# Patient Record
Sex: MALE | Race: WHITE | NOT HISPANIC OR LATINO | Employment: OTHER | URBAN - METROPOLITAN AREA
[De-identification: names, ages, dates, MRNs, and addresses within clinical notes are randomized per-mention and may not be internally consistent; named-entity substitution may affect disease eponyms.]

---

## 2019-03-18 ENCOUNTER — APPOINTMENT (OUTPATIENT)
Dept: LAB | Age: 84
End: 2019-03-18
Payer: MEDICARE

## 2019-03-18 ENCOUNTER — TRANSCRIBE ORDERS (OUTPATIENT)
Dept: ADMINISTRATIVE | Age: 84
End: 2019-03-18

## 2019-03-18 DIAGNOSIS — K92.1 BLOOD IN STOOL: ICD-10-CM

## 2019-03-18 DIAGNOSIS — K92.1 BLOOD IN STOOL: Primary | ICD-10-CM

## 2019-03-18 LAB
ERYTHROCYTE [DISTWIDTH] IN BLOOD BY AUTOMATED COUNT: 13.2 % (ref 11.6–15.1)
HCT VFR BLD AUTO: 41.6 % (ref 36.5–49.3)
HGB BLD-MCNC: 13.9 G/DL (ref 12–17)
MCH RBC QN AUTO: 30 PG (ref 26.8–34.3)
MCHC RBC AUTO-ENTMCNC: 33.4 G/DL (ref 31.4–37.4)
MCV RBC AUTO: 90 FL (ref 82–98)
PLATELET # BLD AUTO: 215 THOUSANDS/UL (ref 149–390)
PMV BLD AUTO: 12 FL (ref 8.9–12.7)
RBC # BLD AUTO: 4.64 MILLION/UL (ref 3.88–5.62)
WBC # BLD AUTO: 9.1 THOUSAND/UL (ref 4.31–10.16)

## 2019-03-18 PROCEDURE — 85027 COMPLETE CBC AUTOMATED: CPT

## 2019-03-18 PROCEDURE — 36415 COLL VENOUS BLD VENIPUNCTURE: CPT

## 2019-03-20 PROBLEM — K62.5 RECTAL BLEEDING: Status: ACTIVE | Noted: 2019-03-20

## 2019-03-20 PROBLEM — Z12.11 ENCOUNTER FOR SCREENING FECAL OCCULT BLOOD TESTING: Status: ACTIVE | Noted: 2019-03-20

## 2019-03-28 PROCEDURE — 88305 TISSUE EXAM BY PATHOLOGIST: CPT | Performed by: PATHOLOGY

## 2019-03-29 ENCOUNTER — LAB REQUISITION (OUTPATIENT)
Dept: LAB | Facility: HOSPITAL | Age: 84
End: 2019-03-29
Payer: MEDICARE

## 2019-03-29 DIAGNOSIS — D12.3 BENIGN NEOPLASM OF TRANSVERSE COLON: ICD-10-CM

## 2019-03-29 DIAGNOSIS — K57.30 DIVERTICULOSIS OF LARGE INTESTINE WITHOUT PERFORATION OR ABSCESS WITHOUT BLEEDING: ICD-10-CM

## 2020-09-16 ENCOUNTER — CLINICAL SUPPORT (OUTPATIENT)
Dept: INTERNAL MEDICINE CLINIC | Facility: CLINIC | Age: 85
End: 2020-09-16
Payer: MEDICARE

## 2020-09-16 DIAGNOSIS — Z23 NEED FOR INFLUENZA VACCINATION: Primary | ICD-10-CM

## 2020-09-16 PROCEDURE — 90653 IIV ADJUVANT VACCINE IM: CPT

## 2020-09-16 PROCEDURE — G0008 ADMIN INFLUENZA VIRUS VAC: HCPCS

## 2020-10-05 DIAGNOSIS — I10 ESSENTIAL HYPERTENSION: Primary | ICD-10-CM

## 2020-10-05 RX ORDER — AMLODIPINE BESYLATE 10 MG/1
10 TABLET ORAL DAILY
Qty: 90 TABLET | Refills: 0 | Status: SHIPPED | OUTPATIENT
Start: 2020-10-05 | End: 2020-12-21

## 2020-10-08 ENCOUNTER — TELEPHONE (OUTPATIENT)
Dept: INTERNAL MEDICINE CLINIC | Facility: CLINIC | Age: 85
End: 2020-10-08

## 2020-10-08 DIAGNOSIS — I10 ESSENTIAL HYPERTENSION: ICD-10-CM

## 2020-10-08 DIAGNOSIS — E78.5 HYPERLIPIDEMIA, UNSPECIFIED HYPERLIPIDEMIA TYPE: Primary | ICD-10-CM

## 2020-10-14 ENCOUNTER — OFFICE VISIT (OUTPATIENT)
Dept: INTERNAL MEDICINE CLINIC | Facility: CLINIC | Age: 85
End: 2020-10-14
Payer: MEDICARE

## 2020-10-14 VITALS
DIASTOLIC BLOOD PRESSURE: 84 MMHG | BODY MASS INDEX: 27.14 KG/M2 | SYSTOLIC BLOOD PRESSURE: 157 MMHG | HEART RATE: 89 BPM | TEMPERATURE: 98 F | WEIGHT: 159 LBS | HEIGHT: 64 IN

## 2020-10-14 DIAGNOSIS — I10 ESSENTIAL HYPERTENSION: Primary | ICD-10-CM

## 2020-10-14 PROCEDURE — 99214 OFFICE O/P EST MOD 30 MIN: CPT | Performed by: INTERNAL MEDICINE

## 2020-12-21 DIAGNOSIS — I10 ESSENTIAL HYPERTENSION: ICD-10-CM

## 2020-12-21 RX ORDER — AMLODIPINE BESYLATE 10 MG/1
TABLET ORAL
Qty: 90 TABLET | Refills: 0 | Status: SHIPPED | OUTPATIENT
Start: 2020-12-21 | End: 2021-03-31 | Stop reason: SDUPTHER

## 2021-01-20 DIAGNOSIS — Z23 ENCOUNTER FOR IMMUNIZATION: ICD-10-CM

## 2021-01-21 ENCOUNTER — IMMUNIZATIONS (OUTPATIENT)
Dept: FAMILY MEDICINE CLINIC | Facility: HOSPITAL | Age: 86
End: 2021-01-21

## 2021-01-21 DIAGNOSIS — Z23 ENCOUNTER FOR IMMUNIZATION: Primary | ICD-10-CM

## 2021-01-21 PROCEDURE — 0011A SARS-COV-2 / COVID-19 MRNA VACCINE (MODERNA) 100 MCG: CPT

## 2021-01-21 PROCEDURE — 91301 SARS-COV-2 / COVID-19 MRNA VACCINE (MODERNA) 100 MCG: CPT

## 2021-02-18 ENCOUNTER — IMMUNIZATIONS (OUTPATIENT)
Dept: FAMILY MEDICINE CLINIC | Facility: HOSPITAL | Age: 86
End: 2021-02-18

## 2021-02-18 DIAGNOSIS — Z23 ENCOUNTER FOR IMMUNIZATION: Primary | ICD-10-CM

## 2021-02-18 PROCEDURE — 91301 SARS-COV-2 / COVID-19 MRNA VACCINE (MODERNA) 100 MCG: CPT

## 2021-02-18 PROCEDURE — 0012A SARS-COV-2 / COVID-19 MRNA VACCINE (MODERNA) 100 MCG: CPT

## 2021-03-01 ENCOUNTER — TELEPHONE (OUTPATIENT)
Dept: INTERNAL MEDICINE CLINIC | Facility: CLINIC | Age: 86
End: 2021-03-01

## 2021-03-01 DIAGNOSIS — E78.5 HYPERLIPIDEMIA, UNSPECIFIED HYPERLIPIDEMIA TYPE: Primary | ICD-10-CM

## 2021-03-01 DIAGNOSIS — I10 ESSENTIAL HYPERTENSION: ICD-10-CM

## 2021-03-01 NOTE — TELEPHONE ENCOUNTER
Patient has an appt with you on 4/5/2021 and wants to know if he should get blood work done before the appointment?

## 2021-03-01 NOTE — TELEPHONE ENCOUNTER
Called and left a detailed message for patient that blood work script is in computer if he is going to [facility] and if he is going to another location we can fax it over

## 2021-03-31 DIAGNOSIS — I10 ESSENTIAL HYPERTENSION: ICD-10-CM

## 2021-04-01 RX ORDER — AMLODIPINE BESYLATE 10 MG/1
10 TABLET ORAL DAILY
Qty: 90 TABLET | Refills: 0 | Status: SHIPPED | OUTPATIENT
Start: 2021-04-01 | End: 2021-06-30 | Stop reason: SDUPTHER

## 2021-04-05 ENCOUNTER — OFFICE VISIT (OUTPATIENT)
Dept: INTERNAL MEDICINE CLINIC | Facility: CLINIC | Age: 86
End: 2021-04-05
Payer: MEDICARE

## 2021-04-05 VITALS
BODY MASS INDEX: 27.66 KG/M2 | OXYGEN SATURATION: 97 % | HEIGHT: 64 IN | DIASTOLIC BLOOD PRESSURE: 80 MMHG | WEIGHT: 162 LBS | HEART RATE: 86 BPM | SYSTOLIC BLOOD PRESSURE: 135 MMHG | TEMPERATURE: 98.3 F

## 2021-04-05 DIAGNOSIS — K62.5 RECTAL BLEEDING: ICD-10-CM

## 2021-04-05 DIAGNOSIS — Z00.00 MEDICARE ANNUAL WELLNESS VISIT, SUBSEQUENT: Primary | ICD-10-CM

## 2021-04-05 DIAGNOSIS — I10 ESSENTIAL HYPERTENSION: ICD-10-CM

## 2021-04-05 DIAGNOSIS — N18.30 CKD (CHRONIC KIDNEY DISEASE) STAGE 3, GFR 30-59 ML/MIN (HCC): ICD-10-CM

## 2021-04-05 DIAGNOSIS — Z12.11 ENCOUNTER FOR SCREENING FECAL OCCULT BLOOD TESTING: ICD-10-CM

## 2021-04-05 PROCEDURE — G0438 PPPS, INITIAL VISIT: HCPCS | Performed by: INTERNAL MEDICINE

## 2021-04-05 PROCEDURE — 99214 OFFICE O/P EST MOD 30 MIN: CPT | Performed by: INTERNAL MEDICINE

## 2021-04-05 PROCEDURE — 1123F ACP DISCUSS/DSCN MKR DOCD: CPT | Performed by: INTERNAL MEDICINE

## 2021-04-05 NOTE — PROGRESS NOTES
Assessment/Plan:    No problem-specific Assessment & Plan notes found for this encounter  Diagnoses and all orders for this visit:    Medicare annual wellness visit, subsequent          Subjective:      Patient ID: Vitaliy Huffman is a 81 y o  male  Follow up  Hypertension      The following portions of the patient's history were reviewed and updated as appropriate: allergies, current medications, past family history, past medical history, past social history, past surgical history, and problem list     Review of Systems   Constitutional: Negative  HENT: Negative for dental problem, drooling, ear discharge and ear pain  Eyes: Negative for discharge, redness and itching  Respiratory: Negative for apnea, cough and wheezing  Cardiovascular: Negative for chest pain and palpitations  Gastrointestinal: Negative for abdominal pain, blood in stool, diarrhea and vomiting  Endocrine: Negative for polydipsia, polyphagia and polyuria  Genitourinary: Negative for decreased urine volume, dysuria and frequency  Musculoskeletal: Negative for arthralgias, myalgias and neck stiffness  Skin: Negative for pallor and wound  Allergic/Immunologic: Negative for environmental allergies and food allergies  Neurological: Negative for facial asymmetry, light-headedness, numbness and headaches  Hematological: Negative for adenopathy  Does not bruise/bleed easily  Psychiatric/Behavioral: Negative for agitation, behavioral problems and confusion  BMI Counseling: Body mass index is 27 81 kg/m²  The BMI is above normal  Nutrition recommendations include reducing portion sizes  Exercise recommendations include exercising 3-5 times per week  Pharmacotherapy was ordered for patient to aid in weight loss  2  Objective:      /80 (BP Location: Right arm, Patient Position: Sitting, Cuff Size: Standard)   Pulse 86   Temp 98 3 °F (36 8 °C) (Temporal)   Ht 5' 4" (1 626 m)   Wt 73 5 kg (162 lb)   SpO2 97%   BMI 27 81 kg/m²          Physical Exam  Constitutional:       Appearance: Normal appearance  HENT:      Head: Normocephalic  Nose: Nose normal       Mouth/Throat:      Mouth: Mucous membranes are moist    Eyes:      Pupils: Pupils are equal, round, and reactive to light  Neck:      Musculoskeletal: Neck supple  Cardiovascular:      Rate and Rhythm: Regular rhythm  Heart sounds: Normal heart sounds  Pulmonary:      Breath sounds: Normal breath sounds  Abdominal:      Palpations: Abdomen is soft  Musculoskeletal:         General: No swelling  Skin:     General: Skin is warm  Neurological:      General: No focal deficit present  Mental Status: He is alert and oriented to person, place, and time  Psychiatric:         Mood and Affect: Mood normal         Assessment and Plan:  Hypertension  Well controlled  With  Current meds  CKD  Stage  3  stabe  Problem List Items Addressed This Visit     None           Preventive health issues were discussed with patient, and age appropriate screening tests were ordered as noted in patient's After Visit Summary  Personalized health advice and appropriate referrals for health education or preventive services given if needed, as noted in patient's After Visit Summary       History of Present Illness:     Patient presents for Medicare Annual Wellness visit    Patient Care Team:  Vinicio Flores MD as PCP - General (Internal Medicine)  Cayden Kelley MD (Colon and Rectal Surgery)     Problem List:     Patient Active Problem List   Diagnosis    Rectal bleeding    Encounter for screening fecal occult blood testing      Past Medical and Surgical History:     Past Medical History:   Diagnosis Date    Basal cell carcinoma     Head    Hyperlipemia     Hypertension     Positive fecal occult blood test     Psoriasis     Rectal bleeding      Past Surgical History:   Procedure Laterality Date    BASAL CELL CARCINOMA EXCISION      COLONOSCOPY  COLONOSCOPY  03/28/2019    HERNIA REPAIR  2012    Inguinal    LIPOMA RESECTION      Scalp    SIGMOIDOSCOPY        Family History:     Family History   Problem Relation Age of Onset    Hypertension Father     Kidney disease Father       Social History:        Social History     Socioeconomic History    Marital status: Unknown     Spouse name: None    Number of children: None    Years of education: None    Highest education level: None   Occupational History    None   Social Needs    Financial resource strain: None    Food insecurity     Worry: None     Inability: None    Transportation needs     Medical: None     Non-medical: None   Tobacco Use    Smoking status: Never Smoker    Smokeless tobacco: Never Used   Substance and Sexual Activity    Alcohol use: None     Comment: Socially - as per [employer]    Drug use: None    Sexual activity: None   Lifestyle    Physical activity     Days per week: None     Minutes per session: None    Stress: None   Relationships    Social connections     Talks on phone: None     Gets together: None     Attends Gnosticism service: None     Active member of club or organization: None     Attends meetings of clubs or organizations: None     Relationship status: None    Intimate partner violence     Fear of current or ex partner: None     Emotionally abused: None     Physically abused: None     Forced sexual activity: None   Other Topics Concern    None   Social History Narrative    Marital Status:      As per [facility]      Medications and Allergies:     Current Outpatient Medications   Medication Sig Dispense Refill    amLODIPine (NORVASC) 10 mg tablet Take 1 tablet (10 mg total) by mouth daily 90 tablet 0     No current facility-administered medications for this visit        No Known Allergies   Immunizations:     Immunization History   Administered Date(s) Administered    Hep A, ped/adol, 2 dose 11/05/2015, 02/22/2017    Hepatitis A 11/05/2015, 02/22/2017    INFLUENZA 10/22/2007, 10/27/2008, 09/20/2012, 09/20/2013, 10/13/2014, 10/14/2015, 11/11/2016, 10/09/2017, 10/25/2018, 10/28/2019, 11/12/2019, 09/16/2020    Pneumococcal 10/22/2007    Pneumococcal Conjugate 13-Valent 04/02/2015    Pneumococcal Conjugate PCV 7 10/22/2007    Pneumococcal Polysaccharide PPV23 06/04/2020    SARS-CoV-2 / COVID-19 mRNA IM (Helder Uribe) 01/21/2021, 02/18/2021    Td (adult), Unspecified 09/16/1997, 06/03/2008    Td (adult), adsorbed 09/16/1997, 06/03/2008    Zoster 06/04/2008    influenza, trivalent, adjuvanted 09/16/2020      Health Maintenance:         Topic Date Due    Colonoscopy Surveillance  03/28/2024         Topic Date Due    DTaP,Tdap,and Td Vaccines (1 - Tdap) 12/23/1956      Medicare Health Risk Assessment:     /80 (BP Location: Right arm, Patient Position: Sitting, Cuff Size: Standard)   Pulse 86   Temp 98 3 °F (36 8 °C) (Temporal)   Ht 5' 4" (1 626 m)   Wt 73 5 kg (162 lb)   SpO2 97%   BMI 27 81 kg/m²      Oanh Cardozo is here for his Subsequent Wellness visit  Health Risk Assessment:   Patient rates overall health as good  Patient feels that their physical health rating is same  Patient is very satisfied with their life  Eyesight was rated as same  Hearing was rated as same  Patient feels that their emotional and mental health rating is same  Patients states they are never, rarely angry  Patient states they are never, rarely unusually tired/fatigued  Pain experienced in the last 7 days has been none  Patient states that he has experienced no weight loss or gain in last 6 months  Depression Screening:   PHQ-2 Score: 0      Fall Risk Screening: In the past year, patient has experienced: no history of falling in past year      Home Safety:  Patient does not have trouble with stairs inside or outside of their home  Patient has working smoke alarms and has working carbon monoxide detector  Home safety hazards include: none  Nutrition:   Current diet is Regular  Medications:   Patient is currently taking over-the-counter supplements  OTC medications include: see medication list  Patient is able to manage medications  Activities of Daily Living (ADLs)/Instrumental Activities of Daily Living (IADLs):   Walk and transfer into and out of bed and chair?: Yes  Dress and groom yourself?: Yes    Bathe or shower yourself?: Yes    Feed yourself? Yes  Do your laundry/housekeeping?: Yes  Manage your money, pay your bills and track your expenses?: Yes  Make your own meals?: Yes    Do your own shopping?: Yes    Previous Hospitalizations:   Any hospitalizations or ED visits within the last 12 months?: No      Advance Care Planning:   Living will: Yes    Durable POA for healthcare: Yes    Advanced directive: Yes      PREVENTIVE SCREENINGS      Cardiovascular Screening:    General: Screening Not Indicated and History Lipid Disorder      Prostate Cancer Screening:    General: Screening Not Indicated      Lung Cancer Screening:     General: Screening Not Indicated    Screening, Brief Intervention, and Referral to Treatment (SBIRT)    Screening  Typical number of drinks in a day: 0  Typical number of drinks in a week: 0  Interpretation: Low risk drinking behavior      Single Item Drug Screening:  How often have you used an illegal drug (including marijuana) or a prescription medication for non-medical reasons in the past year? never    Single Item Drug Screen Score: 0  Interpretation: Negative screen for possible drug use disorder      Syd Mann MD

## 2021-04-05 NOTE — PATIENT INSTRUCTIONS

## 2021-06-30 DIAGNOSIS — I10 ESSENTIAL HYPERTENSION: ICD-10-CM

## 2021-06-30 RX ORDER — AMLODIPINE BESYLATE 10 MG/1
10 TABLET ORAL DAILY
Qty: 90 TABLET | Refills: 0 | Status: SHIPPED | OUTPATIENT
Start: 2021-06-30 | End: 2021-09-27 | Stop reason: SDUPTHER

## 2021-07-29 ENCOUNTER — TELEPHONE (OUTPATIENT)
Dept: INTERNAL MEDICINE CLINIC | Facility: CLINIC | Age: 86
End: 2021-07-29

## 2021-07-29 NOTE — TELEPHONE ENCOUNTER
Does he need to get any blood work done before his visit next Friday, he will  script if he does need it

## 2021-08-06 ENCOUNTER — OFFICE VISIT (OUTPATIENT)
Dept: INTERNAL MEDICINE CLINIC | Facility: CLINIC | Age: 86
End: 2021-08-06
Payer: MEDICARE

## 2021-08-06 VITALS
BODY MASS INDEX: 28 KG/M2 | SYSTOLIC BLOOD PRESSURE: 138 MMHG | WEIGHT: 164 LBS | OXYGEN SATURATION: 97 % | DIASTOLIC BLOOD PRESSURE: 85 MMHG | HEIGHT: 64 IN | HEART RATE: 89 BPM | TEMPERATURE: 97.5 F

## 2021-08-06 DIAGNOSIS — R01.1 HEART MURMUR: Primary | ICD-10-CM

## 2021-08-06 DIAGNOSIS — I15.9 SECONDARY HYPERTENSION: ICD-10-CM

## 2021-08-06 PROCEDURE — 99214 OFFICE O/P EST MOD 30 MIN: CPT | Performed by: INTERNAL MEDICINE

## 2021-08-06 NOTE — PROGRESS NOTES
Assessment/Plan:    Follow  Up  Visit  For  Hypertension,  Mild  hyperlipidemia     Diagnoses and all orders for this visit:    Secondary hypertension          Subjective:      Patient ID: Daniele Mon is a 81 y o  male  HPI    The following portions of the patient's history were reviewed and updated as appropriate: allergies, current medications, past family history, past medical history, past social history, past surgical history, and problem list     Review of Systems   Constitutional: Negative  HENT: Negative for dental problem, drooling, ear discharge and ear pain  Eyes: Negative for discharge, redness and itching  Respiratory: Negative for apnea, cough and wheezing  Cardiovascular: Negative for chest pain and palpitations  Gastrointestinal: Negative for abdominal pain, blood in stool, diarrhea and vomiting  Endocrine: Negative for polydipsia, polyphagia and polyuria  Genitourinary: Negative for decreased urine volume, dysuria and frequency  Musculoskeletal: Negative for arthralgias, myalgias and neck stiffness  Skin: Negative for pallor and wound  Allergic/Immunologic: Negative for environmental allergies and food allergies  Neurological: Negative for facial asymmetry, light-headedness, numbness and headaches  Hematological: Negative for adenopathy  Does not bruise/bleed easily  Psychiatric/Behavioral: Negative for agitation, behavioral problems and confusion  Objective:      /85 (BP Location: Left arm, Patient Position: Sitting, Cuff Size: Standard)   Pulse 89   Temp 97 5 °F (36 4 °C) (Temporal)   Ht 5' 4" (1 626 m)   Wt 74 4 kg (164 lb)   SpO2 97%   BMI 28 15 kg/m²          Physical Exam  Constitutional:       Appearance: Normal appearance  HENT:      Head: Normocephalic  Nose: Nose normal       Mouth/Throat:      Mouth: Mucous membranes are moist    Eyes:      Pupils: Pupils are equal, round, and reactive to light     Cardiovascular:      Rate and Rhythm: Regular rhythm  Heart sounds: Normal heart sounds  Pulmonary:      Breath sounds: Normal breath sounds  Abdominal:      Palpations: Abdomen is soft  Musculoskeletal:         General: No swelling  Cervical back: Neck supple  Skin:     General: Skin is warm  Neurological:      General: No focal deficit present  Mental Status: He is alert and oriented to person, place, and time  Psychiatric:         Mood and Affect: Mood normal        Hypertension   Well  Controlled with Amlodipine     Hyperlipidemia  Controlled  With  Diet  Heart  Murmur    Echo  Ordered  To  See  The source of the murmur and  Need  For  Follow up       Fup  6 phyllis Meadows MD

## 2021-09-03 ENCOUNTER — HOSPITAL ENCOUNTER (OUTPATIENT)
Dept: NON INVASIVE DIAGNOSTICS | Facility: CLINIC | Age: 86
Discharge: HOME | End: 2021-09-03
Payer: MEDICARE

## 2021-09-03 DIAGNOSIS — R01.1 HEART MURMUR: ICD-10-CM

## 2021-09-03 PROCEDURE — 93306 TTE W/DOPPLER COMPLETE: CPT

## 2021-09-03 PROCEDURE — 93306 TTE W/DOPPLER COMPLETE: CPT | Performed by: INTERNAL MEDICINE

## 2021-09-14 ENCOUNTER — CLINICAL SUPPORT (OUTPATIENT)
Dept: INTERNAL MEDICINE CLINIC | Facility: CLINIC | Age: 86
End: 2021-09-14
Payer: MEDICARE

## 2021-09-14 DIAGNOSIS — Z23 FLU VACCINE NEED: Primary | ICD-10-CM

## 2021-09-14 PROCEDURE — 90662 IIV NO PRSV INCREASED AG IM: CPT

## 2021-09-14 PROCEDURE — G0008 ADMIN INFLUENZA VIRUS VAC: HCPCS

## 2021-09-27 DIAGNOSIS — I10 ESSENTIAL HYPERTENSION: ICD-10-CM

## 2021-09-27 RX ORDER — AMLODIPINE BESYLATE 10 MG/1
10 TABLET ORAL DAILY
Qty: 90 TABLET | Refills: 1 | Status: SHIPPED | OUTPATIENT
Start: 2021-09-27 | End: 2022-03-15 | Stop reason: SDUPTHER

## 2022-02-07 ENCOUNTER — OFFICE VISIT (OUTPATIENT)
Dept: INTERNAL MEDICINE CLINIC | Facility: CLINIC | Age: 87
End: 2022-02-07
Payer: MEDICARE

## 2022-02-07 VITALS
HEART RATE: 87 BPM | TEMPERATURE: 98.9 F | WEIGHT: 160 LBS | OXYGEN SATURATION: 98 % | BODY MASS INDEX: 27.31 KG/M2 | SYSTOLIC BLOOD PRESSURE: 130 MMHG | DIASTOLIC BLOOD PRESSURE: 80 MMHG | HEIGHT: 64 IN

## 2022-02-07 DIAGNOSIS — I10 HYPERTENSION: ICD-10-CM

## 2022-02-07 DIAGNOSIS — E78.5 HYPERLIPIDEMIA: ICD-10-CM

## 2022-02-07 DIAGNOSIS — D64.9 ANEMIA: Primary | ICD-10-CM

## 2022-02-07 DIAGNOSIS — N18.30 CKD (CHRONIC KIDNEY DISEASE) STAGE 3, GFR 30-59 ML/MIN (HCC): ICD-10-CM

## 2022-02-07 PROCEDURE — 99214 OFFICE O/P EST MOD 30 MIN: CPT | Performed by: INTERNAL MEDICINE

## 2022-02-07 NOTE — PROGRESS NOTES
Assessment/Plan:    Follow up  Hypertension     Diagnoses and all orders for this visit:    Hypertension    Hyperlipidemia          Subjective: No  Complaints  Patient ID: Camilo Lopez is a 81 y o  male  HPI    The following portions of the patient's history were reviewed and updated as appropriate: allergies, current medications, past family history, past medical history, past social history, past surgical history, and problem list     Review of Systems   Constitutional: Negative  HENT: Negative for dental problem, drooling, ear discharge and ear pain  Eyes: Negative for discharge, redness and itching  Respiratory: Negative for apnea, cough and wheezing  Cardiovascular: Negative for chest pain and palpitations  Gastrointestinal: Negative for abdominal pain, blood in stool, diarrhea and vomiting  Endocrine: Negative for polydipsia, polyphagia and polyuria  Genitourinary: Negative for decreased urine volume, dysuria and frequency  Musculoskeletal: Negative for arthralgias, myalgias and neck stiffness  Skin: Negative for pallor and wound  Allergic/Immunologic: Negative for environmental allergies and food allergies  Neurological: Negative for facial asymmetry, light-headedness, numbness and headaches  Hematological: Negative for adenopathy  Does not bruise/bleed easily  Psychiatric/Behavioral: Negative for agitation, behavioral problems and confusion  Objective: There were no vitals taken for this visit  Physical Exam  Constitutional:       Appearance: Normal appearance  HENT:      Head: Normocephalic  Nose: Nose normal       Mouth/Throat:      Mouth: Mucous membranes are moist    Eyes:      Pupils: Pupils are equal, round, and reactive to light  Cardiovascular:      Rate and Rhythm: Regular rhythm  Heart sounds: Murmur heard  Pulmonary:      Breath sounds: Normal breath sounds  Abdominal:      Palpations: Abdomen is soft  Musculoskeletal:         General: No swelling  Cervical back: Neck supple  Skin:     General: Skin is warm  Neurological:      General: No focal deficit present  Mental Status: He is alert and oriented to person, place, and time  Psychiatric:         Mood and Affect: Mood normal        Problem #1  Hypertension     Well controlled with  Amlodipine  5 mg  Daily     Blood  Work  Ordered        Fup  6 months

## 2022-02-08 ENCOUNTER — APPOINTMENT (OUTPATIENT)
Dept: LAB | Facility: CLINIC | Age: 87
End: 2022-02-08
Payer: MEDICARE

## 2022-02-08 DIAGNOSIS — I10 HYPERTENSION: ICD-10-CM

## 2022-02-08 DIAGNOSIS — D64.9 ANEMIA: ICD-10-CM

## 2022-02-08 DIAGNOSIS — E78.5 HYPERLIPIDEMIA: ICD-10-CM

## 2022-02-08 LAB
ALBUMIN SERPL BCP-MCNC: 3.7 G/DL (ref 3.5–5)
ALP SERPL-CCNC: 88 U/L (ref 46–116)
ALT SERPL W P-5'-P-CCNC: 31 U/L (ref 12–78)
ANION GAP SERPL CALCULATED.3IONS-SCNC: 8 MMOL/L (ref 4–13)
AST SERPL W P-5'-P-CCNC: 16 U/L (ref 5–45)
BASOPHILS # BLD AUTO: 0.03 THOUSANDS/ΜL (ref 0–0.1)
BASOPHILS NFR BLD AUTO: 0 % (ref 0–1)
BILIRUB SERPL-MCNC: 1.2 MG/DL (ref 0.2–1)
BUN SERPL-MCNC: 24 MG/DL (ref 5–25)
CALCIUM SERPL-MCNC: 9 MG/DL (ref 8.3–10.1)
CHLORIDE SERPL-SCNC: 109 MMOL/L (ref 100–108)
CHOLEST SERPL-MCNC: 207 MG/DL
CO2 SERPL-SCNC: 21 MMOL/L (ref 21–32)
CREAT SERPL-MCNC: 1.3 MG/DL (ref 0.6–1.3)
EOSINOPHIL # BLD AUTO: 0.51 THOUSAND/ΜL (ref 0–0.61)
EOSINOPHIL NFR BLD AUTO: 5 % (ref 0–6)
ERYTHROCYTE [DISTWIDTH] IN BLOOD BY AUTOMATED COUNT: 14.2 % (ref 11.6–15.1)
GFR SERPL CREATININE-BSD FRML MDRD: 49 ML/MIN/1.73SQ M
GLUCOSE P FAST SERPL-MCNC: 91 MG/DL (ref 65–99)
HCT VFR BLD AUTO: 43.6 % (ref 36.5–49.3)
HDLC SERPL-MCNC: 49 MG/DL
HGB BLD-MCNC: 14 G/DL (ref 12–17)
IMM GRANULOCYTES # BLD AUTO: 0.03 THOUSAND/UL (ref 0–0.2)
IMM GRANULOCYTES NFR BLD AUTO: 0 % (ref 0–2)
LDLC SERPL CALC-MCNC: 130 MG/DL (ref 0–100)
LYMPHOCYTES # BLD AUTO: 2.08 THOUSANDS/ΜL (ref 0.6–4.47)
LYMPHOCYTES NFR BLD AUTO: 21 % (ref 14–44)
MCH RBC QN AUTO: 29 PG (ref 26.8–34.3)
MCHC RBC AUTO-ENTMCNC: 32.1 G/DL (ref 31.4–37.4)
MCV RBC AUTO: 90 FL (ref 82–98)
MONOCYTES # BLD AUTO: 0.84 THOUSAND/ΜL (ref 0.17–1.22)
MONOCYTES NFR BLD AUTO: 8 % (ref 4–12)
NEUTROPHILS # BLD AUTO: 6.53 THOUSANDS/ΜL (ref 1.85–7.62)
NEUTS SEG NFR BLD AUTO: 66 % (ref 43–75)
NONHDLC SERPL-MCNC: 158 MG/DL
NRBC BLD AUTO-RTO: 0 /100 WBCS
PLATELET # BLD AUTO: 231 THOUSANDS/UL (ref 149–390)
PMV BLD AUTO: 11.1 FL (ref 8.9–12.7)
POTASSIUM SERPL-SCNC: 4 MMOL/L (ref 3.5–5.3)
PROT SERPL-MCNC: 7.6 G/DL (ref 6.4–8.2)
RBC # BLD AUTO: 4.83 MILLION/UL (ref 3.88–5.62)
SODIUM SERPL-SCNC: 138 MMOL/L (ref 136–145)
TRIGL SERPL-MCNC: 138 MG/DL
WBC # BLD AUTO: 10.02 THOUSAND/UL (ref 4.31–10.16)

## 2022-02-08 PROCEDURE — 80061 LIPID PANEL: CPT

## 2022-02-08 PROCEDURE — 85025 COMPLETE CBC W/AUTO DIFF WBC: CPT

## 2022-02-08 PROCEDURE — 36415 COLL VENOUS BLD VENIPUNCTURE: CPT

## 2022-02-08 PROCEDURE — 80053 COMPREHEN METABOLIC PANEL: CPT

## 2022-03-15 DIAGNOSIS — I10 ESSENTIAL HYPERTENSION: ICD-10-CM

## 2022-03-15 RX ORDER — AMLODIPINE BESYLATE 10 MG/1
10 TABLET ORAL DAILY
Qty: 90 TABLET | Refills: 1 | Status: SHIPPED | OUTPATIENT
Start: 2022-03-15

## 2022-08-04 ENCOUNTER — RA CDI HCC (OUTPATIENT)
Dept: OTHER | Facility: HOSPITAL | Age: 87
End: 2022-08-04

## 2022-08-04 NOTE — PROGRESS NOTES
[address]  coding opportunities       Chart reviewed, no opportunity found: CHART REVIEWED, NO OPPORTUNITY FOUND        Patients Insurance     Medicare Insurance: Medicare

## 2022-08-15 ENCOUNTER — OFFICE VISIT (OUTPATIENT)
Dept: INTERNAL MEDICINE CLINIC | Facility: CLINIC | Age: 87
End: 2022-08-15
Payer: MEDICARE

## 2022-08-15 VITALS
OXYGEN SATURATION: 98 % | HEIGHT: 64 IN | SYSTOLIC BLOOD PRESSURE: 130 MMHG | WEIGHT: 157 LBS | TEMPERATURE: 98.7 F | HEART RATE: 88 BPM | DIASTOLIC BLOOD PRESSURE: 82 MMHG | BODY MASS INDEX: 26.8 KG/M2

## 2022-08-15 DIAGNOSIS — I10 HYPERTENSION: Primary | ICD-10-CM

## 2022-08-15 DIAGNOSIS — Z00.00 ENCOUNTER FOR MEDICARE ANNUAL WELLNESS EXAM: ICD-10-CM

## 2022-08-15 PROCEDURE — G0439 PPPS, SUBSEQ VISIT: HCPCS | Performed by: INTERNAL MEDICINE

## 2022-08-15 RX ORDER — MULTIVITAMIN
1 TABLET ORAL DAILY
COMMUNITY

## 2022-08-15 NOTE — PROGRESS NOTES
Assessment and Plan:     Problem List Items Addressed This Visit    None     Visit Diagnoses     Hypertension    -  Primary    Encounter for Medicare annual wellness exam               Preventive health issues were discussed with patient, and age appropriate screening tests were ordered as noted in patient's After Visit Summary  Personalized health advice and appropriate referrals for health education or preventive services given if needed, as noted in patient's After Visit Summary       History of Present Illness:     Patient presents for a Medicare Wellness Visit    HPI   Patient Care Team:  Aurora Louis MD as PCP - General (Internal Medicine)  Bailey Lao MD (Colon and Rectal Surgery)     Review of Systems:     Review of Systems     Problem List:     Patient Active Problem List   Diagnosis    Rectal bleeding    Encounter for screening fecal occult blood testing    CKD (chronic kidney disease) stage 3, GFR 30-59 ml/min ([facility])      Past Medical and Surgical History:     Past Medical History:   Diagnosis Date    Basal cell carcinoma     Head    Hyperlipemia     Hypertension     Positive fecal occult blood test     Psoriasis     Rectal bleeding      Past Surgical History:   Procedure Laterality Date    BASAL CELL CARCINOMA EXCISION      COLONOSCOPY      COLONOSCOPY  03/28/2019    HERNIA REPAIR  2012    Inguinal    LIPOMA RESECTION      Scalp    SIGMOIDOSCOPY        Family History:     Family History   Problem Relation Age of Onset    Hypertension Father     Kidney disease Father       Social History:     Social History     Socioeconomic History    Marital status: Unknown     Spouse name: None    Number of children: None    Years of education: None    Highest education level: None   Occupational History    None   Tobacco Use    Smoking status: Never Smoker    Smokeless tobacco: Never Used   Substance and Sexual Activity    Alcohol use: None     Comment: Socially - as per [employer]    Drug use: None    Sexual activity: None   Other Topics Concern    None   Social History Narrative    Marital Status:      As per [facility]     Social Determinants of Health     Financial Resource Strain: Not on file   Food Insecurity: Not on file   Transportation Needs: Not on file   Physical Activity: Not on file   Stress: Not on file   Social Connections: Not on file   Intimate Partner Violence: Not on file   Housing Stability: Not on file      Medications and Allergies:     Current Outpatient Medications   Medication Sig Dispense Refill    amLODIPine (NORVASC) 10 mg tablet Take 1 tablet (10 mg total) by mouth daily 90 tablet 1    Multiple Vitamin (multivitamin) tablet Take 1 tablet by mouth daily       No current facility-administered medications for this visit       No Known Allergies   Immunizations:     Immunization History   Administered Date(s) Administered    COVID-19 MODERNA VACC 0 5 ML IM 01/21/2021, 02/18/2021    Hep A, ped/adol, 2 dose 11/05/2015, 02/22/2017    Hepatitis A 11/05/2015, 02/22/2017    INFLUENZA 10/22/2007, 10/27/2008, 09/20/2012, 09/20/2013, 10/13/2014, 10/14/2015, 11/11/2016, 10/09/2017, 10/25/2018, 10/28/2019, 11/12/2019, 09/16/2020    Influenza, high dose seasonal 0 7 mL 09/14/2021    Pneumococcal 10/22/2007    Pneumococcal Conjugate 13-Valent 04/02/2015    Pneumococcal Conjugate PCV 7 10/22/2007    Pneumococcal Polysaccharide PPV23 06/04/2020    Td (adult), Unspecified 09/16/1997, 06/03/2008    Td (adult), adsorbed 09/16/1997, 06/03/2008    Zoster 06/04/2008    influenza, trivalent, adjuvanted 09/16/2020      Health Maintenance:         Topic Date Due    Colorectal Cancer Screening  03/28/2024         Topic Date Due    COVID-19 Vaccine (3 - Booster for Moderna series) 07/18/2021    Influenza Vaccine (1) 09/01/2022      Medicare Screening Tests and Risk Assessments:         Health Risk Assessment:   Patient rates overall health as very good  Patient feels that their physical health rating is same  Patient is very satisfied with their life  Eyesight was rated as same  Hearing was rated as same  Patient feels that their emotional and mental health rating is same  Patients states they are sometimes angry  Patient states they are never, rarely unusually tired/fatigued  Patient states that he has experienced no weight loss or gain in last 6 months  Fall Risk Screening: In the past year, patient has experienced: no history of falling in past year      Home Safety:  Patient does not have trouble with stairs inside or outside of their home  Patient has no working smoke alarms Home safety hazards include: none  Nutrition:   Current diet is Regular  Medications:   Patient is not currently taking any over-the-counter supplements  Patient is able to manage medications  Activities of Daily Living (ADLs)/Instrumental Activities of Daily Living (IADLs):   Walk and transfer into and out of bed and chair?: Yes  Dress and groom yourself?: Yes    Bathe or shower yourself?: Yes    Feed yourself? Yes  Do your laundry/housekeeping?: Yes  Manage your money, pay your bills and track your expenses?: Yes  Make your own meals?: Yes    Do your own shopping?: Yes    Previous Hospitalizations:   Any hospitalizations or ED visits within the last 12 months?: No      Advance Care Planning:   Living will: Yes    Durable POA for healthcare:  Yes    Advanced directive: Yes      PREVENTIVE SCREENINGS      Cardiovascular Screening:    General: Screening Not Indicated and History Lipid Disorder      Diabetes Screening:     General: Screening Current      Colorectal Cancer Screening:     General: Screening Not Indicated      Prostate Cancer Screening:    General: Screening Not Indicated      Lung Cancer Screening:     General: Screening Not Indicated    Screening, Brief Intervention, and Referral to Treatment (SBIRT)    Screening  Typical number of drinks in a day: 1  Typical number of drinks in a week: 7  Interpretation: Low risk drinking behavior      Single Item Drug Screening:  How often have you used an illegal drug (including marijuana) or a prescription medication for non-medical reasons in the past year? never    Single Item Drug Screen Score: 0  Interpretation: Negative screen for possible drug use disorder    No exam data present     Physical Exam:     /82 (BP Location: Right arm, Patient Position: Sitting, Cuff Size: Standard)   Pulse 88   Temp 98 7 °F (37 1 °C) (Temporal)   Ht 5' 4" (1 626 m)   Wt 71 2 kg (157 lb)   SpO2 98%   BMI 26 95 kg/m²     Physical Exam     Magnolia Mondragon MD

## 2022-08-16 ENCOUNTER — APPOINTMENT (OUTPATIENT)
Dept: LAB | Facility: CLINIC | Age: 87
End: 2022-08-16
Payer: MEDICARE

## 2022-08-16 DIAGNOSIS — I10 HYPERTENSION: ICD-10-CM

## 2022-08-16 LAB
ALBUMIN SERPL BCP-MCNC: 3.6 G/DL (ref 3.5–5)
ALP SERPL-CCNC: 88 U/L (ref 46–116)
ALT SERPL W P-5'-P-CCNC: 33 U/L (ref 12–78)
ANION GAP SERPL CALCULATED.3IONS-SCNC: 7 MMOL/L (ref 4–13)
AST SERPL W P-5'-P-CCNC: 22 U/L (ref 5–45)
BASOPHILS # BLD AUTO: 0.04 THOUSANDS/ΜL (ref 0–0.1)
BASOPHILS NFR BLD AUTO: 1 % (ref 0–1)
BILIRUB SERPL-MCNC: 0.62 MG/DL (ref 0.2–1)
BUN SERPL-MCNC: 17 MG/DL (ref 5–25)
CALCIUM SERPL-MCNC: 9 MG/DL (ref 8.3–10.1)
CHLORIDE SERPL-SCNC: 109 MMOL/L (ref 96–108)
CHOLEST SERPL-MCNC: 208 MG/DL
CO2 SERPL-SCNC: 22 MMOL/L (ref 21–32)
CREAT SERPL-MCNC: 1.46 MG/DL (ref 0.6–1.3)
EOSINOPHIL # BLD AUTO: 0.51 THOUSAND/ΜL (ref 0–0.61)
EOSINOPHIL NFR BLD AUTO: 6 % (ref 0–6)
ERYTHROCYTE [DISTWIDTH] IN BLOOD BY AUTOMATED COUNT: 13.6 % (ref 11.6–15.1)
GFR SERPL CREATININE-BSD FRML MDRD: 43 ML/MIN/1.73SQ M
GLUCOSE P FAST SERPL-MCNC: 108 MG/DL (ref 65–99)
HCT VFR BLD AUTO: 47.2 % (ref 36.5–49.3)
HDLC SERPL-MCNC: 54 MG/DL
HGB BLD-MCNC: 14.9 G/DL (ref 12–17)
IMM GRANULOCYTES # BLD AUTO: 0.03 THOUSAND/UL (ref 0–0.2)
IMM GRANULOCYTES NFR BLD AUTO: 0 % (ref 0–2)
LDLC SERPL CALC-MCNC: 127 MG/DL (ref 0–100)
LYMPHOCYTES # BLD AUTO: 2.27 THOUSANDS/ΜL (ref 0.6–4.47)
LYMPHOCYTES NFR BLD AUTO: 28 % (ref 14–44)
MCH RBC QN AUTO: 28.4 PG (ref 26.8–34.3)
MCHC RBC AUTO-ENTMCNC: 31.6 G/DL (ref 31.4–37.4)
MCV RBC AUTO: 90 FL (ref 82–98)
MONOCYTES # BLD AUTO: 0.75 THOUSAND/ΜL (ref 0.17–1.22)
MONOCYTES NFR BLD AUTO: 9 % (ref 4–12)
NEUTROPHILS # BLD AUTO: 4.6 THOUSANDS/ΜL (ref 1.85–7.62)
NEUTS SEG NFR BLD AUTO: 56 % (ref 43–75)
NONHDLC SERPL-MCNC: 154 MG/DL
NRBC BLD AUTO-RTO: 0 /100 WBCS
PLATELET # BLD AUTO: 253 THOUSANDS/UL (ref 149–390)
PMV BLD AUTO: 11.6 FL (ref 8.9–12.7)
POTASSIUM SERPL-SCNC: 3.9 MMOL/L (ref 3.5–5.3)
PROT SERPL-MCNC: 7.7 G/DL (ref 6.4–8.4)
RBC # BLD AUTO: 5.25 MILLION/UL (ref 3.88–5.62)
SODIUM SERPL-SCNC: 138 MMOL/L (ref 135–147)
TRIGL SERPL-MCNC: 136 MG/DL
WBC # BLD AUTO: 8.2 THOUSAND/UL (ref 4.31–10.16)

## 2022-08-16 PROCEDURE — 36415 COLL VENOUS BLD VENIPUNCTURE: CPT

## 2022-08-16 PROCEDURE — 80061 LIPID PANEL: CPT

## 2022-08-16 PROCEDURE — 80053 COMPREHEN METABOLIC PANEL: CPT

## 2022-08-16 PROCEDURE — 85025 COMPLETE CBC W/AUTO DIFF WBC: CPT

## 2022-09-20 DIAGNOSIS — I10 ESSENTIAL HYPERTENSION: ICD-10-CM

## 2022-09-20 RX ORDER — AMLODIPINE BESYLATE 10 MG/1
10 TABLET ORAL DAILY
Qty: 90 TABLET | Refills: 1 | Status: SHIPPED | OUTPATIENT
Start: 2022-09-20

## 2023-02-06 ENCOUNTER — TELEPHONE (OUTPATIENT)
Dept: INTERNAL MEDICINE CLINIC | Facility: CLINIC | Age: 88
End: 2023-02-06

## 2023-02-06 DIAGNOSIS — E78.5 HYPERLIPIDEMIA: Primary | ICD-10-CM

## 2023-02-06 NOTE — TELEPHONE ENCOUNTER
Patient has an appointment next week and will did labs ordered for his appointment  Call patient back when labs or ordered

## 2023-02-07 ENCOUNTER — APPOINTMENT (OUTPATIENT)
Dept: LAB | Facility: CLINIC | Age: 88
End: 2023-02-07

## 2023-02-07 DIAGNOSIS — E78.5 HYPERLIPIDEMIA: ICD-10-CM

## 2023-02-07 LAB
ALBUMIN SERPL BCP-MCNC: 3.6 G/DL (ref 3.5–5)
ALP SERPL-CCNC: 87 U/L (ref 46–116)
ALT SERPL W P-5'-P-CCNC: 26 U/L (ref 12–78)
ANION GAP SERPL CALCULATED.3IONS-SCNC: 4 MMOL/L (ref 4–13)
AST SERPL W P-5'-P-CCNC: 16 U/L (ref 5–45)
BILIRUB SERPL-MCNC: 0.6 MG/DL (ref 0.2–1)
BUN SERPL-MCNC: 19 MG/DL (ref 5–25)
CALCIUM SERPL-MCNC: 8.9 MG/DL (ref 8.3–10.1)
CHLORIDE SERPL-SCNC: 109 MMOL/L (ref 96–108)
CHOLEST SERPL-MCNC: 211 MG/DL
CO2 SERPL-SCNC: 25 MMOL/L (ref 21–32)
CREAT SERPL-MCNC: 1.39 MG/DL (ref 0.6–1.3)
GFR SERPL CREATININE-BSD FRML MDRD: 45 ML/MIN/1.73SQ M
GLUCOSE P FAST SERPL-MCNC: 99 MG/DL (ref 65–99)
HDLC SERPL-MCNC: 50 MG/DL
LDLC SERPL CALC-MCNC: 133 MG/DL (ref 0–100)
NONHDLC SERPL-MCNC: 161 MG/DL
POTASSIUM SERPL-SCNC: 4 MMOL/L (ref 3.5–5.3)
PROT SERPL-MCNC: 7.5 G/DL (ref 6.4–8.4)
SODIUM SERPL-SCNC: 138 MMOL/L (ref 135–147)
TRIGL SERPL-MCNC: 138 MG/DL

## 2023-02-13 ENCOUNTER — OFFICE VISIT (OUTPATIENT)
Dept: INTERNAL MEDICINE CLINIC | Facility: CLINIC | Age: 88
End: 2023-02-13

## 2023-02-13 VITALS
OXYGEN SATURATION: 97 % | SYSTOLIC BLOOD PRESSURE: 130 MMHG | BODY MASS INDEX: 26.8 KG/M2 | DIASTOLIC BLOOD PRESSURE: 74 MMHG | TEMPERATURE: 97.8 F | HEIGHT: 64 IN | HEART RATE: 92 BPM | WEIGHT: 157 LBS

## 2023-02-13 DIAGNOSIS — I10 HYPERTENSION: Primary | ICD-10-CM

## 2023-02-13 DIAGNOSIS — F33.9 DEPRESSION, RECURRENT (HCC): ICD-10-CM

## 2023-02-13 DIAGNOSIS — N18.30 CKD (CHRONIC KIDNEY DISEASE) STAGE 3, GFR 30-59 ML/MIN (HCC): ICD-10-CM

## 2023-02-13 NOTE — PROGRESS NOTES
Assessment/Plan:      Follow up  Hypertension,  CKD stage  3         Subjective:      Patient ID: Shyann Stock is a 81 y o  male  HPI    The following portions of the patient's history were reviewed and updated as appropriate: allergies, current medications, past family history, past medical history, past social history, past surgical history, and problem list     Review of Systems   Constitutional: Negative  HENT: Negative for dental problem, drooling, ear discharge and ear pain  Eyes: Negative for discharge, redness and itching  Respiratory: Negative for apnea, cough and wheezing  Cardiovascular: Negative for chest pain and palpitations  Gastrointestinal: Negative for abdominal pain, blood in stool, diarrhea and vomiting  Endocrine: Negative for polydipsia, polyphagia and polyuria  Genitourinary: Negative for decreased urine volume, dysuria and frequency  Musculoskeletal: Negative for arthralgias, myalgias and neck stiffness  Skin: Negative for pallor and wound  Allergic/Immunologic: Negative for environmental allergies and food allergies  Neurological: Negative for facial asymmetry, light-headedness, numbness and headaches  Hematological: Negative for adenopathy  Does not bruise/bleed easily  Psychiatric/Behavioral: Negative for agitation, behavioral problems and confusion  Objective:      /74 (BP Location: Left arm, Patient Position: Sitting, Cuff Size: Large)   Pulse 92   Temp 97 8 °F (36 6 °C) (Temporal)   Ht 5' 4" (1 626 m)   Wt 71 2 kg (157 lb)   SpO2 97% Comment: RA  BMI 26 95 kg/m²          Physical Exam  Constitutional:       Appearance: Normal appearance  He is obese  HENT:      Head: Normocephalic  Nose: Nose normal       Mouth/Throat:      Mouth: Mucous membranes are moist    Eyes:      Pupils: Pupils are equal, round, and reactive to light  Cardiovascular:      Rate and Rhythm: Regular rhythm  Heart sounds: Normal heart sounds  Pulmonary:      Breath sounds: Normal breath sounds  Abdominal:      Palpations: Abdomen is soft  Musculoskeletal:         General: No swelling  Cervical back: Neck supple  Skin:     General: Skin is warm  Neurological:      General: No focal deficit present  Mental Status: He is alert and oriented to person, place, and time  Psychiatric:         Mood and Affect: Mood normal        Hypertension   Well   Controlled  With  Amlodipine    CKD  Stage   3      Stable           Fup  4 months      F Abdiel VICENTE,  Ventura Nicolas

## 2023-03-22 DIAGNOSIS — I10 ESSENTIAL HYPERTENSION: ICD-10-CM

## 2023-03-22 RX ORDER — AMLODIPINE BESYLATE 10 MG/1
10 TABLET ORAL DAILY
Qty: 90 TABLET | Refills: 1 | Status: SHIPPED | OUTPATIENT
Start: 2023-03-22

## 2023-06-02 ENCOUNTER — RA CDI HCC (OUTPATIENT)
Dept: OTHER | Facility: HOSPITAL | Age: 88
End: 2023-06-02

## 2023-06-06 ENCOUNTER — APPOINTMENT (OUTPATIENT)
Dept: LAB | Facility: CLINIC | Age: 88
End: 2023-06-06
Payer: MEDICARE

## 2023-06-06 DIAGNOSIS — I10 HYPERTENSION: ICD-10-CM

## 2023-06-06 LAB
ALBUMIN SERPL BCP-MCNC: 3.5 G/DL (ref 3.5–5)
ALP SERPL-CCNC: 89 U/L (ref 46–116)
ALT SERPL W P-5'-P-CCNC: 36 U/L (ref 12–78)
ANION GAP SERPL CALCULATED.3IONS-SCNC: 4 MMOL/L (ref 4–13)
AST SERPL W P-5'-P-CCNC: 24 U/L (ref 5–45)
BILIRUB SERPL-MCNC: 0.67 MG/DL (ref 0.2–1)
BUN SERPL-MCNC: 21 MG/DL (ref 5–25)
CALCIUM SERPL-MCNC: 8.7 MG/DL (ref 8.3–10.1)
CHLORIDE SERPL-SCNC: 110 MMOL/L (ref 96–108)
CO2 SERPL-SCNC: 21 MMOL/L (ref 21–32)
CREAT SERPL-MCNC: 1.54 MG/DL (ref 0.6–1.3)
CREAT UR-MCNC: 161 MG/DL
GFR SERPL CREATININE-BSD FRML MDRD: 40 ML/MIN/1.73SQ M
GLUCOSE P FAST SERPL-MCNC: 104 MG/DL (ref 65–99)
MICROALBUMIN UR-MCNC: 17 MG/L (ref 0–20)
MICROALBUMIN/CREAT 24H UR: 11 MG/G CREATININE (ref 0–30)
POTASSIUM SERPL-SCNC: 3.8 MMOL/L (ref 3.5–5.3)
PROT SERPL-MCNC: 7.6 G/DL (ref 6.4–8.4)
SODIUM SERPL-SCNC: 135 MMOL/L (ref 135–147)

## 2023-06-06 PROCEDURE — 80053 COMPREHEN METABOLIC PANEL: CPT

## 2023-06-06 PROCEDURE — 36415 COLL VENOUS BLD VENIPUNCTURE: CPT

## 2023-06-12 ENCOUNTER — OFFICE VISIT (OUTPATIENT)
Dept: INTERNAL MEDICINE CLINIC | Facility: CLINIC | Age: 88
End: 2023-06-12
Payer: MEDICARE

## 2023-06-12 VITALS
SYSTOLIC BLOOD PRESSURE: 124 MMHG | TEMPERATURE: 98.1 F | OXYGEN SATURATION: 96 % | WEIGHT: 150 LBS | HEART RATE: 83 BPM | HEIGHT: 64 IN | DIASTOLIC BLOOD PRESSURE: 84 MMHG | BODY MASS INDEX: 25.61 KG/M2

## 2023-06-12 DIAGNOSIS — N40.1 BPH WITH OBSTRUCTION/LOWER URINARY TRACT SYMPTOMS: Primary | ICD-10-CM

## 2023-06-12 DIAGNOSIS — I10 HYPERTENSION: ICD-10-CM

## 2023-06-12 DIAGNOSIS — N13.8 BPH WITH OBSTRUCTION/LOWER URINARY TRACT SYMPTOMS: Primary | ICD-10-CM

## 2023-06-12 PROCEDURE — 99212 OFFICE O/P EST SF 10 MIN: CPT | Performed by: INTERNAL MEDICINE

## 2023-06-12 NOTE — PROGRESS NOTES
"Assessment/Plan:    Follow up  Hypertension,BPH  symptoms     Diagnoses and all orders for this visit:    BPH with obstruction/lower urinary tract symptoms  -     Ambulatory Referral to Urology; Future    Hypertension          Subjective:      Patient ID: Travis Andersen is a 81 y o  male  HPI    The following portions of the patient's history were reviewed and updated as appropriate: allergies, current medications, past family history, past medical history, past social history, past surgical history, and problem list     Review of Systems   Constitutional: Negative  HENT: Negative for dental problem, drooling, ear discharge and ear pain  Eyes: Negative for discharge, redness and itching  Respiratory: Negative for apnea, cough and wheezing  Cardiovascular: Negative for chest pain and palpitations  Gastrointestinal: Negative for abdominal pain, blood in stool, diarrhea and vomiting  Endocrine: Negative for polydipsia, polyphagia and polyuria  Genitourinary: Positive for enuresis  Negative for decreased urine volume, dysuria and frequency  Musculoskeletal: Negative for arthralgias, myalgias and neck stiffness  Skin: Negative for pallor and wound  Allergic/Immunologic: Negative for environmental allergies and food allergies  Neurological: Negative for facial asymmetry, light-headedness, numbness and headaches  Hematological: Negative for adenopathy  Does not bruise/bleed easily  Psychiatric/Behavioral: Negative for agitation, behavioral problems and confusion  Objective:      /84 (BP Location: Left arm, Patient Position: Sitting, Cuff Size: Standard)   Pulse 83   Temp 98 1 °F (36 7 °C) (Temporal)   Ht 5' 4\" (1 626 m)   Wt 68 kg (150 lb)   SpO2 96% Comment: room air  BMI 25 75 kg/m²          Physical Exam  Constitutional:       Appearance: Normal appearance  HENT:      Head: Normocephalic        Nose: Nose normal       Mouth/Throat:      Mouth: Mucous membranes are " moist    Eyes:      Pupils: Pupils are equal, round, and reactive to light  Cardiovascular:      Rate and Rhythm: Regular rhythm  Heart sounds: Normal heart sounds  Pulmonary:      Breath sounds: Normal breath sounds  Abdominal:      Palpations: Abdomen is soft  Musculoskeletal:         General: No swelling  Cervical back: Neck supple  Skin:     General: Skin is warm  Neurological:      General: No focal deficit present  Mental Status: He is alert and oriented to person, place, and time     Psychiatric:         Mood and Affect: Mood normal        Hypertension   Well   Controlled  With    Current  meds      Enuresis   Dur  To  BPH      Referred  t  Juju Correia  Urology      Fup  6 months      Lian Garrido MD FACP

## 2023-06-13 ENCOUNTER — TELEPHONE (OUTPATIENT)
Dept: UROLOGY | Facility: MEDICAL CENTER | Age: 88
End: 2023-06-13

## 2023-06-13 NOTE — TELEPHONE ENCOUNTER
Please Triage -   New Patient-     What is the reason for the patients appointment? Patient called stating he was referred to Urology for BPH   Imaging/Lab Results:      Do we accept the patient's insurance or is the patient Self-Pay? Provider & Plan: Rajat Obando  Member ID#: Has the patient had any previous urologist(s)?no        Have patient records been requested? Has the patient had any outside testing done? Does the patient have a personal history of cancer?  Skin cancer       Patient can be reached at :

## 2023-07-12 ENCOUNTER — TELEPHONE (OUTPATIENT)
Dept: INTERNAL MEDICINE CLINIC | Facility: CLINIC | Age: 88
End: 2023-07-12

## 2023-07-12 DIAGNOSIS — H93.19 TINNITUS: Primary | ICD-10-CM

## 2023-07-12 NOTE — TELEPHONE ENCOUNTER
Called and left a message with [facility] phone number for him to call and make an appointment.

## 2023-07-13 ENCOUNTER — OFFICE VISIT (OUTPATIENT)
Dept: UROLOGY | Facility: MEDICAL CENTER | Age: 88
End: 2023-07-13
Payer: MEDICARE

## 2023-07-13 VITALS
BODY MASS INDEX: 26.29 KG/M2 | OXYGEN SATURATION: 98 % | WEIGHT: 154 LBS | HEART RATE: 92 BPM | DIASTOLIC BLOOD PRESSURE: 68 MMHG | SYSTOLIC BLOOD PRESSURE: 128 MMHG | HEIGHT: 64 IN

## 2023-07-13 DIAGNOSIS — N40.1 BPH WITH OBSTRUCTION/LOWER URINARY TRACT SYMPTOMS: ICD-10-CM

## 2023-07-13 DIAGNOSIS — N40.1 BPH WITH URINARY OBSTRUCTION: Primary | ICD-10-CM

## 2023-07-13 DIAGNOSIS — N13.8 BPH WITH OBSTRUCTION/LOWER URINARY TRACT SYMPTOMS: ICD-10-CM

## 2023-07-13 DIAGNOSIS — N13.8 BPH WITH URINARY OBSTRUCTION: Primary | ICD-10-CM

## 2023-07-13 DIAGNOSIS — N28.9 RENAL INSUFFICIENCY: ICD-10-CM

## 2023-07-13 LAB
POST-VOID RESIDUAL VOLUME, ML POC: 18 ML
SL AMB  POCT GLUCOSE, UA: ABNORMAL
SL AMB LEUKOCYTE ESTERASE,UA: ABNORMAL
SL AMB POCT BILIRUBIN,UA: ABNORMAL
SL AMB POCT BLOOD,UA: ABNORMAL
SL AMB POCT CLARITY,UA: CLEAR
SL AMB POCT COLOR,UA: YELLOW
SL AMB POCT KETONES,UA: ABNORMAL
SL AMB POCT NITRITE,UA: ABNORMAL
SL AMB POCT PH,UA: 8
SL AMB POCT SPECIFIC GRAVITY,UA: ABNORMAL
SL AMB POCT URINE PROTEIN: ABNORMAL
SL AMB POCT UROBILINOGEN: 1

## 2023-07-13 PROCEDURE — 99204 OFFICE O/P NEW MOD 45 MIN: CPT | Performed by: UROLOGY

## 2023-07-13 PROCEDURE — 51798 US URINE CAPACITY MEASURE: CPT | Performed by: UROLOGY

## 2023-07-13 PROCEDURE — 81003 URINALYSIS AUTO W/O SCOPE: CPT | Performed by: UROLOGY

## 2023-07-13 RX ORDER — ALFUZOSIN HYDROCHLORIDE 10 MG/1
10 TABLET, EXTENDED RELEASE ORAL DAILY
Qty: 90 TABLET | Refills: 3 | Status: SHIPPED | OUTPATIENT
Start: 2023-07-13

## 2023-07-13 RX ORDER — SENNOSIDES 8.6 MG
650 CAPSULE ORAL EVERY 8 HOURS PRN
COMMUNITY

## 2023-07-13 NOTE — PROGRESS NOTES
HISTORY:    BPH, main symptoms nocturia x4. Daytime has good stream and control, can hold it 3 or 4 hours sometimes. Occasional urgency but not bad. No hematuria infection stones    PSA 3.7 in June 2019         ASSESSMENT / PLAN:    Emptying well with minimal PVR today    BPH, although his symptoms are mainly nocturia. Sometimes that symptom is the hardest to help. We will start alfuzosin 1/day after supper, warned about side effects. If after 6 weeks no help, he will let us know and we will decide if he needs cystoscopy. Otherwise follow-up 6-month    The following portions of the patient's history were reviewed and updated as appropriate: allergies, current medications, past family history, past medical history, past social history, past surgical history and problem list.    Review of Systems   All other systems reviewed and are negative. Objective:     Physical Exam  Constitutional:       Appearance: Normal appearance. Genitourinary:     Comments: Penis testes no lesions prostate minimally enlarged no nodule  Neurological:      Mental Status: He is alert. No results found for: "PSA"]  BUN   Date Value Ref Range Status   06/06/2023 21 5 - 25 mg/dL Final     Creatinine   Date Value Ref Range Status   06/06/2023 1.54 (H) 0.60 - 1.30 mg/dL Final     Comment:     Standardized to IDMS reference method     No components found for: "CBC"      Patient Active Problem List   Diagnosis   • Rectal bleeding   • Encounter for screening fecal occult blood testing   • CKD (chronic kidney disease) stage 3, GFR 30-59 ml/min ([facility])   • Depression, recurrent ([address])        Diagnoses and all orders for this visit:    BPH with urinary obstruction  -     POCT urine dip auto non-scope  -     POCT Measure PVR  -     alfuzosin (UROXATRAL) 10 mg 24 hr tablet;  Take 1 tablet (10 mg total) by mouth daily    Renal insufficiency  -     POCT urine dip auto non-scope  -     POCT Measure PVR    BPH with obstruction/lower urinary tract symptoms  -     POCT urine dip auto non-scope  -     POCT Measure PVR  -     Ambulatory Referral to Urology    Other orders  -     acetaminophen (TYLENOL) 650 mg CR tablet; Take 650 mg by mouth every 8 (eight) hours as needed for mild pain For arthritis pain           Patient ID: Mariann Posada is a 81 y.o. male.       Current Outpatient Medications:   •  amLODIPine (NORVASC) 10 mg tablet, Take 1 tablet (10 mg total) by mouth daily, Disp: 90 tablet, Rfl: 1  •  Multiple Vitamin (multivitamin) tablet, Take 1 tablet by mouth daily, Disp: , Rfl:     Past Medical History:   Diagnosis Date   • Arthritis 1/2000   • Basal cell carcinoma     Head   • Depression 1/1990   • GERD (gastroesophageal reflux disease) 1/1990   • Hyperlipemia    • Hypertension    • Positive fecal occult blood test    • Psoriasis    • Rectal bleeding    • Visual impairment 1/1950    corrected       Past Surgical History:   Procedure Laterality Date   • BASAL CELL CARCINOMA EXCISION     • COLONOSCOPY     • COLONOSCOPY  03/28/2019   • HERNIA REPAIR  2012    Inguinal   • LIPOMA RESECTION      Scalp   • SIGMOIDOSCOPY         Social History

## 2023-07-13 NOTE — PATIENT INSTRUCTIONS
New medicine alfuzosin 1 each day, right after supper. He will know within 6 weeks if it is helping. If after 6 weeks no help, I would stop the medicine and let us know. If we need to, a scope exam will be done in the office to tell us more about the size of prostate enlargement.

## 2023-09-18 DIAGNOSIS — I10 ESSENTIAL HYPERTENSION: ICD-10-CM

## 2023-09-18 RX ORDER — AMLODIPINE BESYLATE 10 MG/1
10 TABLET ORAL DAILY
Qty: 90 TABLET | Refills: 0 | Status: SHIPPED | OUTPATIENT
Start: 2023-09-18

## 2023-10-02 ENCOUNTER — CLINICAL SUPPORT (OUTPATIENT)
Dept: INTERNAL MEDICINE CLINIC | Facility: CLINIC | Age: 88
End: 2023-10-02
Payer: MEDICARE

## 2023-10-02 DIAGNOSIS — Z23 FLU VACCINE NEED: Primary | ICD-10-CM

## 2023-10-02 PROCEDURE — 90662 IIV NO PRSV INCREASED AG IM: CPT

## 2023-10-02 PROCEDURE — G0008 ADMIN INFLUENZA VIRUS VAC: HCPCS

## 2023-12-05 ENCOUNTER — TELEPHONE (OUTPATIENT)
Dept: INTERNAL MEDICINE CLINIC | Facility: CLINIC | Age: 88
End: 2023-12-05

## 2023-12-05 DIAGNOSIS — E78.5 HYPERLIPIDEMIA: ICD-10-CM

## 2023-12-05 DIAGNOSIS — I10 HYPERTENSION: Primary | ICD-10-CM

## 2023-12-05 NOTE — TELEPHONE ENCOUNTER
Patient want to know if you want him to get lab work before his next appointment next week , will have to call him to see how to get it to him [phone]

## 2023-12-06 ENCOUNTER — APPOINTMENT (OUTPATIENT)
Dept: LAB | Facility: CLINIC | Age: 88
End: 2023-12-06
Payer: MEDICARE

## 2023-12-06 DIAGNOSIS — E78.5 HYPERLIPIDEMIA: ICD-10-CM

## 2023-12-06 DIAGNOSIS — I10 HYPERTENSION: ICD-10-CM

## 2023-12-06 LAB
ALBUMIN SERPL BCP-MCNC: 4 G/DL (ref 3.5–5)
ALP SERPL-CCNC: 70 U/L (ref 34–104)
ALT SERPL W P-5'-P-CCNC: 17 U/L (ref 7–52)
ANION GAP SERPL CALCULATED.3IONS-SCNC: 10 MMOL/L
AST SERPL W P-5'-P-CCNC: 17 U/L (ref 13–39)
BILIRUB SERPL-MCNC: 0.62 MG/DL (ref 0.2–1)
BUN SERPL-MCNC: 20 MG/DL (ref 5–25)
CALCIUM SERPL-MCNC: 9.6 MG/DL (ref 8.4–10.2)
CHLORIDE SERPL-SCNC: 109 MMOL/L (ref 96–108)
CHOLEST SERPL-MCNC: 192 MG/DL
CO2 SERPL-SCNC: 22 MMOL/L (ref 21–32)
CREAT SERPL-MCNC: 1.2 MG/DL (ref 0.6–1.3)
GFR SERPL CREATININE-BSD FRML MDRD: 54 ML/MIN/1.73SQ M
GLUCOSE P FAST SERPL-MCNC: 97 MG/DL (ref 65–99)
HDLC SERPL-MCNC: 54 MG/DL
LDLC SERPL CALC-MCNC: 116 MG/DL (ref 0–100)
NONHDLC SERPL-MCNC: 138 MG/DL
POTASSIUM SERPL-SCNC: 3.9 MMOL/L (ref 3.5–5.3)
PROT SERPL-MCNC: 6.8 G/DL (ref 6.4–8.4)
SODIUM SERPL-SCNC: 141 MMOL/L (ref 135–147)
TRIGL SERPL-MCNC: 108 MG/DL

## 2023-12-06 PROCEDURE — 36415 COLL VENOUS BLD VENIPUNCTURE: CPT

## 2023-12-06 PROCEDURE — 80061 LIPID PANEL: CPT

## 2023-12-06 PROCEDURE — 80053 COMPREHEN METABOLIC PANEL: CPT

## 2023-12-11 ENCOUNTER — OFFICE VISIT (OUTPATIENT)
Dept: INTERNAL MEDICINE CLINIC | Facility: CLINIC | Age: 88
End: 2023-12-11
Payer: MEDICARE

## 2023-12-11 VITALS
OXYGEN SATURATION: 98 % | WEIGHT: 158 LBS | TEMPERATURE: 97.5 F | BODY MASS INDEX: 26.98 KG/M2 | HEIGHT: 64 IN | SYSTOLIC BLOOD PRESSURE: 110 MMHG | HEART RATE: 68 BPM | DIASTOLIC BLOOD PRESSURE: 80 MMHG

## 2023-12-11 DIAGNOSIS — Z00.00 ENCOUNTER FOR ANNUAL WELLNESS EXAM IN MEDICARE PATIENT: ICD-10-CM

## 2023-12-11 DIAGNOSIS — R06.00 DYSPNEA: Primary | ICD-10-CM

## 2023-12-11 PROCEDURE — G0439 PPPS, SUBSEQ VISIT: HCPCS | Performed by: INTERNAL MEDICINE

## 2023-12-11 PROCEDURE — 99212 OFFICE O/P EST SF 10 MIN: CPT | Performed by: INTERNAL MEDICINE

## 2023-12-11 NOTE — PROGRESS NOTES
Assessment and Plan:     Problem List Items Addressed This Visit    None  Visit Diagnoses       Encounter for annual wellness exam in Medicare patient    -  Primary             Preventive health issues were discussed with patient, and age appropriate screening tests were ordered as noted in patient's After Visit Summary. Personalized health advice and appropriate referrals for health education or preventive services given if needed, as noted in patient's After Visit Summary.      History of Present Illness:     Patient presents for a Medicare Wellness Visit    HPI   Patient Care Team:  Les Curtis MD as PCP - General (Internal Medicine)  Hong Kendall MD (Colon and Rectal Surgery)     Review of Systems:     Review of Systems     Problem List:     Patient Active Problem List   Diagnosis    Rectal bleeding    Encounter for screening fecal occult blood testing    CKD (chronic kidney disease) stage 3, GFR 30-59 ml/min ([facility])    Depression, recurrent ([address])      Past Medical and Surgical History:     Past Medical History:   Diagnosis Date    Arthritis 1/2000    Basal cell carcinoma     Head    Depression 1/1990    GERD (gastroesophageal reflux disease) 1/1990    Hyperlipemia     Hypertension     Positive fecal occult blood test     Psoriasis     Rectal bleeding     Visual impairment 1/1950    corrected     Past Surgical History:   Procedure Laterality Date    BASAL CELL CARCINOMA EXCISION      COLONOSCOPY      COLONOSCOPY  03/28/2019    HERNIA REPAIR  2012    Inguinal    LIPOMA RESECTION      Scalp    SIGMOIDOSCOPY        Family History:     Family History   Problem Relation Age of Onset    Hypertension Father     Kidney disease Father     Thrombosis Mother         cause of death      Social History:     Social History     Socioeconomic History    Marital status: /Civil Union     Spouse name: None    Number of children: None    Years of education: None    Highest education level: None   Occupational History None   Tobacco Use    Smoking status: Former     Types: Pipe     Start date:      Quit date:      Years since quittin.9    Smokeless tobacco: Never   Substance and Sexual Activity    Alcohol use: Yes     Alcohol/week: 13.0 standard drinks of alcohol     Types: 7 Glasses of wine, 6 Cans of beer per week     Comment: drink with meals    Drug use: Never    Sexual activity: Not Currently     Partners: Female     Birth control/protection: Condom Male   Other Topics Concern    None   Social History Narrative    Marital Status:      As per [facility]     Social Determinants of Health     Financial Resource Strain: Low Risk  (2023)    Overall Financial Resource Strain (CARDIA)     Difficulty of Paying Living Expenses: Not hard at all   Food Insecurity: Not on file   Transportation Needs: No Transportation Needs (2023)    PRAPARE - Transportation     Lack of Transportation (Medical): No     Lack of Transportation (Non-Medical): No   Physical Activity: Not on file   Stress: Not on file   Social Connections: Not on file   Intimate Partner Violence: Not on file   Housing Stability: Not on file      Medications and Allergies:     Current Outpatient Medications   Medication Sig Dispense Refill    acetaminophen (TYLENOL) 650 mg CR tablet Take 650 mg by mouth every 8 (eight) hours as needed for mild pain For arthritis pain      alfuzosin (UROXATRAL) 10 mg 24 hr tablet Take 1 tablet (10 mg total) by mouth daily 90 tablet 3    amLODIPine (NORVASC) 10 mg tablet Take 1 tablet (10 mg total) by mouth daily 90 tablet 0    Multiple Vitamin (multivitamin) tablet Take 1 tablet by mouth daily       No current facility-administered medications for this visit.      No Known Allergies   Immunizations:     Immunization History   Administered Date(s) Administered    COVID-19 MODERNA VACC 0.5 ML IM 2021, 2021    Hep A, ped/adol, 2 dose 2015, 2017    Hepatitis A 2015, 2017 INFLUENZA 10/22/2007, 10/27/2008, 09/20/2012, 09/20/2013, 10/13/2014, 10/14/2015, 11/11/2016, 10/09/2017, 10/25/2018, 10/28/2019, 11/12/2019, 09/16/2020    Influenza, high dose seasonal 0.7 mL 09/14/2021, 10/02/2023    Pneumococcal 10/22/2007    Pneumococcal Conjugate 13-Valent 04/02/2015    Pneumococcal Conjugate PCV 7 10/22/2007    Pneumococcal Polysaccharide PPV23 06/04/2020    Td (adult), Unspecified 09/16/1997, 06/03/2008    Td (adult), adsorbed 09/16/1997, 06/03/2008    Zoster 06/04/2008    influenza, trivalent, adjuvanted 09/16/2020      Health Maintenance:         Topic Date Due    Colorectal Cancer Screening  03/28/2024         Topic Date Due    COVID-19 Vaccine (3 - Guicho Lav series) 04/15/2021      Medicare Screening Tests and Risk Assessments:     Edwin Naidu is here for his Subsequent Wellness visit. Last Medicare Wellness visit information reviewed, patient interviewed and updates made to the record today. Health Risk Assessment:   Patient rates overall health as very good. Patient feels that their physical health rating is same. Patient is very satisfied with their life. Eyesight was rated as same. Hearing was rated as slightly worse. Patient feels that their emotional and mental health rating is same. Patients states they are never, rarely angry. Patient states they are never, rarely unusually tired/fatigued. Pain experienced in the last 7 days has been some. Patient's pain rating has been 2/10. Patient states that he has experienced no weight loss or gain in last 6 months. Fall Risk Screening: In the past year, patient has experienced: no history of falling in past year      Home Safety:  Patient does not have trouble with stairs inside or outside of their home. Patient has working smoke alarms and has working carbon monoxide detector. Home safety hazards include: none. Nutrition:   Current diet is Regular. Medications:   Patient is currently taking over-the-counter supplements.  OTC medications include: see medication list. Patient is able to manage medications. Activities of Daily Living (ADLs)/Instrumental Activities of Daily Living (IADLs):   Walk and transfer into and out of bed and chair?: Yes  Dress and groom yourself?: Yes    Bathe or shower yourself?: Yes    Feed yourself? Yes  Do your laundry/housekeeping?: Yes  Manage your money, pay your bills and track your expenses?: Yes  Make your own meals?: Yes    Do your own shopping?: Yes    Durable Medical Equipment Suppliers  none    Previous Hospitalizations:   Any hospitalizations or ED visits within the last 12 months?: No      Advance Care Planning:   Living will: Yes    Durable POA for healthcare: Yes    Advanced directive: Yes      PREVENTIVE SCREENINGS      Cardiovascular Screening:    General: Screening Not Indicated and History Lipid Disorder      Diabetes Screening:     General: Screening Current      Colorectal Cancer Screening:     General: Screening Not Indicated      Prostate Cancer Screening:    General: Screening Not Indicated      Abdominal Aortic Aneurysm (AAA) Screening:    Risk factors include: tobacco use        Lung Cancer Screening:     General: Screening Not Indicated    Screening, Brief Intervention, and Referral to Treatment (SBIRT)    Screening  Typical number of drinks in a day: 1  Typical number of drinks in a week: 7  Interpretation: Low risk drinking behavior. AUDIT-C Screenin) How often did you have a drink containing alcohol in the past year? 4 or more times a week  2) How many drinks did you have on a typical day when you were drinking in the past year? 1 to 2  3) How often did you have 6 or more drinks on one occasion in the past year? never    AUDIT-C Score: 4  Interpretation: Score 4-12 (male): POSITIVE screen for alcohol misuse    AUDIT Screenin) How often during the last year have you found that you were not able to stop drinking once you had started?  0 - never  5) How often during the last year have you failed to do what was normally expected from you because of drinking? 0 - never  6) How often during the last year have you needed a first drink in the morning to get yourself going after a heavy drinking session? 0 - never  7) How often during the last year have you had a feeling of guilt or remorse after drinking? 0 - never  8) How often during the last year have you been unable to remember what happened the night before because you had been drinking? 0 - never  9) Have you or someone else been injured as a result of your drinking? 0 - no  10) Has a relative or friend or a doctor or another health worker been concerned about your drinking or suggested you cut down? 0 - no    AUDIT Score: 4  Interpretation: Low risk alcohol consumption    Single Item Drug Screening:  How often have you used an illegal drug (including marijuana) or a prescription medication for non-medical reasons in the past year? never    Single Item Drug Screen Score: 0  Interpretation: Negative screen for possible drug use disorder    No results found.      Physical Exam:     Ht 5' 4" (1.626 m)   Wt 71.7 kg (158 lb)   BMI 27.12 kg/m²     Physical Exam     Evelina Fraser MD

## 2023-12-13 ENCOUNTER — HOSPITAL ENCOUNTER (OUTPATIENT)
Dept: NON INVASIVE DIAGNOSTICS | Facility: HOSPITAL | Age: 88
Discharge: HOME | End: 2023-12-13
Payer: MEDICARE

## 2023-12-13 VITALS
WEIGHT: 158 LBS | DIASTOLIC BLOOD PRESSURE: 80 MMHG | HEIGHT: 64 IN | BODY MASS INDEX: 26.98 KG/M2 | HEART RATE: 68 BPM | SYSTOLIC BLOOD PRESSURE: 110 MMHG

## 2023-12-13 DIAGNOSIS — R06.00 DYSPNEA: ICD-10-CM

## 2023-12-13 LAB
AORTIC ROOT: 2.9 CM
AORTIC VALVE MEAN VELOCITY: 15.8 M/S
APICAL FOUR CHAMBER EJECTION FRACTION: 55 %
ASCENDING AORTA: 3.7 CM
AV AREA BY CONTINUOUS VTI: 1.2 CM2
AV AREA PEAK VELOCITY: 1.1 CM2
AV LVOT MEAN GRADIENT: 1 MMHG
AV LVOT PEAK GRADIENT: 3 MMHG
AV MEAN GRADIENT: 11 MMHG
AV PEAK GRADIENT: 18 MMHG
AV VALVE AREA: 1.2 CM2
AV VELOCITY RATIO: 0.4
DOP CALC AO PEAK VEL: 2.11 M/S
DOP CALC AO VTI: 45.81 CM
DOP CALC LVOT AREA: 2.83 CM2
DOP CALC LVOT CARDIAC INDEX: 2.54 L/MIN/M2
DOP CALC LVOT CARDIAC OUTPUT: 4.49 L/MIN
DOP CALC LVOT DIAMETER: 1.9 CM
DOP CALC LVOT PEAK VEL VTI: 19.36 CM
DOP CALC LVOT PEAK VEL: 0.84 M/S
DOP CALC LVOT STROKE INDEX: 30.5 ML/M2
DOP CALC LVOT STROKE VOLUME: 54.86
E WAVE DECELERATION TIME: 217 MS
E/A RATIO: 0.91
FRACTIONAL SHORTENING: 31 (ref 28–44)
INTERVENTRICULAR SEPTUM IN DIASTOLE (PARASTERNAL SHORT AXIS VIEW): 1 CM
INTERVENTRICULAR SEPTUM: 1 CM (ref 0.6–1.1)
LAAS-AP2: 23 CM2
LAAS-AP4: 20.1 CM2
LEFT ATRIUM SIZE: 3.4 CM
LEFT ATRIUM VOLUME (MOD BIPLANE): 66 ML
LEFT ATRIUM VOLUME INDEX (MOD BIPLANE): 37.3 ML/M2
LEFT INTERNAL DIMENSION IN SYSTOLE: 3.3 CM (ref 2.1–4)
LEFT VENTRICLE DIASTOLIC VOLUME (MOD BIPLANE): 58 ML
LEFT VENTRICLE SYSTOLIC VOLUME (MOD BIPLANE): 25 ML
LEFT VENTRICULAR INTERNAL DIMENSION IN DIASTOLE: 4.8 CM (ref 3.5–6)
LEFT VENTRICULAR POSTERIOR WALL IN END DIASTOLE: 1 CM
LEFT VENTRICULAR STROKE VOLUME: 65 ML
LV EF: 56 %
LVSV (TEICH): 65 ML
MV E'TISSUE VEL-SEP: 8 CM/S
MV PEAK A VEL: 1.07 M/S
MV PEAK E VEL: 97 CM/S
MV STENOSIS PRESSURE HALF TIME: 63 MS
MV VALVE AREA P 1/2 METHOD: 3.49
RA PRESSURE ESTIMATED: 3 MMHG
RIGHT ATRIUM AREA SYSTOLE A4C: 17.9 CM2
RIGHT VENTRICLE ID DIMENSION: 3.8 CM
RV PSP: 21 MMHG
SL CV LEFT ATRIUM LENGTH A2C: 5.5 CM
SL CV LV EF: 60
SL CV PED ECHO LEFT VENTRICLE DIASTOLIC VOLUME (MOD BIPLANE) 2D: 109 ML
SL CV PED ECHO LEFT VENTRICLE SYSTOLIC VOLUME (MOD BIPLANE) 2D: 44 ML
TR MAX PG: 18 MMHG
TR PEAK VELOCITY: 2.2 M/S
TRICUSPID ANNULAR PLANE SYSTOLIC EXCURSION: 2.1 CM
TRICUSPID VALVE PEAK REGURGITATION VELOCITY: 2.15 M/S

## 2023-12-13 PROCEDURE — 93306 TTE W/DOPPLER COMPLETE: CPT

## 2023-12-15 DIAGNOSIS — I10 ESSENTIAL HYPERTENSION: ICD-10-CM

## 2023-12-15 RX ORDER — AMLODIPINE BESYLATE 10 MG/1
10 TABLET ORAL DAILY
Qty: 90 TABLET | Refills: 0 | Status: SHIPPED | OUTPATIENT
Start: 2023-12-15

## 2023-12-18 ENCOUNTER — TELEPHONE (OUTPATIENT)
Dept: INTERNAL MEDICINE CLINIC | Facility: CLINIC | Age: 88
End: 2023-12-18

## 2023-12-18 NOTE — TELEPHONE ENCOUNTER
Patient left a message asking for you to call him. He stated he is returning your call from Friday afternoon, regarding test results.       # [phone]

## 2024-02-21 PROBLEM — Z12.11 ENCOUNTER FOR SCREENING FECAL OCCULT BLOOD TESTING: Status: RESOLVED | Noted: 2019-03-20 | Resolved: 2024-02-21

## 2024-03-14 DIAGNOSIS — I10 ESSENTIAL HYPERTENSION: ICD-10-CM

## 2024-03-14 RX ORDER — AMLODIPINE BESYLATE 10 MG/1
10 TABLET ORAL DAILY
Qty: 90 TABLET | Refills: 0 | Status: SHIPPED | OUTPATIENT
Start: 2024-03-14

## 2024-03-27 ENCOUNTER — TELEPHONE (OUTPATIENT)
Age: 89
End: 2024-03-27

## 2024-04-17 ENCOUNTER — OFFICE VISIT (OUTPATIENT)
Dept: INTERNAL MEDICINE CLINIC | Facility: CLINIC | Age: 89
End: 2024-04-17
Payer: MEDICARE

## 2024-04-17 VITALS
OXYGEN SATURATION: 97 % | SYSTOLIC BLOOD PRESSURE: 152 MMHG | HEIGHT: 64 IN | HEART RATE: 83 BPM | DIASTOLIC BLOOD PRESSURE: 72 MMHG | WEIGHT: 159 LBS | BODY MASS INDEX: 27.14 KG/M2 | TEMPERATURE: 98 F

## 2024-04-17 DIAGNOSIS — I10 HYPERTENSION: Primary | ICD-10-CM

## 2024-04-17 PROCEDURE — 99213 OFFICE O/P EST LOW 20 MIN: CPT | Performed by: INTERNAL MEDICINE

## 2024-04-17 PROCEDURE — G2211 COMPLEX E/M VISIT ADD ON: HCPCS | Performed by: INTERNAL MEDICINE

## 2024-04-17 NOTE — PROGRESS NOTES
"Assessment/Plan:    Follow up  Hypertension  ,BPH  symptoms     Diagnoses and all orders for this visit:    Hypertension          Subjective: No  complaints   Patient ID: Christian Jones is a 88 y.o. male.    HPIMedical   problems  all  stable.    The following portions of the patient's history were reviewed and updated as appropriate: allergies, current medications, past family history, past medical history, past social history, past surgical history, and problem list.    Review of Systems   Constitutional: Negative.    HENT:  Negative for dental problem, drooling, ear discharge and ear pain.    Eyes:  Negative for discharge, redness and itching.   Respiratory:  Negative for apnea, cough and wheezing.    Cardiovascular:  Negative for chest pain and palpitations.   Gastrointestinal:  Negative for abdominal pain, blood in stool, diarrhea and vomiting.   Endocrine: Negative for polydipsia, polyphagia and polyuria.   Genitourinary:  Negative for decreased urine volume, dysuria and frequency.   Musculoskeletal:  Negative for arthralgias, myalgias and neck stiffness.   Skin:  Negative for pallor and wound.   Allergic/Immunologic: Negative for environmental allergies and food allergies.   Neurological:  Negative for facial asymmetry, light-headedness, numbness and headaches.   Hematological:  Negative for adenopathy. Does not bruise/bleed easily.   Psychiatric/Behavioral:  Negative for agitation, behavioral problems and confusion.          Objective:      /72 (BP Location: Left arm, Patient Position: Sitting, Cuff Size: Standard)   Pulse 83   Temp 98 °F (36.7 °C) (Temporal)   Ht 5' 4\" (1.626 m)   Wt 72.1 kg (159 lb)   SpO2 97%   BMI 27.29 kg/m²          Physical Exam  Constitutional:       Appearance: Normal appearance.   HENT:      Head: Normocephalic.      Nose: Nose normal.      Mouth/Throat:      Mouth: Mucous membranes are moist.   Eyes:      Pupils: Pupils are equal, round, and reactive to light. "   Cardiovascular:      Rate and Rhythm: Regular rhythm.      Heart sounds: Normal heart sounds.   Pulmonary:      Breath sounds: Normal breath sounds.   Abdominal:      Palpations: Abdomen is soft.   Musculoskeletal:         General: No swelling.      Cervical back: Neck supple.   Skin:     General: Skin is warm.   Neurological:      General: No focal deficit present.      Mental Status: He is alert and oriented to person, place, and time.   Psychiatric:         Mood and Affect: Mood normal.       Problem #1  Hypertension   controlled  with  Amlodipine  BP  today  120/80   Problem#2   BPH  symptoms   controlled  with  Uroxotral      Fup  4 months.      Marvin Meadows MD.FACP

## 2024-06-04 DIAGNOSIS — I10 ESSENTIAL HYPERTENSION: ICD-10-CM

## 2024-06-04 RX ORDER — AMLODIPINE BESYLATE 10 MG/1
10 TABLET ORAL DAILY
Qty: 90 TABLET | Refills: 1 | Status: SHIPPED | OUTPATIENT
Start: 2024-06-04

## 2024-06-04 NOTE — TELEPHONE ENCOUNTER
Reason for call:   [x] Refill   [] Prior Auth  [] Other:     Office:   [x] PCP/Provider - Dr Meadows  [] Specialty/Provider -     Medication:   Amlodipine 10 mg, 1 qd, 90    Pharm[facility]   [facility] [address] [facility]    Does the patient have enough for 3 days?   [x] Yes   [] No - Send as HP to POD

## 2024-07-11 DIAGNOSIS — N40.1 BPH WITH URINARY OBSTRUCTION: ICD-10-CM

## 2024-07-11 DIAGNOSIS — N13.8 BPH WITH URINARY OBSTRUCTION: ICD-10-CM

## 2024-07-11 RX ORDER — ALFUZOSIN HYDROCHLORIDE 10 MG/1
10 TABLET, EXTENDED RELEASE ORAL DAILY
Qty: 30 TABLET | Refills: 0 | Status: SHIPPED | OUTPATIENT
Start: 2024-07-11

## 2024-07-22 ENCOUNTER — OFFICE VISIT (OUTPATIENT)
Dept: URGENT CARE | Facility: CLINIC | Age: 89
End: 2024-07-22
Payer: MEDICARE

## 2024-07-22 VITALS
TEMPERATURE: 98.8 F | DIASTOLIC BLOOD PRESSURE: 78 MMHG | RESPIRATION RATE: 18 BRPM | OXYGEN SATURATION: 98 % | HEART RATE: 116 BPM | SYSTOLIC BLOOD PRESSURE: 147 MMHG

## 2024-07-22 DIAGNOSIS — B96.89 ACUTE BACTERIAL BRONCHITIS: Primary | ICD-10-CM

## 2024-07-22 DIAGNOSIS — J20.8 ACUTE BACTERIAL BRONCHITIS: Primary | ICD-10-CM

## 2024-07-22 PROCEDURE — G0463 HOSPITAL OUTPT CLINIC VISIT: HCPCS | Performed by: NURSE PRACTITIONER

## 2024-07-22 PROCEDURE — 99213 OFFICE O/P EST LOW 20 MIN: CPT | Performed by: NURSE PRACTITIONER

## 2024-07-22 RX ORDER — PREDNISONE 20 MG/1
20 TABLET ORAL 2 TIMES DAILY WITH MEALS
Qty: 10 TABLET | Refills: 0 | Status: SHIPPED | OUTPATIENT
Start: 2024-07-22 | End: 2024-07-27

## 2024-07-22 RX ORDER — AMOXICILLIN AND CLAVULANATE POTASSIUM 875; 125 MG/1; MG/1
1 TABLET, FILM COATED ORAL EVERY 12 HOURS SCHEDULED
Qty: 14 TABLET | Refills: 0 | Status: SHIPPED | OUTPATIENT
Start: 2024-07-22 | End: 2024-07-29

## 2024-07-22 NOTE — PATIENT INSTRUCTIONS
"Patient Education     Acute bronchitis in adults   The Basics   Written by the doctors and editors at [facility]   What is bronchitis? -- This is an infection that causes a cough. It happens when the tubes that carry air into the lungs, called the \"bronchi,\" get infected (figure 1).  Usually, bronchitis happens after a person gets a cold or the flu. The viruses that cause the cold or flu infect the bronchi and irritate them. Antibiotics do not help bronchitis.  Bronchitis can also happen when a person gets an infection called \"whooping cough,\" but this is much less common. Whooping cough is caused by bacteria that can infect the bronchi. Most people get vaccines to prevent whooping cough, but the vaccine doesn't always work. Your doctor will be able to tell if you have whooping cough by doing an exam and listening to your cough.  This article is about \"acute\" bronchitis. This is different from \"chronic\" bronchitis, which is a lung disease that most often affects people who smoke.  What are the symptoms of bronchitis? -- The most common symptoms are:   A cough that can last up to a few weeks   Coughing up mucus that is clear, yellow, or green - Green mucus does not always mean that you have a bacterial infection.  You might also have other cold or flu symptoms, like a stuffy nose, sore throat, or headache. People with bronchitis do not usually get a fever.  Will I need tests? -- Most people with bronchitis do not need a test. But if your doctor or nurse is not sure what is causing your cough, they might do tests. For example, they might order a chest X-ray. Or if they think that you might have COVID-19, they will test you for the virus that causes the infection.  How is bronchitis treated? -- Bronchitis almost always goes away on its own. But the cough can take up to 3 weeks to get better, and sometimes even longer.  Doctors do not usually treat bronchitis with antibiotic medicines. That's because bronchitis is usually " caused by a virus, and antibiotics kill bacteria, not viruses. Also, antibiotics can actually cause other problems.  To feel better, you can treat your cold and flu symptoms. You can:   Get lots of rest, and drink plenty of liquids.   Drink hot tea.   Suck on cough drops or hard candy.   Take over-the-counter cough and cold medicines.   Breath in warm, moist air, such as in the shower, over a kettle, or from a humidifier.   Take a pain-relieving medicine if you have cold or flu symptoms like headache, muscle aches, or joint pain.  Avoid smoking or being around others who smoke. This can make your cough worse.  How can I keep from getting bronchitis again? -- You can reduce your chance of getting bronchitis again by keeping the germs that cause bronchitis out of your body. One of the best ways to do this is to wash your hands often with soap and water. If there is no sink nearby, you can use a hand gel with alcohol in it to clean your hands.  How can I keep from spreading germs? -- In addition to washing your hands often, cover your mouth with your elbow when you sneeze or cough. Using your elbow keeps you from getting germs on your hands. If you use a tissue, throw the tissue away and wash your hands.  When should I call the doctor? -- Call for advice if you have:   A fever higher than 100.4°F (38°C), or chills   Chest pain when you cough, trouble breathing, or coughing up blood   A barking cough that makes it hard to talk   Cough and weight loss that you cannot explain   Symptoms that are not getting better after 3 weeks  All topics are updated as new evidence becomes available and our peer review process is complete.  This topic retrieved from [employer] on: May 16, 2024.  Topic 49550 Version 17.0  Release: 32.4.3 - C32.135  © 2024 UpToDate, Inc. and/or its affiliates. All rights reserved.  figure 1: Normal lungs     The lungs sit in the chest, inside the ribcage. They are covered with a thin membrane called the  "\"pleura.\" The windpipe, or trachea, branches into 2 smaller airways called the left and right \"bronchi.\" The space between the lungs is called the \"mediastinum.\" Lymph nodes are located within and around the lungs and mediastinum.  Graphic 11789 Version 14.0  Consumer Information Use and Disclaimer   Disclaimer: This generalized information is a limited summary of diagnosis, treatment, and/or medication information. It is not meant to be comprehensive and should be used as a tool to help the user understand and/or assess potential diagnostic and treatment options. It does NOT include all information about conditions, treatments, medications, side effects, or risks that may apply to a specific patient. It is not intended to be medical advice or a substitute for the medical advice, diagnosis, or treatment of a health care provider based on the health care provider's examination and assessment of a patient's specific and unique circumstances. Patients must speak with a health care provider for complete information about their health, medical questions, and treatment options, including any risks or benefits regarding use of medications. This information does not endorse any treatments or medications as safe, effective, or approved for treating a specific patient. UpToDate, Inc. and its affiliates disclaim any warranty or liability relating to this information or the use thereof.The use of this information is governed by the Terms of Use, available at https://www.wolRackHuntuwer.com/en/know/clinical-effectiveness-terms. 2024© UpToDate, Inc. and its affiliates and/or licensors. All rights reserved.  Copyright   © 2024 UpToDate, Inc. and/or its affiliates. All rights reserved.    "

## 2024-07-22 NOTE — PROGRESS NOTES
[facility]        NAME: Christian Jones is a 88 y.o. male  : 1935    MRN: [id number]  DATE: 2024  TIME: 9:11 AM    Assessment and Plan   Acute bacterial bronchitis [J20.8, B96.89]  1. Acute bacterial bronchitis  amoxicillin-clavulanate (AUGMENTIN) 875-125 mg per tablet    predniSONE 20 mg tablet        Ongoing symptoms for 3 weeks.  Will start course of Augmentin and some prednisone.  Patient agreement with plan.    Patient Instructions     Follow up with PCP in 3-5 days.  Proceed to  ER if symptoms worsen.    Chief Complaint     Chief Complaint   Patient presents with    Cough     Stuffy nose at times. Cough started 3 weeks ago. Taking benzonatate as needed.          History of Present Illness   Christian Jones presents to the clinic c/o    Cough (Stuffy nose at times. Cough started 3 weeks ago. Taking benzonatate as needed. )  Patient states was seen initially at [facility] initially and they provided him  with a prescription for Tessalon Perles.  He states it does decrease the cough however the postnasal drip and sinus activities still continuing.  Postnasal drip makes the cough worse so when he has breakthrough cough spells he is bringing up lots of mucus.  He does note some whistling coming from his chest at times.  Does usually clear after coughing.  Denies any fevers or chills.  Denies any shortness of breath.  He states his wife is homebound and he has a primary care provider so he wants to start to feel better sooner.  He did take an antihistamine this morning which seemed to dry things up a little bit        Review of Systems   Review of Systems   All other systems reviewed and are negative.        Current Medications     Long-Term Medications   Medication Sig Dispense Refill    alfuzosin (UROXATRAL) 10 mg 24 hr tablet TAKE 1 TABLET BY MOUTH EVERY DAY 30 tablet 0    amLODIPine (NORVASC) 10 mg tablet Take 1 tablet (10 mg total) by mouth daily 90 tablet 1     Multiple Vitamin (multivitamin) tablet Take 1 tablet by mouth daily         Current Allergies     Allergies as of 07/22/2024    (No Known Allergies)            The following portions of the patient's history were reviewed and updated as appropriate: allergies, current medications, past family history, past medical history, past social history, past surgical history and problem list.    Objective   /78   Pulse (!) 116   Temp 98.8 °F (37.1 °C) (Tympanic)   Resp 18   SpO2 98%        Physical Exam     Physical Exam  Vitals and nursing note reviewed.   Constitutional:       Appearance: Normal appearance. He is well-developed.   HENT:      Head: Normocephalic and atraumatic.      Right Ear: Tympanic membrane, ear canal and external ear normal.      Left Ear: Tympanic membrane, ear canal and external ear normal.      Nose: Mucosal edema and congestion present.      Right Sinus: No maxillary sinus tenderness or frontal sinus tenderness.      Left Sinus: No maxillary sinus tenderness or frontal sinus tenderness.      Mouth/Throat:      Mouth: Mucous membranes are moist.   Eyes:      General: Lids are normal.      Conjunctiva/sclera: Conjunctivae normal.      Pupils: Pupils are equal, round, and reactive to light.   Cardiovascular:      Rate and Rhythm: Normal rate and regular rhythm.      Pulses: Normal pulses.      Heart sounds: Normal heart sounds, S1 normal and S2 normal.   Pulmonary:      Effort: Pulmonary effort is normal.      Breath sounds: Wheezing present.   Musculoskeletal:      Cervical back: Normal range of motion and neck supple.   Skin:     General: Skin is warm and dry.   Neurological:      Mental Status: He is alert.   Psychiatric:         Speech: Speech normal.         Behavior: Behavior normal.         Thought Content: Thought content normal.         Judgment: Judgment normal.

## 2024-08-27 ENCOUNTER — RA CDI HCC (OUTPATIENT)
Dept: OTHER | Facility: HOSPITAL | Age: 89
End: 2024-08-27

## 2024-08-31 DIAGNOSIS — N40.1 BPH WITH URINARY OBSTRUCTION: ICD-10-CM

## 2024-08-31 DIAGNOSIS — N13.8 BPH WITH URINARY OBSTRUCTION: ICD-10-CM

## 2024-09-03 RX ORDER — ALFUZOSIN HYDROCHLORIDE 10 MG/1
10 TABLET, EXTENDED RELEASE ORAL DAILY
Qty: 30 TABLET | Refills: 0 | Status: SHIPPED | OUTPATIENT
Start: 2024-09-03

## 2024-09-13 ENCOUNTER — OFFICE VISIT (OUTPATIENT)
Dept: INTERNAL MEDICINE CLINIC | Facility: CLINIC | Age: 89
End: 2024-09-13
Payer: COMMERCIAL

## 2024-09-13 VITALS
TEMPERATURE: 98.2 F | SYSTOLIC BLOOD PRESSURE: 140 MMHG | DIASTOLIC BLOOD PRESSURE: 80 MMHG | HEIGHT: 64 IN | HEART RATE: 88 BPM | OXYGEN SATURATION: 98 % | BODY MASS INDEX: 25.95 KG/M2 | WEIGHT: 152 LBS

## 2024-09-13 DIAGNOSIS — I73.9 PERIPHERAL VASCULAR DISEASE (HCC): ICD-10-CM

## 2024-09-13 DIAGNOSIS — Z12.11 SCREENING FOR COLON CANCER: ICD-10-CM

## 2024-09-13 DIAGNOSIS — F33.9 DEPRESSION, RECURRENT (HCC): ICD-10-CM

## 2024-09-13 DIAGNOSIS — I10 HYPERTENSION: Primary | ICD-10-CM

## 2024-09-13 DIAGNOSIS — N18.30 CKD (CHRONIC KIDNEY DISEASE) STAGE 3, GFR 30-59 ML/MIN (HCC): ICD-10-CM

## 2024-09-13 PROCEDURE — 99213 OFFICE O/P EST LOW 20 MIN: CPT | Performed by: INTERNAL MEDICINE

## 2024-09-13 NOTE — PROGRESS NOTES
Assessment/Plan:    Follow up  Hypertension, Hyperlipidemia,  Aortic valve  stenosis     Diagnoses and all orders for this visit:    Hypertension  -     Lipid panel; Future  -     Comprehensive metabolic panel; Future    Screening for colon cancer  -     Ambulatory Referral to Gastroenterology; Future    Peripheral vascular disease (HCC)    Depression, recurrent (HCC)    CKD (chronic kidney disease) stage 3, GFR 30-59 ml/min (HCC)          Subjective: Moderate  dyspnea  on  exertion     Patient ID: Christian Jones is a 88 y.o. male.    HPI All his  medical  problems  seem  to  be  stable.        The following portions of the patient's history were reviewed and updated as appropriate: allergies, current medications, past family history, past medical history, past social history, past surgical history, and problem list.    Review of Systems   Constitutional: Negative.    HENT:  Negative for dental problem, drooling, ear discharge and ear pain.    Eyes:  Negative for discharge, redness and itching.   Respiratory:  Positive for shortness of breath. Negative for apnea, cough and wheezing.    Cardiovascular:  Negative for chest pain and palpitations.   Gastrointestinal:  Negative for abdominal pain, blood in stool, diarrhea and vomiting.   Endocrine: Negative for polydipsia, polyphagia and polyuria.   Genitourinary:  Negative for decreased urine volume, dysuria and frequency.   Musculoskeletal:  Negative for arthralgias, myalgias and neck stiffness.   Skin:  Negative for pallor and wound.   Allergic/Immunologic: Negative for environmental allergies and food allergies.   Neurological:  Negative for facial asymmetry, light-headedness, numbness and headaches.   Hematological:  Negative for adenopathy. Does not bruise/bleed easily.   Psychiatric/Behavioral:  Negative for agitation, behavioral problems and confusion.          Objective:      /80 (BP Location: Left arm, Patient Position: Sitting, Cuff Size: Standard)    "Pulse 88   Temp 98.2 °F (36.8 °C) (Temporal)   Ht 5' 4\" (1.626 m)   Wt 68.9 kg (152 lb)   SpO2 98%   BMI 26.09 kg/m²          Physical Exam  Constitutional:       Appearance: Normal appearance.   HENT:      Head: Normocephalic.      Nose: Nose normal.      Mouth/Throat:      Mouth: Mucous membranes are moist.   Eyes:      Pupils: Pupils are equal, round, and reactive to light.   Cardiovascular:      Rate and Rhythm: Rhythm irregular.      Heart sounds: Murmur heard.   Pulmonary:      Breath sounds: Normal breath sounds.   Abdominal:      Palpations: Abdomen is soft.   Musculoskeletal:         General: No swelling.      Cervical back: Neck supple.   Skin:     General: Skin is warm.   Neurological:      General: No focal deficit present.      Mental Status: He is alert and oriented to person, place, and time.   Psychiatric:         Mood and Affect: Mood normal.       Problem#1  Hypertension   Well  controlled on  current  meds  /80    Problem#2  Aortic valve  stenosis     Last echo  done in    Dec  2023    showed  moderate aortic  valve  stenosis.  Will  recheck  echo  in  another  6 months.     Blood work  ordered.  Follow up urology for his  prostate with PSA    Fup  4 months.     Kel Meadows MD.FACP  "

## 2024-09-14 ENCOUNTER — APPOINTMENT (OUTPATIENT)
Dept: LAB | Facility: CLINIC | Age: 89
End: 2024-09-14
Payer: MEDICARE

## 2024-09-14 DIAGNOSIS — I10 HYPERTENSION: ICD-10-CM

## 2024-09-14 LAB
ALBUMIN SERPL BCG-MCNC: 4 G/DL (ref 3.5–5)
ALP SERPL-CCNC: 71 U/L (ref 34–104)
ALT SERPL W P-5'-P-CCNC: 15 U/L (ref 7–52)
ANION GAP SERPL CALCULATED.3IONS-SCNC: 8 MMOL/L (ref 4–13)
AST SERPL W P-5'-P-CCNC: 15 U/L (ref 13–39)
BILIRUB SERPL-MCNC: 0.67 MG/DL (ref 0.2–1)
BUN SERPL-MCNC: 23 MG/DL (ref 5–25)
CALCIUM SERPL-MCNC: 9 MG/DL (ref 8.4–10.2)
CHLORIDE SERPL-SCNC: 109 MMOL/L (ref 96–108)
CHOLEST SERPL-MCNC: 200 MG/DL
CO2 SERPL-SCNC: 22 MMOL/L (ref 21–32)
CREAT SERPL-MCNC: 1.27 MG/DL (ref 0.6–1.3)
GFR SERPL CREATININE-BSD FRML MDRD: 50 ML/MIN/1.73SQ M
GLUCOSE P FAST SERPL-MCNC: 96 MG/DL (ref 65–99)
HDLC SERPL-MCNC: 50 MG/DL
LDLC SERPL CALC-MCNC: 123 MG/DL (ref 0–100)
NONHDLC SERPL-MCNC: 150 MG/DL
POTASSIUM SERPL-SCNC: 4.3 MMOL/L (ref 3.5–5.3)
PROT SERPL-MCNC: 7 G/DL (ref 6.4–8.4)
SODIUM SERPL-SCNC: 139 MMOL/L (ref 135–147)
TRIGL SERPL-MCNC: 133 MG/DL

## 2024-09-14 PROCEDURE — 80053 COMPREHEN METABOLIC PANEL: CPT

## 2024-09-14 PROCEDURE — 36415 COLL VENOUS BLD VENIPUNCTURE: CPT

## 2024-09-14 PROCEDURE — 80061 LIPID PANEL: CPT

## 2024-09-16 ENCOUNTER — OFFICE VISIT (OUTPATIENT)
Dept: UROLOGY | Facility: MEDICAL CENTER | Age: 89
End: 2024-09-16
Payer: MEDICARE

## 2024-09-16 VITALS
DIASTOLIC BLOOD PRESSURE: 74 MMHG | HEIGHT: 64 IN | OXYGEN SATURATION: 98 % | WEIGHT: 154 LBS | BODY MASS INDEX: 26.29 KG/M2 | SYSTOLIC BLOOD PRESSURE: 116 MMHG | HEART RATE: 88 BPM

## 2024-09-16 DIAGNOSIS — N13.8 BPH WITH URINARY OBSTRUCTION: Primary | ICD-10-CM

## 2024-09-16 DIAGNOSIS — N40.1 BPH WITH URINARY OBSTRUCTION: Primary | ICD-10-CM

## 2024-09-16 DIAGNOSIS — R39.15 URGENCY OF URINATION: ICD-10-CM

## 2024-09-16 LAB — POST-VOID RESIDUAL VOLUME, ML POC: 19 ML

## 2024-09-16 PROCEDURE — 99213 OFFICE O/P EST LOW 20 MIN: CPT | Performed by: UROLOGY

## 2024-09-16 PROCEDURE — 51798 US URINE CAPACITY MEASURE: CPT | Performed by: UROLOGY

## 2024-09-16 NOTE — PROGRESS NOTES
"   HISTORY:    Follow-up for BPH.  He is currently taking alfuzosin every other day.  He thinks it taken that the same day as his amlodipine makes him mentally foggy in the morning.    So-so help with his urination, still has nocturia x 3, daytime not so much problem.    No hematuria infection stones    PVR only 15 mL today, emptying well         ASSESSMENT / PLAN:    He is taking both of his meds in the morning after breakfast.    I recommend he take the alfuzosin in the evening right after dinner.  He will then decide when to start taking it every day, and see if that is tolerable    He will let me know how it goes    Follow-up 1 year, does not need PSA    The following portions of the patient's history were reviewed and updated as appropriate: allergies, current medications, past family history, past medical history, past social history, past surgical history, and problem list.    Review of Systems      Objective:     Physical Exam  Genitourinary:     Comments: Penis testes normal    Prostate mildly enlarged no nodules          No results found for: \"PSA\"]  BUN   Date Value Ref Range Status   09/14/2024 23 5 - 25 mg/dL Final   08/07/2021 18 7 - 28 mg/dL Final     Creatinine   Date Value Ref Range Status   09/14/2024 1.27 0.60 - 1.30 mg/dL Final     Comment:     Standardized to IDMS reference method   08/07/2021 1.30 0.53 - 1.30 mg/dL Final     No components found for: \"CBC\"      Patient Active Problem List   Diagnosis    Rectal bleeding    CKD (chronic kidney disease) stage 3, GFR 30-59 ml/min ([facility])    Depression, recurrent ([facility])    Peripheral vascular disease ([facility])        Diagnoses and all orders for this visit:    BPH with urinary obstruction  -     POCT Measure PVR    Urgency of urination  -     POCT Measure PVR           Patient ID: Christian Jones is a 88 y.o. male.      Current Outpatient Medications:     acetaminophen (TYLENOL) 650 mg CR tablet, Take 650 mg by mouth every 8 (eight) hours as needed for " mild pain For arthritis pain, Disp: , Rfl:     alfuzosin (UROXATRAL) 10 mg 24 hr tablet, TAKE 1 TABLET BY MOUTH EVERY DAY, Disp: 30 tablet, Rfl: 0    amLODIPine (NORVASC) 10 mg tablet, Take 1 tablet (10 mg total) by mouth daily, Disp: 90 tablet, Rfl: 1    Multiple Vitamin (multivitamin) tablet, Take 1 tablet by mouth daily, Disp: , Rfl:     Past Medical History:   Diagnosis Date    Arthritis 1/2000    Basal cell carcinoma     Head    Depression 1/1990    GERD (gastroesophageal reflux disease) 1/1990    Hyperlipemia     Hypertension     Positive fecal occult blood test     Psoriasis     Rectal bleeding     Visual impairment 1/1950    corrected       Past Surgical History:   Procedure Laterality Date    BASAL CELL CARCINOMA EXCISION      COLONOSCOPY      COLONOSCOPY  03/28/2019    HERNIA REPAIR  2012    Inguinal    LIPOMA RESECTION      Scalp    SIGMOIDOSCOPY         Social History

## 2024-09-16 NOTE — PROGRESS NOTES
"   HISTORY:    ***         ASSESSMENT / PLAN:    ***    {Common ambulatory SmartLinks:38337}    Review of Systems      Objective:     Physical Exam      No results found for: \"PSA\"]  BUN   Date Value Ref Range Status   09/14/2024 23 5 - 25 mg/dL Final   08/07/2021 18 7 - 28 mg/dL Final     Creatinine   Date Value Ref Range Status   09/14/2024 1.27 0.60 - 1.30 mg/dL Final     Comment:     Standardized to IDMS reference method   08/07/2021 1.30 0.53 - 1.30 mg/dL Final     No components found for: \"CBC\"      Patient Active Problem List   Diagnosis   • Rectal bleeding   • CKD (chronic kidney disease) stage 3, GFR 30-59 ml/min (HCC)   • Depression, recurrent (HCC)   • Peripheral vascular disease (HCC)        [facility]       Patient ID: Christian Jones is a 88 y.o. male.      Current Outpatient Medications:   •  acetaminophen (TYLENOL) 650 mg CR tablet, Take 650 mg by mouth every 8 (eight) hours as needed for mild pain For arthritis pain, Disp: , Rfl:   •  alfuzosin (UROXATRAL) 10 mg 24 hr tablet, TAKE 1 TABLET BY MOUTH EVERY DAY, Disp: 30 tablet, Rfl: 0  •  amLODIPine (NORVASC) 10 mg tablet, Take 1 tablet (10 mg total) by mouth daily, Disp: 90 tablet, Rfl: 1  •  Multiple Vitamin (multivitamin) tablet, Take 1 tablet by mouth daily, Disp: , Rfl:     Past Medical History:   Diagnosis Date   • Arthritis 1/2000   • Basal cell carcinoma     Head   • Depression 1/1990   • GERD (gastroesophageal reflux disease) 1/1990   • Hyperlipemia    • Hypertension    • Positive fecal occult blood test    • Psoriasis    • Rectal bleeding    • Visual impairment 1/1950    corrected       Past Surgical History:   Procedure Laterality Date   • BASAL CELL CARCINOMA EXCISION     • COLONOSCOPY     • COLONOSCOPY  03/28/2019   • HERNIA REPAIR  2012    Inguinal   • LIPOMA RESECTION      Scalp   • SIGMOIDOSCOPY         Social History                 "

## 2024-09-16 NOTE — PATIENT INSTRUCTIONS
Take the alfuzosin right after dinner.  If you are comfortable with that, you can try taking it every day after dinner

## 2024-09-18 DIAGNOSIS — N40.1 BPH WITH URINARY OBSTRUCTION: ICD-10-CM

## 2024-09-18 DIAGNOSIS — N13.8 BPH WITH URINARY OBSTRUCTION: ICD-10-CM

## 2024-09-18 RX ORDER — ALFUZOSIN HYDROCHLORIDE 10 MG/1
10 TABLET, EXTENDED RELEASE ORAL DAILY
Qty: 30 TABLET | Refills: 5 | Status: SHIPPED | OUTPATIENT
Start: 2024-09-18

## 2024-09-18 NOTE — TELEPHONE ENCOUNTER
Reason for call:   [x] Refill   [] Prior Auth  [] Other:     Office:   [] PCP/Provider -     [x] Specialty/Provider - Uro    Medication: alfuzosin (UROXATRAL) 10 mg 24 hr tablet     Dose/Frequency: TAKE 1 TABLET BY MOUTH EVERY DAY     Quantity: 30    Pharmacy:  [id number] - [place], [place] - [address]     Does the patient have enough for 3 days?   [x] Yes   [] No - Send as HP to POD

## 2024-10-17 ENCOUNTER — IMMUNIZATIONS (OUTPATIENT)
Dept: INTERNAL MEDICINE CLINIC | Facility: CLINIC | Age: 89
End: 2024-10-17
Payer: MEDICARE

## 2024-10-17 DIAGNOSIS — Z23 ENCOUNTER FOR IMMUNIZATION: Primary | ICD-10-CM

## 2024-10-17 PROCEDURE — 90662 IIV NO PRSV INCREASED AG IM: CPT

## 2024-10-17 PROCEDURE — G0008 ADMIN INFLUENZA VIRUS VAC: HCPCS

## 2024-11-29 DIAGNOSIS — I10 ESSENTIAL HYPERTENSION: ICD-10-CM

## 2024-11-29 RX ORDER — AMLODIPINE BESYLATE 10 MG/1
10 TABLET ORAL DAILY
Qty: 90 TABLET | Refills: 1 | Status: SHIPPED | OUTPATIENT
Start: 2024-11-29

## 2024-12-16 ENCOUNTER — OFFICE VISIT (OUTPATIENT)
Dept: INTERNAL MEDICINE CLINIC | Facility: CLINIC | Age: 89
End: 2024-12-16
Payer: MEDICARE

## 2024-12-16 VITALS
HEIGHT: 64 IN | TEMPERATURE: 98.7 F | WEIGHT: 157.4 LBS | OXYGEN SATURATION: 99 % | DIASTOLIC BLOOD PRESSURE: 73 MMHG | HEART RATE: 73 BPM | SYSTOLIC BLOOD PRESSURE: 137 MMHG | BODY MASS INDEX: 26.87 KG/M2

## 2024-12-16 DIAGNOSIS — Z00.00 MEDICARE ANNUAL WELLNESS VISIT, SUBSEQUENT: ICD-10-CM

## 2024-12-16 DIAGNOSIS — Z12.12 ENCOUNTER FOR COLORECTAL CANCER SCREENING: ICD-10-CM

## 2024-12-16 DIAGNOSIS — R06.02 SHORTNESS OF BREATH ON EXERTION: Primary | ICD-10-CM

## 2024-12-16 DIAGNOSIS — Z12.11 ENCOUNTER FOR COLORECTAL CANCER SCREENING: ICD-10-CM

## 2024-12-16 PROCEDURE — G0439 PPPS, SUBSEQ VISIT: HCPCS | Performed by: INTERNAL MEDICINE

## 2024-12-16 NOTE — PROGRESS NOTES
Name: Christian Jones      : 1935      MRN: [id number]  Encounter Provider: Marvin Meadows MD  Encounter Date: 2024   Encounter department: [facility]    Assessment & Plan  Encounter for colorectal cancer screening         Shortness of breath on exertion    Orders:  •  Ambulatory Referral to Cardiology; Future    Medicare annual wellness visit, subsequent       Preventive health issues were discussed with patient, and age appropriate screening tests were ordered as noted in patient's After Visit Summary. Personalized health advice and appropriate referrals for health education or preventive services given if needed, as noted in patient's After Visit Summary.    History of Present Illness     Shortness of Breath  Pertinent negatives include no chest pain, coughing, palpitations or wheezing.      Patient Care Team:  Marvin Meadows MD as PCP - General (Internal Medicine)  W Yan Noyola MD (Colon and Rectal Surgery)    Review of Systems   Constitutional: Negative.    HENT:  Negative for dental problem, drooling, ear discharge and ear pain.    Eyes:  Negative for discharge, redness and itching.   Respiratory:  Negative for apnea, cough and wheezing.    Cardiovascular:  Negative for chest pain and palpitations.   Gastrointestinal:  Negative for abdominal pain, blood in stool, diarrhea and vomiting.   Endocrine: Negative for polydipsia, polyphagia and polyuria.   Genitourinary:  Negative for decreased urine volume, dysuria and frequency.   Musculoskeletal:  Negative for arthralgias, myalgias and neck stiffness.   Skin:  Negative for pallor and wound.   Allergic/Immunologic: Negative for environmental allergies and food allergies.   Neurological:  Negative for facial asymmetry, light-headedness, numbness and headaches.   Hematological:  Negative for adenopathy. Does not bruise/bleed easily.   Psychiatric/Behavioral:  Negative for agitation,  behavioral problems and confusion.      Medical History Reviewed by provider this encounter:  Tobacco  Allergies  Meds  Problems  Med Hx  Surg Hx  Fam Hx       Annual Wellness Visit Questionnaire   Christian is here for his Subsequent Wellness visit.     Health Risk Assessment:   Patient rates overall health as very good. Patient feels that their physical health rating is same. Patient is very satisfied with their life. Eyesight was rated as same. Hearing was rated as slightly worse. Patient feels that their emotional and mental health rating is same. Patients states they are never, rarely angry. Patient states they are sometimes unusually tired/fatigued. Pain experienced in the last 7 days has been none. Patient states that he has experienced no weight loss or gain in last 6 months.     Depression Screening:   PHQ-9 Score: 0      Fall Risk Screening:   In the past year, patient has experienced: no history of falling in past year      Home Safety:  Patient does not have trouble with stairs inside or outside of their home. Patient has working smoke alarms and has working carbon monoxide detector. Home safety hazards include: none.     Nutrition:   Current diet is No Added Salt.     Medications:   Patient is not currently taking any over-the-counter supplements. Patient is able to manage medications.     Activities of Daily Living (ADLs)/Instrumental Activities of Daily Living (IADLs):   Walk and transfer into and out of bed and chair?: Yes  Dress and groom yourself?: Yes    Bathe or shower yourself?: Yes    Feed yourself? Yes  Do your laundry/housekeeping?: Yes  Manage your money, pay your bills and track your expenses?: Yes  Make your own meals?: Yes    Do your own shopping?: Yes    Previous Hospitalizations:   Any hospitalizations or ED visits within the last 12 months?: No      Advance Care Planning:   Living will: Yes    Durable POA for healthcare: Yes    Advanced directive: Yes      PREVENTIVE SCREENINGS       Cardiovascular Screening:    General: Screening Current      Diabetes Screening:     General: Screening Current      Colorectal Cancer Screening:     General: Screening Not Indicated      Prostate Cancer Screening:    General: Screening Not Indicated      Abdominal Aortic Aneurysm (AAA) Screening:    Risk factors include: tobacco use        Lung Cancer Screening:     General: Screening Not Indicated    Screening, Brief Intervention, and Referral to Treatment (SBIRT)    Screening  Typical number of drinks in a day: 1  Typical number of drinks in a week: 5  Interpretation: Low risk drinking behavior.    AUDIT-C Screenin) How often did you have a drink containing alcohol in the past year? 4 or more times a week  2) How many drinks did you have on a typical day when you were drinking in the past year? 1 to 2  3) How often did you have 6 or more drinks on one occasion in the past year? never    AUDIT-C Score: 4  Interpretation: Score 4-12 (male): POSITIVE screen for alcohol misuse    AUDIT Screenin) How often during the last year have you found that you were not able to stop drinking once you had started? 0 - never  5) How often during the last year have you failed to do what was normally expected from you because of drinking? 0 - never  6) How often during the last year have you needed a first drink in the morning to get yourself going after a heavy drinking session? 0 - never  7) How often during the last year have you had a feeling of guilt or remorse after drinking? 0 - never  8) How often during the last year have you been unable to remember what happened the night before because you had been drinking? 0 - never  9) Have you or someone else been injured as a result of your drinking? 0 - no  10) Has a relative or friend or a doctor or another health worker been concerned about your drinking or suggested you cut down? 0 - no    AUDIT Score: 4  Interpretation: Low risk alcohol consumption    Single Item Drug  "Screening:  How often have you used an illegal drug (including marijuana) or a prescription medication for non-medical reasons in the past year? never    Single Item Drug Screen Score: 0  Interpretation: Negative screen for possible drug use disorder    Social Drivers of Health     Financial Resource Strain: Low Risk  (12/4/2023)    Overall Financial Resource Strain (CARDIA)    • Difficulty of Paying Living Expenses: Not hard at all   Food Insecurity: No Food Insecurity (12/16/2024)    Hunger Vital Sign    • Worried About Running Out of Food in the Last Year: Never true    • Ran Out of Food in the Last Year: Never true   Transportation Needs: No Transportation Needs (12/16/2024)    PRAPARE - Transportation    • Lack of Transportation (Medical): No    • Lack of Transportation (Non-Medical): No   Housing Stability: Low Risk  (12/16/2024)    Housing Stability Vital Sign    • Unable to Pay for Housing in the Last Year: No    • Number of Times Moved in the Last Year: 0    • Homeless in the Last Year: No   Utilities: Not At Risk (12/16/2024)    [facility] Utilities    • Threatened with loss of utilities: No     No results found.    Objective   /73 (BP Location: Left arm, Patient Position: Sitting, Cuff Size: Adult)   Pulse 73   Temp 98.7 °F (37.1 °C) (Temporal)   Ht 5' 4\" (1.626 m)   Wt 71.4 kg (157 lb 6.4 oz)   SpO2 99%   BMI 27.02 kg/m²     Physical Exam  Vitals and nursing note reviewed.   Constitutional:       General: He is not in acute distress.     Appearance: He is well-developed.   HENT:      Head: Normocephalic and atraumatic.   Eyes:      Conjunctiva/sclera: Conjunctivae normal.   Cardiovascular:      Rate and Rhythm: Normal rate and regular rhythm.      Heart sounds: No murmur heard.  Pulmonary:      Effort: Pulmonary effort is normal. No respiratory distress.      Breath sounds: Normal breath sounds.   Abdominal:      Palpations: Abdomen is soft.      Tenderness: There is no abdominal tenderness. "   Musculoskeletal:         General: No swelling.      Cervical back: Neck supple.   Skin:     General: Skin is warm and dry.      Capillary Refill: Capillary refill takes less than 2 seconds.   Neurological:      Mental Status: He is alert.   Psychiatric:         Mood and Affect: Mood normal.     He  was referred  to  see Cardiologist  for  shortness of  breath on exertion   and  some blood work.    Fup  4 months.     Marvin Meadows MD.FACP

## 2024-12-16 NOTE — PATIENT INSTRUCTIONS
Medicare Preventive Visit Patient Instructions  Thank you for completing your Welcome to Medicare Visit or Medicare Annual Wellness Visit today. Your next wellness visit will be due in one year (12/17/2025).  The screening/preventive services that you may require over the next 5-10 years are detailed below. Some tests may not apply to you based off risk factors and/or age. Screening tests ordered at today's visit but not completed yet may show as past due. Also, please note that scanned in results may not display below.  Preventive Screenings:  Service Recommendations Previous Testing/Comments   Colorectal Cancer Screening  Colonoscopy    Fecal Occult Blood Test (FOBT)/Fecal Immunochemical Test (FIT)  Fecal DNA/Cologuard Test  Flexible Sigmoidoscopy Age: 45-75 years old   Colonoscopy: every 10 years (May be performed more frequently if at higher risk)  OR  FOBT/FIT: every 1 year  OR  Cologuard: every 3 years  OR  Sigmoidoscopy: every 5 years  Screening may be recommended earlier than age 45 if at higher risk for colorectal cancer. Also, an individualized decision between you and your healthcare provider will decide whether screening between the ages of 76-85 would be appropriate. Colonoscopy: 03/28/2019  FOBT/FIT: Not on file  Cologuard: Not on file  Sigmoidoscopy: Not on file    Screening Not Indicated     Prostate Cancer Screening Individualized decision between patient and health care provider in men between ages of 55-69   Medicare will cover every 12 months beginning on the day after your 50th birthday PSA: No results in last 5 years     Screening Not Indicated     Hepatitis C Screening Once for adults born between 1945 and 1965  More frequently in patients at high risk for Hepatitis C Hep C Antibody: Not on file        Diabetes Screening 1-2 times per year if you're at risk for diabetes or have pre-diabetes Fasting glucose: 96 mg/dL (9/14/2024)  A1C: No results in last 5 years (No results in last 5  years)  Screening Current   Cholesterol Screening Once every 5 years if you don't have a lipid disorder. May order more often based on risk factors. Lipid panel: 09/14/2024  Screening Current      Other Preventive Screenings Covered by Medicare:  Abdominal Aortic Aneurysm (AAA) Screening: covered once if your at risk. You're considered to be at risk if you have a family history of AAA or a male between the age of 65-75 who smoking at least 100 cigarettes in your lifetime.  Lung Cancer Screening: covers low dose CT scan once per year if you meet all of the following conditions: (1) Age 55-77; (2) No signs or symptoms of lung cancer; (3) Current smoker or have quit smoking within the last 15 years; (4) You have a tobacco smoking history of at least 20 pack years (packs per day x number of years you smoked); (5) You get a written order from a healthcare provider.  Glaucoma Screening: covered annually if you're considered high risk: (1) You have diabetes OR (2) Family history of glaucoma OR (3)  aged 50 and older OR (4)  American aged 65 and older  Osteoporosis Screening: covered every 2 years if you meet one of the following conditions: (1) Have a vertebral abnormality; (2) On glucocorticoid therapy for more than 3 months; (3) Have primary hyperparathyroidism; (4) On osteoporosis medications and need to assess response to drug therapy.  HIV Screening: covered annually if you're between the age of 15-65. Also covered annually if you are younger than 15 and older than 65 with risk factors for HIV infection. For pregnant patients, it is covered up to 3 times per pregnancy.    Immunizations:  Immunization Recommendations   Influenza Vaccine Annual influenza vaccination during flu season is recommended for all persons aged >= 6 months who do not have contraindications   Pneumococcal Vaccine   * Pneumococcal conjugate vaccine = PCV13 (Prevnar 13), PCV15 (Vaxneuvance), PCV20 (Prevnar 20)  *  Pneumococcal polysaccharide vaccine = PPSV23 (Pneumovax) Adults 19-65 yo with certain risk factors or if 65+ yo  If never received any pneumonia vaccine: recommend Prevnar 20 (PCV20)  Give PCV20 if previously received 1 dose of PCV13 or PPSV23   Hepatitis B Vaccine 3 dose series if at intermediate or high risk (ex: diabetes, end stage renal disease, liver disease)   Respiratory syncytial virus (RSV) Vaccine - COVERED BY MEDICARE PART D  * RSVPreF3 (Arexvy) CDC recommends that adults 60 years of age and older may receive a single dose of RSV vaccine using shared clinical decision-making (SCDM)   Tetanus (Td) Vaccine - COST NOT COVERED BY MEDICARE PART B Following completion of primary series, a booster dose should be given every 10 years to maintain immunity against tetanus. Td may also be given as tetanus wound prophylaxis.   Tdap Vaccine - COST NOT COVERED BY MEDICARE PART B Recommended at least once for all adults. For pregnant patients, recommended with each pregnancy.   Shingles Vaccine (Shingrix) - COST NOT COVERED BY MEDICARE PART B  2 shot series recommended in those 19 years and older who have or will have weakened immune systems or those 50 years and older     Health Maintenance Due:      Topic Date Due   • Colorectal Cancer Screening  03/28/2024     Immunizations Due:      Topic Date Due   • COVID-19 Vaccine (7 - 2024-25 season) 09/01/2024     Advance Directives   What are advance directives?  Advance directives are legal documents that state your wishes and plans for medical care. These plans are made ahead of time in case you lose your ability to make decisions for yourself. Advance directives can apply to any medical decision, such as the treatments you want, and if you want to donate organs.   What are the types of advance directives?  There are many types of advance directives, and each state has rules about how to use them. You may choose a combination of any of the following:  Living will:  This is  a written record of the treatment you want. You can also choose which treatments you do not want, which to limit, and which to stop at a certain time. This includes surgery, medicine, IV fluid, and tube feedings.   Durable power of  for healthcare (DPAHC):  This is a written record that states who you want to make healthcare choices for you when you are unable to make them for yourself. This person, called a proxy, is usually a family member or a friend. You may choose more than 1 proxy.  Do not resuscitate (DNR) order:  A DNR order is used in case your heart stops beating or you stop breathing. It is a request not to have certain forms of treatment, such as CPR. A DNR order may be included in other types of advance directives.  Medical directive:  This covers the care that you want if you are in a coma, near death, or unable to make decisions for yourself. You can list the treatments you want for each condition. Treatment may include pain medicine, surgery, blood transfusions, dialysis, IV or tube feedings, and a ventilator (breathing machine).  Values history:  This document has questions about your views, beliefs, and how you feel and think about life. This information can help others choose the care that you would choose.  Why are advance directives important?  An advance directive helps you control your care. Although spoken wishes may be used, it is better to have your wishes written down. Spoken wishes can be misunderstood, or not followed. Treatments may be given even if you do not want them. An advance directive may make it easier for your family to make difficult choices about your care.   Weight Management   Why it is important to manage your weight:  Being overweight increases your risk of health conditions such as heart disease, high blood pressure, type 2 diabetes, and certain types of cancer. It can also increase your risk for osteoarthritis, sleep apnea, and other respiratory problems. Aim  for a slow, steady weight loss. Even a small amount of weight loss can lower your risk of health problems.  How to lose weight safely:  A safe and healthy way to lose weight is to eat fewer calories and get regular exercise. You can lose up about 1 pound a week by decreasing the number of calories you eat by 500 calories each day.   Healthy meal plan for weight management:  A healthy meal plan includes a variety of foods, contains fewer calories, and helps you stay healthy. A healthy meal plan includes the following:  Eat whole-grain foods more often.  A healthy meal plan should contain fiber. Fiber is the part of grains, fruits, and vegetables that is not broken down by your body. Whole-grain foods are healthy and provide extra fiber in your diet. Some examples of whole-grain foods are whole-wheat breads and pastas, oatmeal, brown rice, and bulgur.  Eat a variety of vegetables every day.  Include dark, leafy greens such as spinach, kale, ok greens, and mustard greens. Eat yellow and orange vegetables such as carrots, sweet potatoes, and winter squash.   Eat a variety of fruits every day.  Choose fresh or canned fruit (canned in its own juice or light syrup) instead of juice. Fruit juice has very little or no fiber.  Eat low-fat dairy foods.  Drink fat-free (skim) milk or 1% milk. Eat fat-free yogurt and low-fat cottage cheese. Try low-fat cheeses such as mozzarella and other reduced-fat cheeses.  Choose meat and other protein foods that are low in fat.  Choose beans or other legumes such as split peas or lentils. Choose fish, skinless poultry (chicken or turkey), or lean cuts of red meat (beef or pork). Before you cook meat or poultry, cut off any visible fat.   Use less fat and oil.  Try baking foods instead of frying them. Add less fat, such as margarine, sour cream, regular salad dressing and mayonnaise to foods. Eat fewer high-fat foods. Some examples of high-fat foods include french fries, doughnuts, ice  "cream, and cakes.  Eat fewer sweets.  Limit foods and drinks that are high in sugar. This includes candy, cookies, regular soda, and sweetened drinks.  Exercise:  Exercise at least 30 minutes per day on most days of the week. Some examples of exercise include walking, biking, dancing, and swimming. You can also fit in more physical activity by taking the stairs instead of the elevator or parking farther away from stores. Ask your healthcare provider about the best exercise plan for you.   Alcohol Use and Your Health    Drinking too much can harm your health.  Excessive alcohol use leads to about 88,000 death in the [place] each year, and shortens the life of those who diet by almost 30 years.  Further, excessive drinking cost the economy $249 billion in 2010.  Most excessive drinkers are not alcohol dependent.    Excessive alcohol use has immediate effects that increase the risk of many harmful health conditions.  These are most often the result of binge drinking.  Over time, excessive alcohol use can lead to the development of chronic diseases and other series health problems.    What is considered a \"drink\"?        Excessive alcohol use includes:  Binge Drinking: For women, 4 or more drinks consumed on one occasion. For men, 5 or more drinks consumed on one occasion.  Heavy Drinking: For women, 8 or more drinks per week. For men, 15 or more drinks per week  Any alcohol used by pregnant women  Any alcohol used by those under the age of 21 years    If you choose to drink, do so in moderation:  Do not drink at all if you are under the age of 21, or if you are or may be pregnant, or have health problems that could be made worse by drinking.  For women, up to 1 drink per day  For men, up to 2 drinks a day    No one should begin drinking or drink more frequently based on potential health benefits    Short-Term Health Risks:  Injuries: motor vehicle crashes, falls, drownings, burns  Violence: homicide, suicide, " sexual assault, intimate partner violence  Alcohol poisoning  Reproductive health: risky sexual behaviors, unintended prengnacy, sexually transmitted diseases, miscarriage, stillbirth, fetal alcohol syndrome    Long-Term Health Risks:  Chronic diseases: high blood pressure, heart disease, stroke, liver disease, digestive problems  Cancers: breast, mouth and throat, liver, colon  Learning and memory problems: dementia, poor school performance  Mental health: depression, anxiety, insomnia  Social problems: lost productivity, family problems, unemployment  Alcohol dependence    For support and more information:  Substance Abuse and Mental Health Services Administration  [address]  [place] [address]  Web Address: http://www.[facility].gov    Alcoholics Anonymous        Web Address: http://www.aa.org    https://www.cdc.gov/alcohol/fact-sheets/alcohol-use.htm     © Copyright [employer] 2018 Information is for End User's use only and may not be sold, redistributed or otherwise used for commercial purposes. All illustrations and images included in CareNotes® are the copyrighted property of [employer].A[employer]M., Inc. or [employer]

## 2025-01-20 PROBLEM — I35.0 MODERATE AORTIC STENOSIS: Status: ACTIVE | Noted: 2025-01-20

## 2025-01-20 PROBLEM — R06.09 DYSPNEA ON EXERTION: Status: ACTIVE | Noted: 2025-01-20

## 2025-01-20 NOTE — ASSESSMENT & PLAN NOTE
Lab Results   Component Value Date    EGFR 50 09/14/2024    EGFR 54 12/06/2023    EGFR 40 06/06/2023    CREATININE 1.27 09/14/2024    CREATININE 1.20 12/06/2023    CREATININE 1.54 (H) 06/06/2023

## 2025-01-20 NOTE — ASSESSMENT & PLAN NOTE
12/13/2023 echo: Normal LVEF 60%.  Grade 1 diastolic dysfunction.  Mild LAE.  Moderate aortic stenosis with peak velocity 2.1, mean gradient 11 mmHg, aortic valve area 1.2 cm², stroke-volume index 30.50 mL/M squared mildly dilated aortic root at 3.7 cm

## 2025-01-24 ENCOUNTER — CONSULT (OUTPATIENT)
Dept: CARDIOLOGY CLINIC | Facility: CLINIC | Age: 89
End: 2025-01-24
Payer: COMMERCIAL

## 2025-01-24 VITALS
WEIGHT: 189 LBS | HEART RATE: 82 BPM | DIASTOLIC BLOOD PRESSURE: 62 MMHG | SYSTOLIC BLOOD PRESSURE: 128 MMHG | BODY MASS INDEX: 32.44 KG/M2

## 2025-01-24 DIAGNOSIS — N18.31 STAGE 3A CHRONIC KIDNEY DISEASE (HCC): ICD-10-CM

## 2025-01-24 DIAGNOSIS — R06.09 DYSPNEA ON EXERTION: Primary | ICD-10-CM

## 2025-01-24 DIAGNOSIS — R06.02 SHORTNESS OF BREATH ON EXERTION: ICD-10-CM

## 2025-01-24 DIAGNOSIS — I35.0 MODERATE AORTIC STENOSIS: ICD-10-CM

## 2025-01-24 PROCEDURE — 93000 ELECTROCARDIOGRAM COMPLETE: CPT | Performed by: INTERNAL MEDICINE

## 2025-01-24 PROCEDURE — 99204 OFFICE O/P NEW MOD 45 MIN: CPT | Performed by: INTERNAL MEDICINE

## 2025-01-24 NOTE — PROGRESS NOTES
[facility]  [address] [place] [address]  Phone#  [phone]   Fax#  [phone]  *-*-*-*-*-*-*-*-*-*-*-*-*-*-*-*-*-*-*-*-*-*-*-*-*-*-*-*-*-*-*-*-*-*-*-*-*-*-*-*-*-*-*-*-*-*-*-*-*-*-*-*-*-*                                              Cardiology Consultation       ENCOUNTER DATE: 25 9:26 AM  PATIENT NAME: Christian Jones   : 1935    MRN: [id number]  AGE:89 y.o.      SEX: male  ENCOUNTER PROVIDER:Michael Maier MD     PRIMARY CARE PHYSICIAN: Marvin Meadows MD    CARDIOLOGY SPECIALTY COMMENTS  Referred to cardiology by  Marvin Meadows MD for dyspnea on exertion    9/3/2021 echo: LVEF 55% grade 1 diastolic dysfunction.  Mild MR.  Mild aortic stenosis with mean gradient 10 mmHg.  Mild TR with normal PASP    2023 echo: Normal LVEF 60%.  Grade 1 diastolic dysfunction.  Mild LAE.  Moderate aortic stenosis with peak velocity 2.1, mean gradient 11 mmHg, aortic valve area 1.2 cm², stroke-volume index 30.50 mL/M squared mildly dilated aortic root at 3.7 cm    ACTIVE DIAGNOSIS AND PLAN    1. Dyspnea on exertion  -     Echo complete w/ contrast if indicated; Future; Expected date: 2025  -     Holter monitor; Future; Expected date: 2025  2. Moderate low-flow aortic stenosis  Assessment & Plan:  2023 echo: Normal LVEF 60%.  Grade 1 diastolic dysfunction.  Mild LAE.  Moderate aortic stenosis with peak velocity 2.1, mean gradient 11 mmHg, aortic valve area 1.2 cm², stroke-volume index 30.50 mL/M squared mildly dilated aortic root at 3.7 cm  Orders:  -     POCT ECG  3. Stage 3a chronic kidney disease (HCC)  Assessment & Plan:  Lab Results   Component Value Date    EGFR 50 2024    EGFR 54 2023    EGFR 40 2023    CREATININE 1.27 2024    CREATININE 1.20 2023    CREATININE 1.54 (H) 2023     4. Shortness of breath on exertion  -     Ambulatory Referral to Cardiology     HPI:    Patient is an 89-year-old male who  presents for dyspnea on exertion of at least 5 years duration which is becoming more severe.  He presently complains that he becomes short of breath if he goes up 3 flights of stairs or if he is walking up a particularly steep hill.  He states that he did not use to get short of breath and was able to do these activities.  He denies all other cardiac symptoms. He denies chest discomfort.  He has no palpitations.  He denies symptoms of dizziness, lightheadedness or near-syncope/syncope.  He denies leg edema.  He denies symptoms of orthopnea or paroxysmal nocturnal dyspnea.    He has known moderate aortic stenosis and on ECG today he has frequent supraventricular conducted complexes with aberrancy versus frequent PVCs.  The PVCs could be decreasing his cardiac output and making him short of breath.  His last echocardiogram is over a year ago and he may have had further progression of his aortic stenosis.  Lastly, he is describing somewhat strenuous activity and that his age that may not be feasible.  His symptoms may improve with exercise.  The patient states that he does not thing except general activities around the house.    DISCUSSION/PLAN:       Echocardiogram  24-hour Holter monitor  Begin an exercise plan with walking initially 10 minutes somewhat briskly but not enough to develop significant shortness of breath.  Gradually increase the amount of walking with a goal of eventually doing 30 minutes.  Do walking exercise 5 days a week.  However, only do it outside when the temperature is greater than 40 and it is not windy.  For now, he could go to [employer] and do some walking in the mall  Return in 6 to 8 weeks    ACTIVE PROBLEM LIST  Patient Active Problem List   Diagnosis    Rectal bleeding    CKD (chronic kidney disease) stage 3, GFR 30-59 ml/min (HCC)    Depression, recurrent (HCC)    Peripheral vascular disease (HCC)    Moderate low-flow aortic stenosis    Dyspnea on exertion       TODAY'S  EKG  Results for orders placed or performed in visit on 01/24/25   POCT ECG    Narrative    Sinus rhythm with frequent premature atrial contractions with aberrancy versus premature ventricular contractions.  Nonspecific ST-T wave abnormality.  Abnormal EKG.         Lab Studies:    Lab Results   Component Value Date    CHOLESTEROL 200 (H) 09/14/2024    CHOLESTEROL 192 12/06/2023    CHOLESTEROL 211 (H) 02/07/2023     Lab Results   Component Value Date    TRIG 133 09/14/2024    TRIG 108 12/06/2023    TRIG 138 02/07/2023     Lab Results   Component Value Date    HDL 50 09/14/2024    HDL 54 12/06/2023    HDL 50 02/07/2023     Lab Results   Component Value Date    LDLCALC 123 (H) 09/14/2024    LDLCALC 116 (H) 12/06/2023    LDLCALC 133 (H) 02/07/2023       Lab Results   Component Value Date    EGFR 50 09/14/2024    EGFR 54 12/06/2023    EGFR 40 06/06/2023    SODIUM 139 09/14/2024    SODIUM 141 12/06/2023    SODIUM 135 06/06/2023    K 4.3 09/14/2024    K 3.9 12/06/2023    K 3.8 06/06/2023     (H) 09/14/2024     (H) 12/06/2023     (H) 06/06/2023    CO2 22 09/14/2024    CO2 22 12/06/2023    CO2 21 06/06/2023    BUN 23 09/14/2024    BUN 20 12/06/2023    BUN 21 06/06/2023    CREATININE 1.27 09/14/2024    CREATININE 1.20 12/06/2023    CREATININE 1.54 (H) 06/06/2023     Lab Results   Component Value Date    WBC 8.20 08/16/2022    WBC 10.02 02/08/2022    WBC 9.10 03/18/2019    HGB 14.9 08/16/2022    HGB 14.0 02/08/2022    HGB 13.9 03/18/2019    HCT 47.2 08/16/2022    HCT 43.6 02/08/2022    HCT 41.6 03/18/2019    MCV 90 08/16/2022    MCV 90 02/08/2022    MCV 90 03/18/2019    MCH 28.4 08/16/2022    MCH 29.0 02/08/2022    MCH 30.0 03/18/2019    MCHC 31.6 08/16/2022    MCHC 32.1 02/08/2022    MCHC 33.4 03/18/2019     08/16/2022     02/08/2022     03/18/2019        Lab Results   Component Value Date    CALCIUM 9.0 09/14/2024    CALCIUM 9.6 12/06/2023    CALCIUM 8.7 06/06/2023    AST 15 09/14/2024     AST 17 2023    AST 24 2023    ALT 15 2024    ALT 17 2023    ALT 36 2023    ALKPHOS 71 2024    ALKPHOS 70 2023    ALKPHOS 89 2023         Current Outpatient Medications:     acetaminophen (TYLENOL) 650 mg CR tablet, Take 650 mg by mouth every 8 (eight) hours as needed for mild pain For arthritis pain, Disp: , Rfl:     alfuzosin (UROXATRAL) 10 mg 24 hr tablet, Take 1 tablet (10 mg total) by mouth daily, Disp: 30 tablet, Rfl: 5    amLODIPine (NORVASC) 10 mg tablet, TAKE 1 TABLET BY MOUTH EVERY DAY, Disp: 90 tablet, Rfl: 1    Multiple Vitamin (multivitamin) tablet, Take 1 tablet by mouth daily, Disp: , Rfl:   No Known Allergies    Past Medical History:   Diagnosis Date    Arthritis 2000    Basal cell carcinoma     Head    Depression 1990    GERD (gastroesophageal reflux disease) 1990    Hyperlipemia     Hypertension     Positive fecal occult blood test     Psoriasis     Rectal bleeding     Visual impairment 1950    corrected     Social History     Socioeconomic History    Marital status: /Civil Union     Spouse name: Not on file    Number of children: Not on file    Years of education: Not on file    Highest education level: Not on file   Occupational History    Not on file   Tobacco Use    Smoking status: Former     Current packs/day: 0.00     Average packs/day: 0.3 packs/day for 15.0 years (3.8 ttl pk-yrs)     Types: Pipe, Cigarettes     Start date:      Quit date: 1985     Years since quittin.6    Smokeless tobacco: Never   Vaping Use    Vaping status: Never Used   Substance and Sexual Activity    Alcohol use: Yes     Alcohol/week: 13.0 standard drinks of alcohol     Types: 7 Glasses of wine, 6 Cans of beer per week     Comment: drink with meals    Drug use: Never    Sexual activity: Not Currently     Partners: Female     Birth control/protection: Condom Male   Other Topics Concern    Not on file   Social History Narrative    Marital  Status:      As per [employer]     Social Drivers of Health     Financial Resource Strain: Low Risk  (12/4/2023)    Overall Financial Resource Strain (CARDIA)     Difficulty of Paying Living Expenses: Not hard at all   Food Insecurity: No Food Insecurity (12/16/2024)    Hunger Vital Sign     Worried About Running Out of Food in the Last Year: Never true     Ran Out of Food in the Last Year: Never true   Transportation Needs: No Transportation Needs (12/16/2024)    PRAPARE - Transportation     Lack of Transportation (Medical): No     Lack of Transportation (Non-Medical): No   Physical Activity: Not on file   Stress: Not on file   Social Connections: Not on file   Intimate Partner Violence: Not on file   Housing Stability: Low Risk  (12/16/2024)    Housing Stability Vital Sign     Unable to Pay for Housing in the Last Year: No     Number of Times Moved in the Last Year: 0     Homeless in the Last Year: No      Family History   Problem Relation Age of Onset    Hypertension Father     Kidney disease Father     Thrombosis Mother         cause of death     Past Surgical History:   Procedure Laterality Date    BASAL CELL CARCINOMA EXCISION      COLONOSCOPY      COLONOSCOPY  03/28/2019    HERNIA REPAIR  2012    Inguinal    LIPOMA RESECTION      Scalp    SIGMOIDOSCOPY         PREVIOUS WEIGHTS:   Wt Readings from Last 10 Encounters:   01/24/25 85.7 kg (189 lb)   12/16/24 71.4 kg (157 lb 6.4 oz)   09/16/24 69.9 kg (154 lb)   09/13/24 68.9 kg (152 lb)   04/17/24 72.1 kg (159 lb)   12/13/23 71.7 kg (158 lb)   12/11/23 71.7 kg (158 lb)   07/19/23 69.9 kg (154 lb)   07/13/23 69.9 kg (154 lb)   06/12/23 68 kg (150 lb)        Review of Systems:  Review of Systems   Constitutional: Negative.    HENT: Negative.     Eyes: Negative.    Respiratory:  Positive for shortness of breath. Negative for cough, choking, chest tightness and wheezing.    Cardiovascular:  Negative for chest pain, palpitations and leg swelling.    Gastrointestinal: Negative.    Endocrine: Negative.    Musculoskeletal: Negative.    Skin: Negative.    Allergic/Immunologic: Negative.    Neurological: Negative.    Hematological: Negative.    Psychiatric/Behavioral: Negative.         Physical Exam:  /62 (BP Location: Right arm, Patient Position: Sitting, Cuff Size: Adult)   Pulse 82   Wt 85.7 kg (189 lb)   BMI 32.44 kg/m²     Physical Exam  Vitals reviewed.   Constitutional:       General: He is not in acute distress.     Appearance: He is well-developed.   HENT:      Head: Normocephalic and atraumatic.   Neck:      Thyroid: No thyromegaly.      Vascular: No carotid bruit or JVD.      Trachea: No tracheal deviation.   Cardiovascular:      Rate and Rhythm: Normal rate and regular rhythm.      Pulses: Normal pulses.      Heart sounds: Murmur heard.      Crescendo decrescendo systolic murmur is present with a grade of 2/6.      No friction rub. No gallop.   Pulmonary:      Effort: Pulmonary effort is normal. No respiratory distress.      Breath sounds: Normal breath sounds. No wheezing, rhonchi or rales.   Chest:      Chest wall: No tenderness.   Abdominal:      General: Bowel sounds are normal. There is no distension.      Palpations: Abdomen is soft.      Tenderness: There is no abdominal tenderness.   Musculoskeletal:      Cervical back: Normal range of motion and neck supple.      Right lower leg: No edema.      Left lower leg: No edema.   Skin:     General: Skin is warm and dry.   Neurological:      General: No focal deficit present.      Mental Status: He is alert and oriented to person, place, and time.      Gait: Gait normal.   Psychiatric:         Mood and Affect: Mood normal.         Behavior: Behavior normal.         Thought Content: Thought content normal.         Judgment: Judgment normal.         ======================================================   Results Review Statement: No pertinent imaging studies reviewed.    I spent 48 minutes on  "the patient's office visit. This time was spent on the day of the visit. I had direct contact with the patient in the office on the day of the visit. Greater than 50% of the total time was spent obtaining a history, examining patient, answering all patient questions, arranging and discussing plan of care with patient as well as directly providing instructions.  Additional time then spent on orders and office chart.      Portions of the record may have been created with voice recognition software. Occasional wrong word or \"sound a like\" substitutions may have occurred due to the inherent limitations of voice recognition software. Read the chart carefully and recognize, using context, where substitutions have occurred.    SIGNATURES:   Michael Maier MD   "

## 2025-02-13 ENCOUNTER — HOSPITAL ENCOUNTER (OUTPATIENT)
Dept: NON INVASIVE DIAGNOSTICS | Facility: HOSPITAL | Age: 89
Discharge: HOME | End: 2025-02-13
Attending: INTERNAL MEDICINE
Payer: COMMERCIAL

## 2025-02-13 VITALS
HEIGHT: 64 IN | WEIGHT: 189 LBS | DIASTOLIC BLOOD PRESSURE: 62 MMHG | SYSTOLIC BLOOD PRESSURE: 128 MMHG | HEART RATE: 82 BPM | BODY MASS INDEX: 32.27 KG/M2

## 2025-02-13 DIAGNOSIS — R06.09 DYSPNEA ON EXERTION: ICD-10-CM

## 2025-02-13 LAB
AORTIC ROOT: 3.2 CM
AORTIC VALVE MEAN VELOCITY: 20.8 M/S
ASCENDING AORTA: 3.5 CM
AV AREA BY CONTINUOUS VTI: 0.9 CM2
AV AREA PEAK VELOCITY: 0.8 CM2
AV LVOT MEAN GRADIENT: 1 MMHG
AV LVOT PEAK GRADIENT: 2 MMHG
AV MEAN PRESS GRAD SYS DOP V1V2: 19 MMHG
AV ORIFICE AREA US: 0.86 CM2
AV PEAK GRADIENT: 29 MMHG
AV VELOCITY RATIO: 0.3
AV VMAX SYS DOP: 2.7 M/S
BSA FOR ECHO PROCEDURE: 1.91 M2
DOP CALC AO VTI: 60.74 CM
DOP CALC LVOT AREA: 2.83 CM2
DOP CALC LVOT CARDIAC INDEX: 1.99 L/MIN/M2
DOP CALC LVOT CARDIAC OUTPUT: 3.8 L/MIN
DOP CALC LVOT DIAMETER: 1.9 CM
DOP CALC LVOT PEAK VEL VTI: 18.48 CM
DOP CALC LVOT PEAK VEL: 0.78 M/S
DOP CALC LVOT STROKE INDEX: 27.2 ML/M2
DOP CALC LVOT STROKE VOLUME: 52.37
E WAVE DECELERATION TIME: 233 MS
E/A RATIO: 0.76
FRACTIONAL SHORTENING: 29 (ref 28–44)
INTERVENTRICULAR SEPTUM IN DIASTOLE (PARASTERNAL SHORT AXIS VIEW): 1.5 CM
INTERVENTRICULAR SEPTUM: 1.5 CM (ref 0.6–1.1)
LAAS-AP2: 24.7 CM2
LAAS-AP4: 26.6 CM2
LEFT ATRIUM SIZE: 4.1 CM
LEFT ATRIUM VOLUME (MOD BIPLANE): 90 ML
LEFT ATRIUM VOLUME INDEX (MOD BIPLANE): 47.1 ML/M2
LEFT INTERNAL DIMENSION IN SYSTOLE: 3 CM (ref 2.1–4)
LEFT VENTRICULAR INTERNAL DIMENSION IN DIASTOLE: 4.2 CM (ref 3.5–6)
LEFT VENTRICULAR POSTERIOR WALL IN END DIASTOLE: 0.9 CM
LEFT VENTRICULAR STROKE VOLUME: 46 ML
LV EF US.2D.A4C+ESTIMATED: 56 %
LVSV (TEICH): 46 ML
MV E'TISSUE VEL-LAT: 7 CM/S
MV E'TISSUE VEL-SEP: 7 CM/S
MV PEAK A VEL: 1.08 M/S
MV PEAK E VEL: 82 CM/S
MV STENOSIS PRESSURE HALF TIME: 68 MS
MV VALVE AREA P 1/2 METHOD: 3.24
RIGHT ATRIUM AREA SYSTOLE A4C: 19.1 CM2
RIGHT VENTRICLE ID DIMENSION: 3.4 CM
SL CV LEFT ATRIUM LENGTH A2C: 5.8 CM
SL CV LV EF: 55
SL CV PED ECHO LEFT VENTRICLE DIASTOLIC VOLUME (MOD BIPLANE) 2D: 80 ML
SL CV PED ECHO LEFT VENTRICLE SYSTOLIC VOLUME (MOD BIPLANE) 2D: 34 ML
TR MAX PG: 22 MMHG
TR PEAK VELOCITY: 2.4 M/S
TRICUSPID ANNULAR PLANE SYSTOLIC EXCURSION: 2.1 CM
TRICUSPID VALVE PEAK REGURGITATION VELOCITY: 2.36 M/S

## 2025-02-13 PROCEDURE — 93225 XTRNL ECG REC<48 HRS REC: CPT

## 2025-02-13 PROCEDURE — 93306 TTE W/DOPPLER COMPLETE: CPT

## 2025-02-13 PROCEDURE — 93226 XTRNL ECG REC<48 HR SCAN A/R: CPT

## 2025-02-13 PROCEDURE — 93306 TTE W/DOPPLER COMPLETE: CPT | Performed by: INTERNAL MEDICINE

## 2025-02-19 ENCOUNTER — TELEPHONE (OUTPATIENT)
Dept: OTHER | Facility: HOSPITAL | Age: 89
End: 2025-02-19

## 2025-02-19 NOTE — TELEPHONE ENCOUNTER
Left voicemail for patient to return call. Please transfer to cardiac surgery office for Tiffany or Oralia.

## 2025-02-21 ENCOUNTER — OFFICE VISIT (OUTPATIENT)
Dept: CARDIOLOGY CLINIC | Facility: CLINIC | Age: 89
End: 2025-02-21
Payer: COMMERCIAL

## 2025-02-21 VITALS
OXYGEN SATURATION: 100 % | WEIGHT: 155 LBS | BODY MASS INDEX: 26.46 KG/M2 | SYSTOLIC BLOOD PRESSURE: 120 MMHG | HEIGHT: 64 IN | HEART RATE: 99 BPM | DIASTOLIC BLOOD PRESSURE: 66 MMHG

## 2025-02-21 DIAGNOSIS — N18.31 STAGE 3A CHRONIC KIDNEY DISEASE (HCC): ICD-10-CM

## 2025-02-21 DIAGNOSIS — R06.09 DYSPNEA ON EXERTION: Primary | ICD-10-CM

## 2025-02-21 DIAGNOSIS — I35.0 MODERATE AORTIC STENOSIS: ICD-10-CM

## 2025-02-21 PROCEDURE — 99214 OFFICE O/P EST MOD 30 MIN: CPT | Performed by: INTERNAL MEDICINE

## 2025-02-21 NOTE — ASSESSMENT & PLAN NOTE
2/13/2025 echo: Normal left ventricular systolic function, EF 55%.  Diastolic function normal for age.  Moderate left ventricular hypertrophy with moderate ABRIL.  Mild LAE.  Calcified aortic valve with low-flow paradoxical severe aortic stenosis.  V-max 2.7M/S, mean gradient 19 mmHg, DVI 0.30, TESSIE 0.86 cm², stroke-volume index 27.20 mL/M2.  Mild MAC with mild MR.  Mild TR.  Fibrocalcific change of the aortic root.

## 2025-02-21 NOTE — ASSESSMENT & PLAN NOTE
2/13/2025 echo: Normal left ventricular systolic function, EF 55%.  Diastolic function normal for age.  Moderate left ventricular hypertrophy with moderate ABRIL.  Mild LAE.  Calcified aortic valve with low-flow paradoxical severe aortic stenosis.  V-max 2.7M/S, mean gradient 19 mmHg, DVI 0.30, TESSIE 0.86 cm², stroke-volume index 27.20 mL/M2.  Mild MAC with mild MR.  Mild TR.  Fibrocalcific change of the aortic root.    2/13/2025  24-hour Holter monitor preliminary report (not signed) normal sinus rhythm with heart rate ranging from 55 to 228 bpm and averaging 85 bpm.  Moderate ventricular ectopy, 8485 beats or 6.9%.  10 runs of nonsustained ventricular tachycardia with the longest 5 beats at a rate of 115 bpm and the fastest 4 beats at 194 bpm.  5017 beats of SVE or 4.1%.  No evidence of atrial fibrillation/flutter.  No advanced heart block.  Diary reports shortness of breath and tracings demonstrate normal sinus rhythm with occasional PVCs.

## 2025-02-21 NOTE — PROGRESS NOTES
[facility]  [address] [place] [address]  Phone#  [phone]   Fax#  [phone]  *-*-*-*-*-*-*-*-*-*-*-*-*-*-*-*-*-*-*-*-*-*-*-*-*-*-*-*-*-*-*-*-*-*-*-*-*-*-*-*-*-*-*-*-*-*-*-*-*-*-*-*-*-*                                   Cardiology Follow Up      ENCOUNTER DATE: 25 1:11 PM  PATIENT NAME: Christian Jones   : 1935    MRN: [id number]  AGE:89 y.o.      SEX: male  ENCOUNTER PROVIDER:Michael Maier MD     PRIMARY CARE PHYSICIAN: Marvin Meadows MD    ACTIVE DIAGNOSIS AND PLAN    1. Dyspnea on exertion  Assessment & Plan:  2025 echo: Normal left ventricular systolic function, EF 55%.  Diastolic function normal for age.  Moderate left ventricular hypertrophy with moderate ABRIL.  Mild LAE.  Calcified aortic valve with low-flow paradoxical severe aortic stenosis.  V-max 2.7M/S, mean gradient 19 mmHg, DVI 0.30, TESSIE 0.86 cm², stroke-volume index 27.20 mL/M2.  Mild MAC with mild MR.  Mild TR.  Fibrocalcific change of the aortic root.    2025  24-hour Holter monitor preliminary report (not signed) normal sinus rhythm with heart rate ranging from 55 to 228 bpm and averaging 85 bpm.  Moderate ventricular ectopy, 8485 beats or 6.9%.  10 runs of nonsustained ventricular tachycardia with the longest 5 beats at a rate of 115 bpm and the fastest 4 beats at 194 bpm.  5017 beats of SVE or 4.1%.  No evidence of atrial fibrillation/flutter.  No advanced heart block.  Diary reports shortness of breath and tracings demonstrate normal sinus rhythm with occasional PVCs.  2. Moderate low-flow aortic stenosis  Assessment & Plan:  2025 echo: Normal left ventricular systolic function, EF 55%.  Diastolic function normal for age.  Moderate left ventricular hypertrophy with moderate ABRIL.  Mild LAE.  Calcified aortic valve with low-flow paradoxical severe aortic stenosis.  V-max 2.7M/S, mean gradient 19 mmHg, DVI 0.30, TESSIE 0.86 cm², stroke-volume index 27.20 mL/M2.  Mild MAC  with mild MR.  Mild TR.  Fibrocalcific change of the aortic root.  Orders:  -     CBC and differential; Future; Expected date: 03/10/2025  -     Comprehensive metabolic panel; Future; Expected date: 03/10/2025  -     Protime-INR; Future; Expected date: 03/10/2025  3. Stage 3a chronic kidney disease (HCC)  Assessment & Plan:  Lab Results   Component Value Date    EGFR 50 09/14/2024    EGFR 54 12/06/2023    EGFR 40 06/06/2023    CREATININE 1.27 09/14/2024    CREATININE 1.20 12/06/2023    CREATININE 1.54 (H) 06/06/2023     Orders:  -     Comprehensive metabolic panel; Future; Expected date: 03/10/2025     INTERVAL HISTORY:        Patient with moderate to severe low-flow aortic stenosis returns.  He denies chest discomfort of any type.  He does have dyspnea on exertion which has become progressively more severe.  He takes care of his wife at home who is disabled and probably does more work than he should be performing.  He denies any dizziness lightheadedness or near syncope.    I plans to refer him for TAVR this visit but he has already been identified by[facility] program as having severe aortic stenosis and has an appointment to see Dr. Caballero on March 21, 2025.  I explained at length what aortic stenosis was and what TAVR are involved.    DISCUSSION/PLAN:          Supported patient being evaluated for TAVR  Strongly recommend the patient reduce his physical activity and any heavy lifting.  Warned patient that there was a risk of sudden death with the disease he has.  Blood testing including a CBC, CMP and pro time in 1 month but before the appointment to see Dr. Caballero  Return in 6 months.      CARDIOLOGY HISTORY AND TESTING  Referred to cardiology by  Marvin Meadows MD for dyspnea on exertion    9/3/2021 echo: LVEF 55% grade 1 diastolic dysfunction.  Mild MR.  Mild aortic stenosis with mean gradient 10 mmHg.  Mild TR with normal PASP    12/13/2023 echo: Normal LVEF 60%.  Grade 1 diastolic dysfunction.   Mild LAE.  Moderate aortic stenosis with peak velocity 2.1, mean gradient 11 mmHg, aortic valve area 1.2 cm², stroke-volume index 30.50 mL/M squared mildly dilated aortic root at 3.7 cm    2/13/2025 echo: Normal left ventricular systolic function, EF 55%.  Diastolic function normal for age.  Moderate left ventricular hypertrophy with moderate ABRIL.  Mild LAE.  Calcified aortic valve with low-flow paradoxical severe aortic stenosis.  V-max 2.7M/S, mean gradient 19 mmHg, DVI 0.30, TESSIE 0.86 cm², stroke-volume index 27.20 mL/M2.  Mild MAC with mild MR.  Mild TR.  Fibrocalcific change of the aortic root.    2/13/2025  24-hour Holter monitor preliminary report (not signed) normal sinus rhythm with heart rate ranging from 55 to 228 bpm and averaging 85 bpm.  Moderate ventricular ectopy, 8485 beats or 6.9%.  10 runs of nonsustained ventricular tachycardia with the longest 5 beats at a rate of 115 bpm and the fastest 4 beats at 194 bpm.  5017 beats of SVE or 4.1%.  No evidence of atrial fibrillation/flutter.  No advanced heart block.  Diary reports shortness of breath and tracings demonstrate normal sinus rhythm with occasional PVCs.    ACTIVE PROBLEM LIST  Patient Active Problem List   Diagnosis    Rectal bleeding    CKD (chronic kidney disease) stage 3, GFR 30-59 ml/min (HCC)    Depression, recurrent (HCC)    Peripheral vascular disease (HCC)    Moderate low-flow aortic stenosis    Dyspnea on exertion       TODAY'S EKG  No results found for this visit on 02/21/25.      Lab Studies:    Lab Results   Component Value Date    CHOLESTEROL 200 (H) 09/14/2024    CHOLESTEROL 192 12/06/2023    CHOLESTEROL 211 (H) 02/07/2023     Lab Results   Component Value Date    TRIG 133 09/14/2024    TRIG 108 12/06/2023    TRIG 138 02/07/2023     Lab Results   Component Value Date    HDL 50 09/14/2024    HDL 54 12/06/2023    HDL 50 02/07/2023     Lab Results   Component Value Date    LDLCALC 123 (H) 09/14/2024    LDLCALC 116 (H) 12/06/2023     LDLCALC 133 (H) 02/07/2023     Lab Results   Component Value Date    EGFR 50 09/14/2024    EGFR 54 12/06/2023    EGFR 40 06/06/2023    SODIUM 139 09/14/2024    SODIUM 141 12/06/2023    SODIUM 135 06/06/2023    K 4.3 09/14/2024    K 3.9 12/06/2023    K 3.8 06/06/2023     (H) 09/14/2024     (H) 12/06/2023     (H) 06/06/2023    CO2 22 09/14/2024    CO2 22 12/06/2023    CO2 21 06/06/2023    BUN 23 09/14/2024    BUN 20 12/06/2023    BUN 21 06/06/2023    CREATININE 1.27 09/14/2024    CREATININE 1.20 12/06/2023    CREATININE 1.54 (H) 06/06/2023     Lab Results   Component Value Date    WBC 8.20 08/16/2022    WBC 10.02 02/08/2022    WBC 9.10 03/18/2019    HGB 14.9 08/16/2022    HGB 14.0 02/08/2022    HGB 13.9 03/18/2019    HCT 47.2 08/16/2022    HCT 43.6 02/08/2022    HCT 41.6 03/18/2019    MCV 90 08/16/2022    MCV 90 02/08/2022    MCV 90 03/18/2019    MCH 28.4 08/16/2022    MCH 29.0 02/08/2022    MCH 30.0 03/18/2019    MCHC 31.6 08/16/2022    MCHC 32.1 02/08/2022    MCHC 33.4 03/18/2019     08/16/2022     02/08/2022     03/18/2019      Lab Results   Component Value Date    CALCIUM 9.0 09/14/2024    CALCIUM 9.6 12/06/2023    CALCIUM 8.7 06/06/2023    ALB 4.0 09/14/2024    ALB 4.0 12/06/2023    ALB 3.5 06/06/2023    TP 7.0 09/14/2024    TP 6.8 12/06/2023    TP 7.6 06/06/2023    AST 15 09/14/2024    AST 17 12/06/2023    AST 24 06/06/2023    ALT 15 09/14/2024    ALT 17 12/06/2023    ALT 36 06/06/2023    ALKPHOS 71 09/14/2024    ALKPHOS 70 12/06/2023    ALKPHOS 89 06/06/2023     Current Outpatient Medications:     acetaminophen (TYLENOL) 650 mg CR tablet, Take 650 mg by mouth every 8 (eight) hours as needed for mild pain For arthritis pain, Disp: , Rfl:     alfuzosin (UROXATRAL) 10 mg 24 hr tablet, Take 1 tablet (10 mg total) by mouth daily, Disp: 30 tablet, Rfl: 5    amLODIPine (NORVASC) 10 mg tablet, TAKE 1 TABLET BY MOUTH EVERY DAY, Disp: 90 tablet, Rfl: 1    Multiple Vitamin  (multivitamin) tablet, Take 1 tablet by mouth daily, Disp: , Rfl:   No Known Allergies    Past Medical History:   Diagnosis Date    Arthritis 2000    Basal cell carcinoma     Head    Depression 1990    GERD (gastroesophageal reflux disease) 1990    Hyperlipemia     Hypertension     Positive fecal occult blood test     Psoriasis     Rectal bleeding     Visual impairment 1950    corrected     Social History     Socioeconomic History    Marital status: /Civil Union     Spouse name: Not on file    Number of children: Not on file    Years of education: Not on file    Highest education level: Not on file   Occupational History    Not on file   Tobacco Use    Smoking status: Former     Current packs/day: 0.00     Average packs/day: 0.3 packs/day for 15.0 years (3.8 ttl pk-yrs)     Types: Pipe, Cigarettes     Start date:      Quit date: 1985     Years since quittin.7    Smokeless tobacco: Never   Vaping Use    Vaping status: Never Used   Substance and Sexual Activity    Alcohol use: Yes     Alcohol/week: 13.0 standard drinks of alcohol     Types: 7 Glasses of wine, 6 Cans of beer per week     Comment: drink with meals    Drug use: Never    Sexual activity: Not Currently     Partners: Female     Birth control/protection: Condom Male   Other Topics Concern    Not on file   Social History Narrative    Marital Status:      As per [employer]     Social Drivers of Health     Financial Resource Strain: Low Risk  (2023)    Overall Financial Resource Strain (CARDIA)     Difficulty of Paying Living Expenses: Not hard at all   Food Insecurity: No Food Insecurity (2024)    Hunger Vital Sign     Worried About Running Out of Food in the Last Year: Never true     Ran Out of Food in the Last Year: Never true   Transportation Needs: No Transportation Needs (2024)    PRAPARE - Transportation     Lack of Transportation (Medical): No     Lack of Transportation (Non-Medical): No   Physical  "Activity: Not on file   Stress: Not on file   Social Connections: Not on file   Intimate Partner Violence: Not on file   Housing Stability: Low Risk  (12/16/2024)    Housing Stability Vital Sign     Unable to Pay for Housing in the Last Year: No     Number of Times Moved in the Last Year: 0     Homeless in the Last Year: No      Family History   Problem Relation Age of Onset    Hypertension Father     Kidney disease Father     Thrombosis Mother         cause of death     Past Surgical History:   Procedure Laterality Date    BASAL CELL CARCINOMA EXCISION      COLONOSCOPY      COLONOSCOPY  03/28/2019    HERNIA REPAIR  2012    Inguinal    LIPOMA RESECTION      Scalp    SIGMOIDOSCOPY         PREVIOUS WEIGHTS:   Wt Readings from Last 10 Encounters:   02/21/25 70.3 kg (155 lb)   02/13/25 85.7 kg (189 lb)   01/24/25 85.7 kg (189 lb)   12/16/24 71.4 kg (157 lb 6.4 oz)   09/16/24 69.9 kg (154 lb)   09/13/24 68.9 kg (152 lb)   04/17/24 72.1 kg (159 lb)   12/13/23 71.7 kg (158 lb)   12/11/23 71.7 kg (158 lb)   07/19/23 69.9 kg (154 lb)        Review of Systems:  Review of Systems   Constitutional:  Negative for activity change.   Respiratory:  Positive for shortness of breath. Negative for cough, chest tightness and wheezing.    Cardiovascular:  Negative for chest pain, palpitations and leg swelling.   Musculoskeletal:  Negative for gait problem.   Skin:  Negative for color change.   Neurological:  Negative for dizziness, tremors, syncope, weakness, light-headedness and headaches.   Psychiatric/Behavioral:  Negative for agitation and confusion.        Physical Exam:  /66   Pulse 99   Ht 5' 4\" (1.626 m)   Wt 70.3 kg (155 lb)   SpO2 100%   BMI 26.61 kg/m²     Physical Exam  Vitals reviewed.   Constitutional:       General: He is not in acute distress.     Appearance: He is well-developed.   HENT:      Head: Normocephalic and atraumatic.   Neck:      Thyroid: No thyromegaly.      Vascular: No carotid bruit or JVD.      " "Trachea: No tracheal deviation.   Cardiovascular:      Rate and Rhythm: Normal rate and regular rhythm.      Pulses: Normal pulses.      Heart sounds: Murmur heard.      Crescendo decrescendo systolic murmur is present with a grade of 3/6.      No friction rub. No gallop.   Pulmonary:      Effort: Pulmonary effort is normal. No respiratory distress.      Breath sounds: Normal breath sounds. No wheezing, rhonchi or rales.   Chest:      Chest wall: No tenderness.   Musculoskeletal:      Cervical back: Normal range of motion and neck supple.      Right lower leg: No edema.      Left lower leg: No edema.   Skin:     General: Skin is warm and dry.   Neurological:      General: No focal deficit present.      Mental Status: He is alert and oriented to person, place, and time.   Psychiatric:         Mood and Affect: Mood normal.         Behavior: Behavior normal.         Thought Content: Thought content normal.         Judgment: Judgment normal.         ======================================================  Imaging:   Results Review Statement: No pertinent imaging studies reviewed.    Portions of the record may have been created with voice recognition software. Occasional wrong word or \"sound a like\" substitutions may have occurred due to the inherent limitations of voice recognition software. Read the chart carefully and recognize, using context, where substitutions have occurred.    SIGNATURES:   Michael Maier MD   "

## 2025-02-21 NOTE — LETTER
2025     DIMAS Caballero MD  [address] [facility] [address] [place] [id number]    Patient: Christian Jones   YOB: 1935   Date of Visit: 2025       Dear Dr. Caballero:    Thank you for referring Christian Jones to me for evaluation. Below are my notes for this consultation.    If you have questions, please do not hesitate to call me. I look forward to following your patient along with you.         Sincerely,        Michael Maier MD        CC: MD Michael Musa MD  2025  1:11 PM  Sign when Signing Visit     [facility]  [address] [place] [address]  Phone#  [phone]   Fax#  [phone]  *-*-*-*-*-*-*-*-*-*-*-*-*-*-*-*-*-*-*-*-*-*-*-*-*-*-*-*-*-*-*-*-*-*-*-*-*-*-*-*-*-*-*-*-*-*-*-*-*-*-*-*-*-*                                   Cardiology Follow Up      ENCOUNTER DATE: 25 1:11 PM  PATIENT NAME: Christian Jones   : 1935    MRN: [id number]  AGE:89 y.o.      SEX: male  ENCOUNTER PROVIDER:Michael Maier MD     PRIMARY CARE PHYSICIAN: Marvin Meadows MD    ACTIVE DIAGNOSIS AND PLAN    1. Dyspnea on exertion  Assessment & Plan:  2025 echo: Normal left ventricular systolic function, EF 55%.  Diastolic function normal for age.  Moderate left ventricular hypertrophy with moderate ABRIL.  Mild LAE.  Calcified aortic valve with low-flow paradoxical severe aortic stenosis.  V-max 2.7M/S, mean gradient 19 mmHg, DVI 0.30, TESSIE 0.86 cm², stroke-volume index 27.20 mL/M2.  Mild MAC with mild MR.  Mild TR.  Fibrocalcific change of the aortic root.    2025  24-hour Holter monitor preliminary report (not signed) normal sinus rhythm with heart rate ranging from 55 to 228 bpm and averaging 85 bpm.  Moderate ventricular ectopy, 8485 beats or 6.9%.  10 runs of nonsustained ventricular tachycardia with the longest 5 beats at a rate of 115 bpm and the fastest 4 beats at 194 bpm.  5017 beats of SVE or  4.1%.  No evidence of atrial fibrillation/flutter.  No advanced heart block.  Diary reports shortness of breath and tracings demonstrate normal sinus rhythm with occasional PVCs.  2. Moderate low-flow aortic stenosis  Assessment & Plan:  2/13/2025 echo: Normal left ventricular systolic function, EF 55%.  Diastolic function normal for age.  Moderate left ventricular hypertrophy with moderate ABRIL.  Mild LAE.  Calcified aortic valve with low-flow paradoxical severe aortic stenosis.  V-max 2.7M/S, mean gradient 19 mmHg, DVI 0.30, TESSIE 0.86 cm², stroke-volume index 27.20 mL/M2.  Mild MAC with mild MR.  Mild TR.  Fibrocalcific change of the aortic root.  Orders:  -     CBC and differential; Future; Expected date: 03/10/2025  -     Comprehensive metabolic panel; Future; Expected date: 03/10/2025  -     Protime-INR; Future; Expected date: 03/10/2025  3. Stage 3a chronic kidney disease (HCC)  Assessment & Plan:  Lab Results   Component Value Date    EGFR 50 09/14/2024    EGFR 54 12/06/2023    EGFR 40 06/06/2023    CREATININE 1.27 09/14/2024    CREATININE 1.20 12/06/2023    CREATININE 1.54 (H) 06/06/2023     Orders:  -     Comprehensive metabolic panel; Future; Expected date: 03/10/2025     INTERVAL HISTORY:        Patient with moderate to severe low-flow aortic stenosis returns.  He denies chest discomfort of any type.  He does have dyspnea on exertion which has become progressively more severe.  He takes care of his wife at home who is disabled and probably does more work than he should be performing.  He denies any dizziness lightheadedness or near syncope.    I plans to refer him for TAVR this visit but he has already been identified by[facility] program as having severe aortic stenosis and has an appointment to see Dr. Caballero on March 21, 2025.  I explained at length what aortic stenosis was and what TAVR are involved.    DISCUSSION/PLAN:          Supported patient being evaluated for TAVR  Strongly recommend the patient  reduce his physical activity and any heavy lifting.  Warned patient that there was a risk of sudden death with the disease he has.  Blood testing including a CBC, CMP and pro time in 1 month but before the appointment to see Dr. Caballero  Return in 6 months.      CARDIOLOGY HISTORY AND TESTING  Referred to cardiology by  Marvin Meadows MD for dyspnea on exertion    9/3/2021 echo: LVEF 55% grade 1 diastolic dysfunction.  Mild MR.  Mild aortic stenosis with mean gradient 10 mmHg.  Mild TR with normal PASP    12/13/2023 echo: Normal LVEF 60%.  Grade 1 diastolic dysfunction.  Mild LAE.  Moderate aortic stenosis with peak velocity 2.1, mean gradient 11 mmHg, aortic valve area 1.2 cm², stroke-volume index 30.50 mL/M squared mildly dilated aortic root at 3.7 cm    2/13/2025 echo: Normal left ventricular systolic function, EF 55%.  Diastolic function normal for age.  Moderate left ventricular hypertrophy with moderate ABRIL.  Mild LAE.  Calcified aortic valve with low-flow paradoxical severe aortic stenosis.  V-max 2.7M/S, mean gradient 19 mmHg, DVI 0.30, TESSIE 0.86 cm², stroke-volume index 27.20 mL/M2.  Mild MAC with mild MR.  Mild TR.  Fibrocalcific change of the aortic root.    2/13/2025  24-hour Holter monitor preliminary report (not signed) normal sinus rhythm with heart rate ranging from 55 to 228 bpm and averaging 85 bpm.  Moderate ventricular ectopy, 8485 beats or 6.9%.  10 runs of nonsustained ventricular tachycardia with the longest 5 beats at a rate of 115 bpm and the fastest 4 beats at 194 bpm.  5017 beats of SVE or 4.1%.  No evidence of atrial fibrillation/flutter.  No advanced heart block.  Diary reports shortness of breath and tracings demonstrate normal sinus rhythm with occasional PVCs.    ACTIVE PROBLEM LIST  Patient Active Problem List   Diagnosis   • Rectal bleeding   • CKD (chronic kidney disease) stage 3, GFR 30-59 ml/min ([facility])   • Depression, recurrent ([facility])   • Peripheral vascular disease ([facility])    • Moderate low-flow aortic stenosis   • Dyspnea on exertion       TODAY'S EKG  No results found for this visit on 02/21/25.      Lab Studies:    Lab Results   Component Value Date    CHOLESTEROL 200 (H) 09/14/2024    CHOLESTEROL 192 12/06/2023    CHOLESTEROL 211 (H) 02/07/2023     Lab Results   Component Value Date    TRIG 133 09/14/2024    TRIG 108 12/06/2023    TRIG 138 02/07/2023     Lab Results   Component Value Date    HDL 50 09/14/2024    HDL 54 12/06/2023    HDL 50 02/07/2023     Lab Results   Component Value Date    LDLCALC 123 (H) 09/14/2024    LDLCALC 116 (H) 12/06/2023    LDLCALC 133 (H) 02/07/2023     Lab Results   Component Value Date    EGFR 50 09/14/2024    EGFR 54 12/06/2023    EGFR 40 06/06/2023    SODIUM 139 09/14/2024    SODIUM 141 12/06/2023    SODIUM 135 06/06/2023    K 4.3 09/14/2024    K 3.9 12/06/2023    K 3.8 06/06/2023     (H) 09/14/2024     (H) 12/06/2023     (H) 06/06/2023    CO2 22 09/14/2024    CO2 22 12/06/2023    CO2 21 06/06/2023    BUN 23 09/14/2024    BUN 20 12/06/2023    BUN 21 06/06/2023    CREATININE 1.27 09/14/2024    CREATININE 1.20 12/06/2023    CREATININE 1.54 (H) 06/06/2023     Lab Results   Component Value Date    WBC 8.20 08/16/2022    WBC 10.02 02/08/2022    WBC 9.10 03/18/2019    HGB 14.9 08/16/2022    HGB 14.0 02/08/2022    HGB 13.9 03/18/2019    HCT 47.2 08/16/2022    HCT 43.6 02/08/2022    HCT 41.6 03/18/2019    MCV 90 08/16/2022    MCV 90 02/08/2022    MCV 90 03/18/2019    MCH 28.4 08/16/2022    MCH 29.0 02/08/2022    MCH 30.0 03/18/2019    MCHC 31.6 08/16/2022    MCHC 32.1 02/08/2022    MCHC 33.4 03/18/2019     08/16/2022     02/08/2022     03/18/2019      Lab Results   Component Value Date    CALCIUM 9.0 09/14/2024    CALCIUM 9.6 12/06/2023    CALCIUM 8.7 06/06/2023    ALB 4.0 09/14/2024    ALB 4.0 12/06/2023    ALB 3.5 06/06/2023    TP 7.0 09/14/2024    TP 6.8 12/06/2023    TP 7.6 06/06/2023    AST 15 09/14/2024    AST 17  2023    AST 24 2023    ALT 15 2024    ALT 17 2023    ALT 36 2023    ALKPHOS 71 2024    ALKPHOS 70 2023    ALKPHOS 89 2023     Current Outpatient Medications:   •  acetaminophen (TYLENOL) 650 mg CR tablet, Take 650 mg by mouth every 8 (eight) hours as needed for mild pain For arthritis pain, Disp: , Rfl:   •  alfuzosin (UROXATRAL) 10 mg 24 hr tablet, Take 1 tablet (10 mg total) by mouth daily, Disp: 30 tablet, Rfl: 5  •  amLODIPine (NORVASC) 10 mg tablet, TAKE 1 TABLET BY MOUTH EVERY DAY, Disp: 90 tablet, Rfl: 1  •  Multiple Vitamin (multivitamin) tablet, Take 1 tablet by mouth daily, Disp: , Rfl:   No Known Allergies    Past Medical History:   Diagnosis Date   • Arthritis 2000   • Basal cell carcinoma     Head   • Depression 1990   • GERD (gastroesophageal reflux disease) 1990   • Hyperlipemia    • Hypertension    • Positive fecal occult blood test    • Psoriasis    • Rectal bleeding    • Visual impairment 1950    corrected     Social History     Socioeconomic History   • Marital status: /Civil Union     Spouse name: Not on file   • Number of children: Not on file   • Years of education: Not on file   • Highest education level: Not on file   Occupational History   • Not on file   Tobacco Use   • Smoking status: Former     Current packs/day: 0.00     Average packs/day: 0.3 packs/day for 15.0 years (3.8 ttl pk-yrs)     Types: Pipe, Cigarettes     Start date:      Quit date: 1985     Years since quittin.7   • Smokeless tobacco: Never   Vaping Use   • Vaping status: Never Used   Substance and Sexual Activity   • Alcohol use: Yes     Alcohol/week: 13.0 standard drinks of alcohol     Types: 7 Glasses of wine, 6 Cans of beer per week     Comment: drink with meals   • Drug use: Never   • Sexual activity: Not Currently     Partners: Female     Birth control/protection: Condom Male   Other Topics Concern   • Not on file   Social History Narrative     Marital Status:      As per [employer]     Social Drivers of Health     Financial Resource Strain: Low Risk  (12/4/2023)    Overall Financial Resource Strain (CARDIA)    • Difficulty of Paying Living Expenses: Not hard at all   Food Insecurity: No Food Insecurity (12/16/2024)    Hunger Vital Sign    • Worried About Running Out of Food in the Last Year: Never true    • Ran Out of Food in the Last Year: Never true   Transportation Needs: No Transportation Needs (12/16/2024)    PRAPARE - Transportation    • Lack of Transportation (Medical): No    • Lack of Transportation (Non-Medical): No   Physical Activity: Not on file   Stress: Not on file   Social Connections: Not on file   Intimate Partner Violence: Not on file   Housing Stability: Low Risk  (12/16/2024)    Housing Stability Vital Sign    • Unable to Pay for Housing in the Last Year: No    • Number of Times Moved in the Last Year: 0    • Homeless in the Last Year: No      Family History   Problem Relation Age of Onset   • Hypertension Father    • Kidney disease Father    • Thrombosis Mother         cause of death     Past Surgical History:   Procedure Laterality Date   • BASAL CELL CARCINOMA EXCISION     • COLONOSCOPY     • COLONOSCOPY  03/28/2019   • HERNIA REPAIR  2012    Inguinal   • LIPOMA RESECTION      Scalp   • SIGMOIDOSCOPY         PREVIOUS WEIGHTS:   Wt Readings from Last 10 Encounters:   02/21/25 70.3 kg (155 lb)   02/13/25 85.7 kg (189 lb)   01/24/25 85.7 kg (189 lb)   12/16/24 71.4 kg (157 lb 6.4 oz)   09/16/24 69.9 kg (154 lb)   09/13/24 68.9 kg (152 lb)   04/17/24 72.1 kg (159 lb)   12/13/23 71.7 kg (158 lb)   12/11/23 71.7 kg (158 lb)   07/19/23 69.9 kg (154 lb)        Review of Systems:  Review of Systems   Constitutional:  Negative for activity change.   Respiratory:  Positive for shortness of breath. Negative for cough, chest tightness and wheezing.    Cardiovascular:  Negative for chest pain, palpitations and leg swelling.  "  Musculoskeletal:  Negative for gait problem.   Skin:  Negative for color change.   Neurological:  Negative for dizziness, tremors, syncope, weakness, light-headedness and headaches.   Psychiatric/Behavioral:  Negative for agitation and confusion.        Physical Exam:  /66   Pulse 99   Ht 5' 4\" (1.626 m)   Wt 70.3 kg (155 lb)   SpO2 100%   BMI 26.61 kg/m²     Physical Exam  Vitals reviewed.   Constitutional:       General: He is not in acute distress.     Appearance: He is well-developed.   HENT:      Head: Normocephalic and atraumatic.   Neck:      Thyroid: No thyromegaly.      Vascular: No carotid bruit or JVD.      Trachea: No tracheal deviation.   Cardiovascular:      Rate and Rhythm: Normal rate and regular rhythm.      Pulses: Normal pulses.      Heart sounds: Murmur heard.      Crescendo decrescendo systolic murmur is present with a grade of 3/6.      No friction rub. No gallop.   Pulmonary:      Effort: Pulmonary effort is normal. No respiratory distress.      Breath sounds: Normal breath sounds. No wheezing, rhonchi or rales.   Chest:      Chest wall: No tenderness.   Musculoskeletal:      Cervical back: Normal range of motion and neck supple.      Right lower leg: No edema.      Left lower leg: No edema.   Skin:     General: Skin is warm and dry.   Neurological:      General: No focal deficit present.      Mental Status: He is alert and oriented to person, place, and time.   Psychiatric:         Mood and Affect: Mood normal.         Behavior: Behavior normal.         Thought Content: Thought content normal.         Judgment: Judgment normal.         ======================================================  Imaging:   Results Review Statement: No pertinent imaging studies reviewed.    Portions of the record may have been created with voice recognition software. Occasional wrong word or \"sound a like\" substitutions may have occurred due to the inherent limitations of voice recognition software. " Read the chart carefully and recognize, using context, where substitutions have occurred.    SIGNATURES:   Michael Maier MD

## 2025-02-23 DIAGNOSIS — I47.29 NSVT (NONSUSTAINED VENTRICULAR TACHYCARDIA) (HCC): Primary | ICD-10-CM

## 2025-02-23 RX ORDER — METOPROLOL SUCCINATE 25 MG/1
25 TABLET, EXTENDED RELEASE ORAL DAILY
Qty: 90 TABLET | Refills: 3 | Status: SHIPPED | OUTPATIENT
Start: 2025-02-23

## 2025-02-23 NOTE — PROGRESS NOTES
Due to ventricular tachycardia noted on his Holter monitor, I would like him to get a magnesium level and also start metoprolol 25 mg daily.  I have sent a prescription for metoprolol to his pharmacy at [facility] in [place].  I have placed an order for him to get a magnesium level and potassium level. These blood tests are non-fasting.

## 2025-02-24 ENCOUNTER — APPOINTMENT (OUTPATIENT)
Dept: LAB | Facility: CLINIC | Age: 89
End: 2025-02-24
Payer: COMMERCIAL

## 2025-02-24 ENCOUNTER — RESULTS FOLLOW-UP (OUTPATIENT)
Dept: CARDIOLOGY CLINIC | Facility: CLINIC | Age: 89
End: 2025-02-24

## 2025-02-24 DIAGNOSIS — I47.29 NSVT (NONSUSTAINED VENTRICULAR TACHYCARDIA) (HCC): ICD-10-CM

## 2025-02-24 DIAGNOSIS — N18.31 STAGE 3A CHRONIC KIDNEY DISEASE (HCC): ICD-10-CM

## 2025-02-24 DIAGNOSIS — I35.0 MODERATE AORTIC STENOSIS: ICD-10-CM

## 2025-02-24 LAB
ALBUMIN SERPL BCG-MCNC: 3.9 G/DL (ref 3.5–5)
ALP SERPL-CCNC: 69 U/L (ref 34–104)
ALT SERPL W P-5'-P-CCNC: 18 U/L (ref 7–52)
ANION GAP SERPL CALCULATED.3IONS-SCNC: 8 MMOL/L (ref 4–13)
AST SERPL W P-5'-P-CCNC: 17 U/L (ref 13–39)
BASOPHILS # BLD AUTO: 0.04 THOUSANDS/ÂΜL (ref 0–0.1)
BASOPHILS NFR BLD AUTO: 1 % (ref 0–1)
BILIRUB SERPL-MCNC: 0.54 MG/DL (ref 0.2–1)
BUN SERPL-MCNC: 24 MG/DL (ref 5–25)
CALCIUM SERPL-MCNC: 9.3 MG/DL (ref 8.4–10.2)
CHLORIDE SERPL-SCNC: 108 MMOL/L (ref 96–108)
CO2 SERPL-SCNC: 24 MMOL/L (ref 21–32)
CREAT SERPL-MCNC: 1.22 MG/DL (ref 0.6–1.3)
EOSINOPHIL # BLD AUTO: 0.43 THOUSAND/ÂΜL (ref 0–0.61)
EOSINOPHIL NFR BLD AUTO: 5 % (ref 0–6)
ERYTHROCYTE [DISTWIDTH] IN BLOOD BY AUTOMATED COUNT: 15.2 % (ref 11.6–15.1)
GFR SERPL CREATININE-BSD FRML MDRD: 52 ML/MIN/1.73SQ M
GLUCOSE P FAST SERPL-MCNC: 105 MG/DL (ref 65–99)
HCT VFR BLD AUTO: 40 % (ref 36.5–49.3)
HGB BLD-MCNC: 13 G/DL (ref 12–17)
IMM GRANULOCYTES # BLD AUTO: 0.01 THOUSAND/UL (ref 0–0.2)
IMM GRANULOCYTES NFR BLD AUTO: 0 % (ref 0–2)
INR PPP: 1.04 (ref 0.85–1.19)
LYMPHOCYTES # BLD AUTO: 2.27 THOUSANDS/ÂΜL (ref 0.6–4.47)
LYMPHOCYTES NFR BLD AUTO: 26 % (ref 14–44)
MAGNESIUM SERPL-MCNC: 2.1 MG/DL (ref 1.9–2.7)
MCH RBC QN AUTO: 28.9 PG (ref 26.8–34.3)
MCHC RBC AUTO-ENTMCNC: 32.5 G/DL (ref 31.4–37.4)
MCV RBC AUTO: 89 FL (ref 82–98)
MONOCYTES # BLD AUTO: 0.87 THOUSAND/ÂΜL (ref 0.17–1.22)
MONOCYTES NFR BLD AUTO: 10 % (ref 4–12)
NEUTROPHILS # BLD AUTO: 5.02 THOUSANDS/ÂΜL (ref 1.85–7.62)
NEUTS SEG NFR BLD AUTO: 58 % (ref 43–75)
NRBC BLD AUTO-RTO: 0 /100 WBCS
PLATELET # BLD AUTO: 222 THOUSANDS/UL (ref 149–390)
PMV BLD AUTO: 10.8 FL (ref 8.9–12.7)
POTASSIUM SERPL-SCNC: 4.3 MMOL/L (ref 3.5–5.3)
PROT SERPL-MCNC: 6.8 G/DL (ref 6.4–8.4)
PROTHROMBIN TIME: 13.9 SECONDS (ref 12.3–15)
RBC # BLD AUTO: 4.5 MILLION/UL (ref 3.88–5.62)
SODIUM SERPL-SCNC: 140 MMOL/L (ref 135–147)
WBC # BLD AUTO: 8.64 THOUSAND/UL (ref 4.31–10.16)

## 2025-02-24 PROCEDURE — 80053 COMPREHEN METABOLIC PANEL: CPT

## 2025-02-24 PROCEDURE — 85025 COMPLETE CBC W/AUTO DIFF WBC: CPT

## 2025-02-24 PROCEDURE — 36415 COLL VENOUS BLD VENIPUNCTURE: CPT

## 2025-02-24 PROCEDURE — 83735 ASSAY OF MAGNESIUM: CPT

## 2025-02-24 PROCEDURE — 85610 PROTHROMBIN TIME: CPT

## 2025-02-24 NOTE — TELEPHONE ENCOUNTER
Placed call to patient. He verbalized understanding. He stated he will go to [facility]'s lab - [place] today to get blood work drawn. He has no questions or concerns at this time.

## 2025-02-25 ENCOUNTER — RESULTS FOLLOW-UP (OUTPATIENT)
Dept: CARDIOLOGY CLINIC | Facility: CLINIC | Age: 89
End: 2025-02-25

## 2025-02-25 NOTE — TELEPHONE ENCOUNTER
Placed call to patient. He verbalized understanding and has no questions or concerns at this time.

## 2025-03-15 DIAGNOSIS — N40.1 BPH WITH URINARY OBSTRUCTION: ICD-10-CM

## 2025-03-15 DIAGNOSIS — N13.8 BPH WITH URINARY OBSTRUCTION: ICD-10-CM

## 2025-03-17 RX ORDER — ALFUZOSIN HYDROCHLORIDE 10 MG/1
10 TABLET, EXTENDED RELEASE ORAL DAILY
Qty: 30 TABLET | Refills: 5 | Status: SHIPPED | OUTPATIENT
Start: 2025-03-17

## 2025-03-21 ENCOUNTER — TELEPHONE (OUTPATIENT)
Dept: CARDIOLOGY CLINIC | Facility: CLINIC | Age: 89
End: 2025-03-21

## 2025-03-21 ENCOUNTER — OFFICE VISIT (OUTPATIENT)
Dept: CARDIAC SURGERY | Facility: CLINIC | Age: 89
End: 2025-03-21
Payer: COMMERCIAL

## 2025-03-21 VITALS
BODY MASS INDEX: 26.77 KG/M2 | HEIGHT: 64 IN | WEIGHT: 156.8 LBS | SYSTOLIC BLOOD PRESSURE: 150 MMHG | OXYGEN SATURATION: 99 % | HEART RATE: 65 BPM | DIASTOLIC BLOOD PRESSURE: 71 MMHG

## 2025-03-21 DIAGNOSIS — I47.29 NSVT (NONSUSTAINED VENTRICULAR TACHYCARDIA) (HCC): Primary | ICD-10-CM

## 2025-03-21 DIAGNOSIS — Z13.6 ENCOUNTER FOR SCREENING FOR STENOSIS OF CAROTID ARTERY: ICD-10-CM

## 2025-03-21 DIAGNOSIS — I35.0 NONRHEUMATIC AORTIC VALVE STENOSIS: Primary | ICD-10-CM

## 2025-03-21 DIAGNOSIS — R06.09 DYSPNEA ON EXERTION: ICD-10-CM

## 2025-03-21 DIAGNOSIS — I35.0 MODERATE AORTIC STENOSIS: ICD-10-CM

## 2025-03-21 PROCEDURE — 99204 OFFICE O/P NEW MOD 45 MIN: CPT | Performed by: THORACIC SURGERY (CARDIOTHORACIC VASCULAR SURGERY)

## 2025-03-21 NOTE — TELEPHONE ENCOUNTER
Left voicemail on machine informing patient to call and schedule a RIGHT & LEFT  Heart Cath procedure.     Thanks,  Lizette Maldonado

## 2025-03-21 NOTE — TELEPHONE ENCOUNTER
----- Message from Oralia JONES sent at 3/21/2025 11:44 AM EDT -----  Regarding: Cath  Please schedule patient for:     Pre TAVR Cardiac Cath: to be scheduled in the next few weeks. Patient has [employer] as primary insurance and had recent bloodwork.    Please schedule with either Dr. Anderson, Dr. Mckeon, Dr. Singleton or Dr. Carney     To be done at: [facility]     To be done by:     Please address any questions regarding this request to Oralia Rodriguez or Tiffany Chao,     Thank you.

## 2025-03-21 NOTE — PROGRESS NOTES
Consultation - Cardiac Surgery   Christian Jones 89 y.o. male MRN: [id number]    Primary Cardiologist: Dr. Maier     Reason for Consult / Principal Problem: Aortic stenosis, Non-Rheumatic    History of Present Illness: Christian Jones is a 89 y.o. year old male who presents for initial outpatient surgical consultation for severe symptomatic aortic stenosis. He has a PMHx known AS, HTN, HLD, CKD3a, BCC with excision and flap on his scalp (patient states in the 90's), arthritis, h/o rectal bleeding. He was recently evaluated by Dr. Maier, and has an echo from February of this year that demonstrates progression of his aortic stenosis - this has been monitored since about 2021. Over the last year, he has become progressively more symptomatic. He was identified by [facility] for TAVR evaluation.     Upon interview today, patient admits that for the past year he has been experiencing dyspnea on exertion especially with walking long distances and going up hills and stairs.  He denies chest pain, palpitations, LE edema, LH, syncope or presyncope, orthopnea, PND. He lives independently with his wife in a 55+ community. He takes care of his wife, as she had a stroke 2 years ago. Patient is a remote former smoker, drinks a glass of wine about 3 days a week, no recreational drug use. He sees his dentist regularly, last visit was about 2 weeks ago.     Past Medical History:  Past Medical History:   Diagnosis Date    Arthritis 1/2000    Basal cell carcinoma     Head    Depression 1/1990    GERD (gastroesophageal reflux disease) 1/1990    Hyperlipemia     Hypertension     Positive fecal occult blood test     Psoriasis     Rectal bleeding     Visual impairment 1/1950    corrected         Past Surgical History:   Past Surgical History:   Procedure Laterality Date    BASAL CELL CARCINOMA EXCISION      COLONOSCOPY      COLONOSCOPY  03/28/2019    HERNIA REPAIR  2012    Inguinal    LIPOMA RESECTION      Scalp    SIGMOIDOSCOPY            Family History:  Family History   Problem Relation Age of Onset    Hypertension Father     Kidney disease Father     Thrombosis Mother         cause of death         Social History:    Social History     Substance and Sexual Activity   Alcohol Use Yes    Alcohol/week: 2.0 standard drinks of alcohol    Types: 2 Glasses of wine per week    Comment: per week     Social History     Substance and Sexual Activity   Drug Use Never     Social History     Tobacco Use   Smoking Status Former    Current packs/day: 0.00    Average packs/day: 0.3 packs/day for 15.0 years (3.8 ttl pk-yrs)    Types: Pipe, Cigarettes    Start date:     Quit date: 1985    Years since quittin.8   Smokeless Tobacco Never       Home Medications:   Prior to Admission medications    Medication Sig Start Date End Date Taking? Authorizing Provider   acetaminophen (TYLENOL) 650 mg CR tablet Take 650 mg by mouth every 8 (eight) hours as needed for mild pain For arthritis pain   Yes Historical Provider, MD   alfuzosin (UROXATRAL) 10 mg 24 hr tablet TAKE 1 TABLET BY MOUTH DAILY 3/17/25  Yes KAI Vegas   amLODIPine (NORVASC) 10 mg tablet TAKE 1 TABLET BY MOUTH EVERY DAY 24  Yes Marvin Meadows MD   metoprolol succinate (TOPROL-XL) 25 mg 24 hr tablet Take 1 tablet (25 mg total) by mouth daily In AM 25  Yes Michael Maier MD   Multiple Vitamin (multivitamin) tablet Take 1 tablet by mouth daily   Yes Historical Provider, MD       Allergies:  No Known Allergies    Review of Systems:     Review of Systems   Constitutional:  Positive for activity change. Negative for chills and fever.   HENT:  Negative for ear pain and sore throat.    Eyes:  Negative for pain and visual disturbance.   Respiratory:  Positive for shortness of breath. Negative for cough.    Cardiovascular:  Negative for chest pain and palpitations.   Gastrointestinal:  Negative for abdominal pain and vomiting.   Genitourinary:  Negative for dysuria and  "hematuria.   Musculoskeletal:  Negative for arthralgias and back pain.   Skin:  Negative for color change and rash.   Neurological:  Negative for seizures and syncope.   All other systems reviewed and are negative.      Vital Signs:     Vitals:    03/21/25 1006 03/21/25 1008   BP: 145/77 150/71   BP Location: Left arm Right arm   Patient Position: Sitting Sitting   Cuff Size: Standard Standard   Pulse: 65    SpO2: 99%    Weight: 71.1 kg (156 lb 12.8 oz)    Height: 5' 4\" (1.626 m)        Physical Exam:     Physical Exam  Vitals reviewed.   Constitutional:       Appearance: Normal appearance.   HENT:      Head: Normocephalic and atraumatic.   Eyes:      Pupils: Pupils are equal, round, and reactive to light.   Neck:      Vascular: No carotid bruit or JVD.   Cardiovascular:      Rate and Rhythm: Normal rate and regular rhythm.      Pulses:           Carotid pulses are 2+ on the right side and 2+ on the left side.       Radial pulses are 2+ on the right side and 2+ on the left side.        Dorsalis pedis pulses are 2+ on the right side and 2+ on the left side.      Heart sounds: Murmur heard.      Systolic murmur is present with a grade of 3/6.      No friction rub. No gallop.   Pulmonary:      Effort: Pulmonary effort is normal.      Breath sounds: Normal breath sounds. No wheezing, rhonchi or rales.   Abdominal:      Palpations: Abdomen is soft.      Tenderness: There is no abdominal tenderness.   Musculoskeletal:      Cervical back: Normal range of motion.      Right lower leg: No edema.      Left lower leg: No edema.   Skin:     General: Skin is warm and dry.      Capillary Refill: Capillary refill takes less than 2 seconds.   Neurological:      General: No focal deficit present.      Mental Status: He is alert.      Sensory: Sensation is intact.   Psychiatric:         Attention and Perception: Attention normal.         Mood and Affect: Mood normal.         Behavior: Behavior normal. Behavior is cooperative. " "      Lab Results:               Invalid input(s): \"LABGLOM\"      No results found for: \"HGBA1C\"  No results found for: \"CKTOTAL\", \"CKMB\", \"CKMBINDEX\", \"TROPONINI\"    Imaging Studies:     Echocardiogram: 2/13/25  Left Ventricle Left ventricular cavity size is normal. Wall thickness is moderately increased. There is moderate asymmetric hypertrophy of the septal wall. The left ventricular ejection fraction is 55%. Systolic function is normal. Wall motion is normal. Diastolic function is mildly abnormal, consistent with grade I (abnormal) relaxation.   Right Ventricle Right ventricular cavity size is normal. Systolic function is normal. Wall thickness is normal.   Left Atrium The atrium is mildly dilated.   Right Atrium The atrium is normal in size.   Aortic Valve The aortic valve is trileaflet. The leaflets are moderately thickened. The leaflets are moderately calcified. There is moderately reduced mobility. There is no evidence of regurgitation. There is low flow, paradoxical severe aortic stenosis. The aortic valve peak velocity is 2.7 m/s. The aortic valve mean gradient is 19 mmHg. The dimensionless velocity index is 0.30. The aortic valve area is 0.86 cm2. The stroke volume index is 27.20 ml/m2.   Mitral Valve The leaflets exhibit normal mobility. There is mild annular calcification.  There is mild regurgitation. There is no evidence of stenosis.   Tricuspid Valve Tricuspid valve structure is normal. There is mild regurgitation. There is no evidence of stenosis.   Pulmonic Valve Pulmonic valve structure is normal. There is no evidence of regurgitation. There is no evidence of stenosis.   Ascending Aorta The aortic root is normal in size. The aortic root exhibited mild fibrocalcific change.   Pericardium There is no pericardial effusion. The pericardium is normal in appearance.       Results Review Statement: I personally reviewed the following image studies in PACS and associated radiology reports: Echocardiogram. " My interpretation of the radiology images/reports is: agree with above interpretation.    TAVR evaluation Assessment:     [facility]: III    STS Risk Score: 4.39 %, mortality risk    Aortic Stenosis Stage: D2    5 Meter Walk Test:      Attempt 1: 7   Attempt 2: 6   Attempt 3: 6    KCCQ-12 completed        Assessment:  Patient Active Problem List    Diagnosis Date Noted    Moderate low-flow aortic stenosis 01/20/2025    Dyspnea on exertion 01/20/2025    Peripheral vascular disease (HCC) 09/13/2024    Depression, recurrent (HCC) 02/13/2023    CKD (chronic kidney disease) stage 3, GFR 30-59 ml/min (HCC) 02/07/2022    Rectal bleeding 03/20/2019     Severe aortic stenosis; Ongoing TAVR workup    Plan:    Christian Jones has severe symptomatic aortic stenosis. Based on their STS risk assessment, they will undergo the following testing for transcatheter aortic valve replacement: gated CTA of the chest, abdomen, and pelvis, cardiac catheterization, dental clearance, and carotid ultrasound.    Once these studies have been completed, Christian Jones will follow up in our office to review the results and confirm the suitability of proceeding with transcatheter aortic valve replacment.    Shared decision-making encounter occurred during this visit. Additionally, heart team evaluation of suitability for surgical replacement has been completed.      Christian Jones was comfortable with our recommendations, and their questions were answered to their satisfaction.  We will continue to evaluate the patient, with a final surgical recommendation pending the above work up.  Thank you for allowing us to participate in the care of this patient.     Routine referral to gastroenterology for colonoscopy screening was not indicated, as the patient is over 76 years old    SIGNATURE: Jennifer Goss PA-C  DATE: March 21, 2025  TIME: 10:10 AM    * This note was completed in part utilizing m-modal fluency direct voice recognition software.    Grammatical errors, random word insertion, spelling mistakes, and incomplete sentences may be an occasional consequence of the system secondary to software limitations, ambient noise and hardware issues. At the time of dictation, efforts were made to edit, clarify and /or correct errors. Please read the chart carefully and recognize, using context, where substitutions have occurred.  If you have any questions or concerns about the context, text or information contained within the body of this dictation, please contact myself, the provider, for further clarification.

## 2025-03-21 NOTE — H&P (VIEW-ONLY)
Consultation - Cardiac Surgery   Christian Jones 89 y.o. male MRN: [id number]    Primary Cardiologist: Dr. Maier     Reason for Consult / Principal Problem: Aortic stenosis, Non-Rheumatic    History of Present Illness: Christian Jones is a 89 y.o. year old male who presents for initial outpatient surgical consultation for severe symptomatic aortic stenosis. He has a PMHx known AS, HTN, HLD, CKD3a, BCC with excision and flap on his scalp (patient states in the 90's), arthritis, h/o rectal bleeding. He was recently evaluated by Dr. Maier, and has an echo from February of this year that demonstrates progression of his aortic stenosis - this has been monitored since about 2021. Over the last year, he has become progressively more symptomatic. He was identified by [facility] for TAVR evaluation.     Upon interview today, patient admits that for the past year he has been experiencing dyspnea on exertion especially with walking long distances and going up hills and stairs.  He denies chest pain, palpitations, LE edema, LH, syncope or presyncope, orthopnea, PND. He lives independently with his wife in a 55+ community. He takes care of his wife, as she had a stroke 2 years ago. Patient is a remote former smoker, drinks a glass of wine about 3 days a week, no recreational drug use. He sees his dentist regularly, last visit was about 2 weeks ago.     Past Medical History:  Past Medical History:   Diagnosis Date    Arthritis 1/2000    Basal cell carcinoma     Head    Depression 1/1990    GERD (gastroesophageal reflux disease) 1/1990    Hyperlipemia     Hypertension     Positive fecal occult blood test     Psoriasis     Rectal bleeding     Visual impairment 1/1950    corrected         Past Surgical History:   Past Surgical History:   Procedure Laterality Date    BASAL CELL CARCINOMA EXCISION      COLONOSCOPY      COLONOSCOPY  03/28/2019    HERNIA REPAIR  2012    Inguinal    LIPOMA RESECTION      Scalp    SIGMOIDOSCOPY            Family History:  Family History   Problem Relation Age of Onset    Hypertension Father     Kidney disease Father     Thrombosis Mother         cause of death         Social History:    Social History     Substance and Sexual Activity   Alcohol Use Yes    Alcohol/week: 2.0 standard drinks of alcohol    Types: 2 Glasses of wine per week    Comment: per week     Social History     Substance and Sexual Activity   Drug Use Never     Social History     Tobacco Use   Smoking Status Former    Current packs/day: 0.00    Average packs/day: 0.3 packs/day for 15.0 years (3.8 ttl pk-yrs)    Types: Pipe, Cigarettes    Start date:     Quit date: 1985    Years since quittin.8   Smokeless Tobacco Never       Home Medications:   Prior to Admission medications    Medication Sig Start Date End Date Taking? Authorizing Provider   acetaminophen (TYLENOL) 650 mg CR tablet Take 650 mg by mouth every 8 (eight) hours as needed for mild pain For arthritis pain   Yes Historical Provider, MD   alfuzosin (UROXATRAL) 10 mg 24 hr tablet TAKE 1 TABLET BY MOUTH DAILY 3/17/25  Yes KAI Vegas   amLODIPine (NORVASC) 10 mg tablet TAKE 1 TABLET BY MOUTH EVERY DAY 24  Yes Marvin Meadows MD   metoprolol succinate (TOPROL-XL) 25 mg 24 hr tablet Take 1 tablet (25 mg total) by mouth daily In AM 25  Yes Michael Maier MD   Multiple Vitamin (multivitamin) tablet Take 1 tablet by mouth daily   Yes Historical Provider, MD       Allergies:  No Known Allergies    Review of Systems:     Review of Systems   Constitutional:  Positive for activity change. Negative for chills and fever.   HENT:  Negative for ear pain and sore throat.    Eyes:  Negative for pain and visual disturbance.   Respiratory:  Positive for shortness of breath. Negative for cough.    Cardiovascular:  Negative for chest pain and palpitations.   Gastrointestinal:  Negative for abdominal pain and vomiting.   Genitourinary:  Negative for dysuria and  "hematuria.   Musculoskeletal:  Negative for arthralgias and back pain.   Skin:  Negative for color change and rash.   Neurological:  Negative for seizures and syncope.   All other systems reviewed and are negative.      Vital Signs:     Vitals:    03/21/25 1006 03/21/25 1008   BP: 145/77 150/71   BP Location: Left arm Right arm   Patient Position: Sitting Sitting   Cuff Size: Standard Standard   Pulse: 65    SpO2: 99%    Weight: 71.1 kg (156 lb 12.8 oz)    Height: 5' 4\" (1.626 m)        Physical Exam:     Physical Exam  Vitals reviewed.   Constitutional:       Appearance: Normal appearance.   HENT:      Head: Normocephalic and atraumatic.   Eyes:      Pupils: Pupils are equal, round, and reactive to light.   Neck:      Vascular: No carotid bruit or JVD.   Cardiovascular:      Rate and Rhythm: Normal rate and regular rhythm.      Pulses:           Carotid pulses are 2+ on the right side and 2+ on the left side.       Radial pulses are 2+ on the right side and 2+ on the left side.        Dorsalis pedis pulses are 2+ on the right side and 2+ on the left side.      Heart sounds: Murmur heard.      Systolic murmur is present with a grade of 3/6.      No friction rub. No gallop.   Pulmonary:      Effort: Pulmonary effort is normal.      Breath sounds: Normal breath sounds. No wheezing, rhonchi or rales.   Abdominal:      Palpations: Abdomen is soft.      Tenderness: There is no abdominal tenderness.   Musculoskeletal:      Cervical back: Normal range of motion.      Right lower leg: No edema.      Left lower leg: No edema.   Skin:     General: Skin is warm and dry.      Capillary Refill: Capillary refill takes less than 2 seconds.   Neurological:      General: No focal deficit present.      Mental Status: He is alert.      Sensory: Sensation is intact.   Psychiatric:         Attention and Perception: Attention normal.         Mood and Affect: Mood normal.         Behavior: Behavior normal. Behavior is cooperative. " "      Lab Results:               Invalid input(s): \"LABGLOM\"      No results found for: \"HGBA1C\"  No results found for: \"CKTOTAL\", \"CKMB\", \"CKMBINDEX\", \"TROPONINI\"    Imaging Studies:     Echocardiogram: 2/13/25  Left Ventricle Left ventricular cavity size is normal. Wall thickness is moderately increased. There is moderate asymmetric hypertrophy of the septal wall. The left ventricular ejection fraction is 55%. Systolic function is normal. Wall motion is normal. Diastolic function is mildly abnormal, consistent with grade I (abnormal) relaxation.   Right Ventricle Right ventricular cavity size is normal. Systolic function is normal. Wall thickness is normal.   Left Atrium The atrium is mildly dilated.   Right Atrium The atrium is normal in size.   Aortic Valve The aortic valve is trileaflet. The leaflets are moderately thickened. The leaflets are moderately calcified. There is moderately reduced mobility. There is no evidence of regurgitation. There is low flow, paradoxical severe aortic stenosis. The aortic valve peak velocity is 2.7 m/s. The aortic valve mean gradient is 19 mmHg. The dimensionless velocity index is 0.30. The aortic valve area is 0.86 cm2. The stroke volume index is 27.20 ml/m2.   Mitral Valve The leaflets exhibit normal mobility. There is mild annular calcification.  There is mild regurgitation. There is no evidence of stenosis.   Tricuspid Valve Tricuspid valve structure is normal. There is mild regurgitation. There is no evidence of stenosis.   Pulmonic Valve Pulmonic valve structure is normal. There is no evidence of regurgitation. There is no evidence of stenosis.   Ascending Aorta The aortic root is normal in size. The aortic root exhibited mild fibrocalcific change.   Pericardium There is no pericardial effusion. The pericardium is normal in appearance.       Results Review Statement: I personally reviewed the following image studies in PACS and associated radiology reports: Echocardiogram. " My interpretation of the radiology images/reports is: agree with above interpretation.    TAVR evaluation Assessment:     [facility]: III    STS Risk Score: 4.39 %, mortality risk    Aortic Stenosis Stage: D2    5 Meter Walk Test:      Attempt 1: 7   Attempt 2: 6   Attempt 3: 6    KCCQ-12 completed        Assessment:  Patient Active Problem List    Diagnosis Date Noted    Moderate low-flow aortic stenosis 01/20/2025    Dyspnea on exertion 01/20/2025    Peripheral vascular disease (HCC) 09/13/2024    Depression, recurrent (HCC) 02/13/2023    CKD (chronic kidney disease) stage 3, GFR 30-59 ml/min (HCC) 02/07/2022    Rectal bleeding 03/20/2019     Severe aortic stenosis; Ongoing TAVR workup    Plan:    Christian Jones has severe symptomatic aortic stenosis. Based on their STS risk assessment, they will undergo the following testing for transcatheter aortic valve replacement: gated CTA of the chest, abdomen, and pelvis, cardiac catheterization, dental clearance, and carotid ultrasound.    Once these studies have been completed, Christian Jones will follow up in our office to review the results and confirm the suitability of proceeding with transcatheter aortic valve replacment.    Shared decision-making encounter occurred during this visit. Additionally, heart team evaluation of suitability for surgical replacement has been completed.      Christian Jones was comfortable with our recommendations, and their questions were answered to their satisfaction.  We will continue to evaluate the patient, with a final surgical recommendation pending the above work up.  Thank you for allowing us to participate in the care of this patient.     Routine referral to gastroenterology for colonoscopy screening was not indicated, as the patient is over 76 years old    SIGNATURE: Jennifer Goss PA-C  DATE: March 21, 2025  TIME: 10:10 AM    * This note was completed in part utilizing m-modal fluency direct voice recognition software.    Grammatical errors, random word insertion, spelling mistakes, and incomplete sentences may be an occasional consequence of the system secondary to software limitations, ambient noise and hardware issues. At the time of dictation, efforts were made to edit, clarify and /or correct errors. Please read the chart carefully and recognize, using context, where substitutions have occurred.  If you have any questions or concerns about the context, text or information contained within the body of this dictation, please contact myself, the provider, for further clarification.

## 2025-03-24 NOTE — TELEPHONE ENCOUNTER
This is Christian Casey. The phone number is [phone]. I'm calling got a message that I was supposed to call about ACT SCAN. OK, call me back at this number.    Patient scheduled for RIGHT & LEFT  Heart Cath on 4/15/25  at [facility] with Dr. DOAN.      Instructions sent to patient through [facility].     Patient aware of all general instructions.    Medication holds:     NO MED HOLDS     Labs to be done NO LATER THAN 4/8/25   CMP / CBC (fasting 8 hours)        Dr. Maier, can you please place prep for procedure.   Thank you,   Lizette

## 2025-04-02 ENCOUNTER — HOSPITAL ENCOUNTER (OUTPATIENT)
Dept: NON INVASIVE DIAGNOSTICS | Facility: HOSPITAL | Age: 89
Discharge: HOME | End: 2025-04-02
Payer: COMMERCIAL

## 2025-04-02 ENCOUNTER — HOSPITAL ENCOUNTER (OUTPATIENT)
Dept: RADIOLOGY | Facility: HOSPITAL | Age: 89
Discharge: HOME | End: 2025-04-02
Payer: COMMERCIAL

## 2025-04-02 DIAGNOSIS — R06.09 DYSPNEA ON EXERTION: ICD-10-CM

## 2025-04-02 DIAGNOSIS — I35.0 NONRHEUMATIC AORTIC VALVE STENOSIS: ICD-10-CM

## 2025-04-02 DIAGNOSIS — Z13.6 ENCOUNTER FOR SCREENING FOR STENOSIS OF CAROTID ARTERY: ICD-10-CM

## 2025-04-02 PROCEDURE — 93880 EXTRACRANIAL BILAT STUDY: CPT | Performed by: SURGERY

## 2025-04-02 PROCEDURE — 93880 EXTRACRANIAL BILAT STUDY: CPT

## 2025-04-02 PROCEDURE — 75572 CT HRT W/3D IMAGE: CPT

## 2025-04-02 PROCEDURE — 74174 CTA ABD&PLVS W/CONTRAST: CPT

## 2025-04-02 RX ADMIN — IOHEXOL 85 ML: 350 INJECTION, SOLUTION INTRAVENOUS at 10:19

## 2025-04-07 ENCOUNTER — PREP FOR PROCEDURE (OUTPATIENT)
Dept: CARDIOLOGY CLINIC | Facility: CLINIC | Age: 89
End: 2025-04-07

## 2025-04-07 DIAGNOSIS — I35.0 MODERATE AORTIC STENOSIS: ICD-10-CM

## 2025-04-07 DIAGNOSIS — I47.29 NSVT (NONSUSTAINED VENTRICULAR TACHYCARDIA) (HCC): Primary | ICD-10-CM

## 2025-04-07 DIAGNOSIS — R06.09 DYSPNEA ON EXERTION: ICD-10-CM

## 2025-04-09 ENCOUNTER — APPOINTMENT (OUTPATIENT)
Dept: LAB | Facility: CLINIC | Age: 89
End: 2025-04-09
Payer: COMMERCIAL

## 2025-04-09 DIAGNOSIS — I47.29 NSVT (NONSUSTAINED VENTRICULAR TACHYCARDIA) (HCC): ICD-10-CM

## 2025-04-09 DIAGNOSIS — R06.09 DYSPNEA ON EXERTION: ICD-10-CM

## 2025-04-09 DIAGNOSIS — I35.0 MODERATE AORTIC STENOSIS: ICD-10-CM

## 2025-04-09 LAB
ALBUMIN SERPL BCG-MCNC: 4.2 G/DL (ref 3.5–5)
ALP SERPL-CCNC: 69 U/L (ref 34–104)
ALT SERPL W P-5'-P-CCNC: 17 U/L (ref 7–52)
ANION GAP SERPL CALCULATED.3IONS-SCNC: 9 MMOL/L (ref 4–13)
AST SERPL W P-5'-P-CCNC: 14 U/L (ref 13–39)
BASOPHILS # BLD AUTO: 0.05 THOUSANDS/ÂΜL (ref 0–0.1)
BASOPHILS NFR BLD AUTO: 1 % (ref 0–1)
BILIRUB SERPL-MCNC: 0.53 MG/DL (ref 0.2–1)
BUN SERPL-MCNC: 26 MG/DL (ref 5–25)
CALCIUM SERPL-MCNC: 8.9 MG/DL (ref 8.4–10.2)
CHLORIDE SERPL-SCNC: 105 MMOL/L (ref 96–108)
CO2 SERPL-SCNC: 24 MMOL/L (ref 21–32)
CREAT SERPL-MCNC: 1.44 MG/DL (ref 0.6–1.3)
EOSINOPHIL # BLD AUTO: 0.36 THOUSAND/ÂΜL (ref 0–0.61)
EOSINOPHIL NFR BLD AUTO: 5 % (ref 0–6)
ERYTHROCYTE [DISTWIDTH] IN BLOOD BY AUTOMATED COUNT: 14.2 % (ref 11.6–15.1)
GFR SERPL CREATININE-BSD FRML MDRD: 43 ML/MIN/1.73SQ M
GLUCOSE P FAST SERPL-MCNC: 122 MG/DL (ref 65–99)
HCT VFR BLD AUTO: 42.7 % (ref 36.5–49.3)
HGB BLD-MCNC: 13.8 G/DL (ref 12–17)
IMM GRANULOCYTES # BLD AUTO: 0.03 THOUSAND/UL (ref 0–0.2)
IMM GRANULOCYTES NFR BLD AUTO: 0 % (ref 0–2)
LYMPHOCYTES # BLD AUTO: 2.22 THOUSANDS/ÂΜL (ref 0.6–4.47)
LYMPHOCYTES NFR BLD AUTO: 29 % (ref 14–44)
MCH RBC QN AUTO: 28.9 PG (ref 26.8–34.3)
MCHC RBC AUTO-ENTMCNC: 32.3 G/DL (ref 31.4–37.4)
MCV RBC AUTO: 90 FL (ref 82–98)
MONOCYTES # BLD AUTO: 0.8 THOUSAND/ÂΜL (ref 0.17–1.22)
MONOCYTES NFR BLD AUTO: 10 % (ref 4–12)
NEUTROPHILS # BLD AUTO: 4.28 THOUSANDS/ÂΜL (ref 1.85–7.62)
NEUTS SEG NFR BLD AUTO: 55 % (ref 43–75)
NRBC BLD AUTO-RTO: 0 /100 WBCS
PLATELET # BLD AUTO: 208 THOUSANDS/UL (ref 149–390)
PMV BLD AUTO: 12 FL (ref 8.9–12.7)
POTASSIUM SERPL-SCNC: 4.5 MMOL/L (ref 3.5–5.3)
PROT SERPL-MCNC: 6.9 G/DL (ref 6.4–8.4)
RBC # BLD AUTO: 4.77 MILLION/UL (ref 3.88–5.62)
SODIUM SERPL-SCNC: 138 MMOL/L (ref 135–147)
WBC # BLD AUTO: 7.74 THOUSAND/UL (ref 4.31–10.16)

## 2025-04-09 PROCEDURE — 85025 COMPLETE CBC W/AUTO DIFF WBC: CPT

## 2025-04-09 PROCEDURE — 36415 COLL VENOUS BLD VENIPUNCTURE: CPT

## 2025-04-09 PROCEDURE — 80053 COMPREHEN METABOLIC PANEL: CPT

## 2025-04-11 ENCOUNTER — HOSPITAL ENCOUNTER (OUTPATIENT)
Facility: HOSPITAL | Age: 89
Setting detail: OBSERVATION
Discharge: HOME | End: 2025-04-12
Admitting: INTERNAL MEDICINE
Payer: COMMERCIAL

## 2025-04-11 ENCOUNTER — APPOINTMENT (OUTPATIENT)
Dept: RADIOLOGY | Facility: HOSPITAL | Age: 89
End: 2025-04-11
Payer: COMMERCIAL

## 2025-04-11 ENCOUNTER — APPOINTMENT (OUTPATIENT)
Dept: NON INVASIVE DIAGNOSTICS | Facility: HOSPITAL | Age: 89
End: 2025-04-11
Payer: COMMERCIAL

## 2025-04-11 ENCOUNTER — APPOINTMENT (EMERGENCY)
Dept: CT IMAGING | Facility: HOSPITAL | Age: 89
End: 2025-04-11
Payer: COMMERCIAL

## 2025-04-11 DIAGNOSIS — R42 DIZZINESS: Primary | ICD-10-CM

## 2025-04-11 DIAGNOSIS — J81.1 PULMONARY EDEMA: ICD-10-CM

## 2025-04-11 DIAGNOSIS — R94.31 ABNORMAL EKG: ICD-10-CM

## 2025-04-11 DIAGNOSIS — K21.9 GERD (GASTROESOPHAGEAL REFLUX DISEASE): ICD-10-CM

## 2025-04-11 DIAGNOSIS — R29.90 STROKE-LIKE SYMPTOMS: ICD-10-CM

## 2025-04-11 PROBLEM — I10 ESSENTIAL HYPERTENSION: Status: ACTIVE | Noted: 2025-04-11

## 2025-04-11 PROBLEM — I47.29 NSVT (NONSUSTAINED VENTRICULAR TACHYCARDIA) (HCC): Status: ACTIVE | Noted: 2025-04-11

## 2025-04-11 LAB
2HR DELTA HS TROPONIN: 1 NG/L
4HR DELTA HS TROPONIN: 6 NG/L
ANION GAP SERPL CALCULATED.3IONS-SCNC: 12 MMOL/L (ref 4–13)
AORTIC VALVE MEAN VELOCITY: 24.2 M/S
APTT PPP: 31 SECONDS (ref 23–34)
ASCENDING AORTA: 3.5 CM
ATRIAL RATE: 105 BPM
ATRIAL RATE: 91 BPM
ATRIAL RATE: 96 BPM
AV AREA BY CONTINUOUS VTI: 0.6 CM2
AV AREA PEAK VELOCITY: 0.7 CM2
AV LVOT MEAN GRADIENT: 1 MMHG
AV LVOT PEAK GRADIENT: 3 MMHG
AV MEAN PRESS GRAD SYS DOP V1V2: 26 MMHG
AV ORIFICE AREA US: 0.64 CM2
AV PEAK GRADIENT: 42 MMHG
AV VELOCITY RATIO: 0.22
AV VMAX SYS DOP: 3.24 M/S
BASOPHILS # BLD AUTO: 0.03 THOUSANDS/ÂΜL (ref 0–0.1)
BASOPHILS NFR BLD AUTO: 0 % (ref 0–1)
BNP SERPL-MCNC: 158 PG/ML (ref 0–100)
BSA FOR ECHO PROCEDURE: 1.76 M2
BUN SERPL-MCNC: 31 MG/DL (ref 5–25)
CALCIUM SERPL-MCNC: 8.9 MG/DL (ref 8.4–10.2)
CARDIAC TROPONIN I PNL SERPL HS: 10 NG/L (ref ?–50)
CARDIAC TROPONIN I PNL SERPL HS: 15 NG/L (ref ?–50)
CARDIAC TROPONIN I PNL SERPL HS: 9 NG/L (ref ?–50)
CHLORIDE SERPL-SCNC: 108 MMOL/L (ref 96–108)
CHOLEST SERPL-MCNC: 169 MG/DL (ref ?–200)
CO2 SERPL-SCNC: 19 MMOL/L (ref 21–32)
CREAT SERPL-MCNC: 1.42 MG/DL (ref 0.6–1.3)
DME PARACHUTE DELIVERY DATE REQUESTED: NORMAL
DME PARACHUTE ITEM DESCRIPTION: NORMAL
DME PARACHUTE ORDER STATUS: NORMAL
DME PARACHUTE SUPPLIER NAME: NORMAL
DME PARACHUTE SUPPLIER PHONE: NORMAL
DOP CALC AO VTI: 73.71 CM
DOP CALC LVOT AREA: 2.83 CM2
DOP CALC LVOT CARDIAC INDEX: 2.32 L/MIN/M2
DOP CALC LVOT CARDIAC OUTPUT: 4.08 L/MIN
DOP CALC LVOT DIAMETER: 1.9 CM
DOP CALC LVOT PEAK VEL VTI: 16.58 CM
DOP CALC LVOT PEAK VEL: 0.84 M/S
DOP CALC LVOT STROKE INDEX: 26.7 ML/M2
DOP CALC LVOT STROKE VOLUME: 47
EOSINOPHIL # BLD AUTO: 0.04 THOUSAND/ÂΜL (ref 0–0.61)
EOSINOPHIL NFR BLD AUTO: 0 % (ref 0–6)
ERYTHROCYTE [DISTWIDTH] IN BLOOD BY AUTOMATED COUNT: 14.1 % (ref 11.6–15.1)
ERYTHROCYTE [DISTWIDTH] IN BLOOD BY AUTOMATED COUNT: 14.1 % (ref 11.6–15.1)
EST. AVERAGE GLUCOSE BLD GHB EST-MCNC: 126 MG/DL
GFR SERPL CREATININE-BSD FRML MDRD: 43 ML/MIN/1.73SQ M
GLUCOSE SERPL-MCNC: 163 MG/DL (ref 65–140)
GLUCOSE SERPL-MCNC: 184 MG/DL (ref 65–140)
HBA1C MFR BLD: 6 %
HCT VFR BLD AUTO: 39.1 % (ref 36.5–49.3)
HCT VFR BLD AUTO: 41.5 % (ref 36.5–49.3)
HDLC SERPL-MCNC: 45 MG/DL
HGB BLD-MCNC: 12.8 G/DL (ref 12–17)
HGB BLD-MCNC: 13.6 G/DL (ref 12–17)
IMM GRANULOCYTES # BLD AUTO: 0.04 THOUSAND/UL (ref 0–0.2)
IMM GRANULOCYTES NFR BLD AUTO: 0 % (ref 0–2)
INR PPP: 1.06 (ref 0.85–1.19)
LDLC SERPL CALC-MCNC: 104 MG/DL (ref 0–100)
LV EF US.2D.A4C+ESTIMATED: 55 %
LYMPHOCYTES # BLD AUTO: 0.59 THOUSANDS/ÂΜL (ref 0.6–4.47)
LYMPHOCYTES NFR BLD AUTO: 5 % (ref 14–44)
MCH RBC QN AUTO: 28.8 PG (ref 26.8–34.3)
MCH RBC QN AUTO: 29.5 PG (ref 26.8–34.3)
MCHC RBC AUTO-ENTMCNC: 32.7 G/DL (ref 31.4–37.4)
MCHC RBC AUTO-ENTMCNC: 32.8 G/DL (ref 31.4–37.4)
MCV RBC AUTO: 88 FL (ref 82–98)
MCV RBC AUTO: 90 FL (ref 82–98)
MONOCYTES # BLD AUTO: 0.62 THOUSAND/ÂΜL (ref 0.17–1.22)
MONOCYTES NFR BLD AUTO: 5 % (ref 4–12)
NEUTROPHILS # BLD AUTO: 10.61 THOUSANDS/ÂΜL (ref 1.85–7.62)
NEUTS SEG NFR BLD AUTO: 90 % (ref 43–75)
NRBC BLD AUTO-RTO: 0 /100 WBCS
P AXIS: 56 DEGREES
P AXIS: 57 DEGREES
PLATELET # BLD AUTO: 178 THOUSANDS/UL (ref 149–390)
PLATELET # BLD AUTO: 199 THOUSANDS/UL (ref 149–390)
PMV BLD AUTO: 11 FL (ref 8.9–12.7)
PMV BLD AUTO: 11.1 FL (ref 8.9–12.7)
POTASSIUM SERPL-SCNC: 3.6 MMOL/L (ref 3.5–5.3)
PR INTERVAL: 152 MS
PR INTERVAL: 208 MS
PR INTERVAL: 212 MS
PROTHROMBIN TIME: 14 SECONDS (ref 12.3–15)
QRS AXIS: 86 DEGREES
QRS AXIS: 87 DEGREES
QRS AXIS: 90 DEGREES
QRSD INTERVAL: 80 MS
QRSD INTERVAL: 90 MS
QRSD INTERVAL: 90 MS
QT INTERVAL: 364 MS
QT INTERVAL: 382 MS
QT INTERVAL: 390 MS
QTC INTERVAL: 469 MS
QTC INTERVAL: 481 MS
QTC INTERVAL: 492 MS
RBC # BLD AUTO: 4.34 MILLION/UL (ref 3.88–5.62)
RBC # BLD AUTO: 4.72 MILLION/UL (ref 3.88–5.62)
SL CV LV EF: 65
SODIUM SERPL-SCNC: 139 MMOL/L (ref 135–147)
T WAVE AXIS: -50 DEGREES
T WAVE AXIS: -51 DEGREES
T WAVE AXIS: -65 DEGREES
TR MAX PG: 23 MMHG
TR PEAK VELOCITY: 2.4 M/S
TRICUSPID VALVE PEAK REGURGITATION VELOCITY: 2.38 M/S
TRIGL SERPL-MCNC: 100 MG/DL (ref ?–150)
VENTRICULAR RATE: 105 BPM
VENTRICULAR RATE: 91 BPM
VENTRICULAR RATE: 96 BPM
WBC # BLD AUTO: 10.85 THOUSAND/UL (ref 4.31–10.16)
WBC # BLD AUTO: 11.93 THOUSAND/UL (ref 4.31–10.16)

## 2025-04-11 PROCEDURE — 93010 ELECTROCARDIOGRAM REPORT: CPT | Performed by: INTERNAL MEDICINE

## 2025-04-11 PROCEDURE — 93308 TTE F-UP OR LMTD: CPT

## 2025-04-11 PROCEDURE — 70498 CT ANGIOGRAPHY NECK: CPT

## 2025-04-11 PROCEDURE — 85027 COMPLETE CBC AUTOMATED: CPT

## 2025-04-11 PROCEDURE — 99285 EMERGENCY DEPT VISIT HI MDM: CPT

## 2025-04-11 PROCEDURE — 85025 COMPLETE CBC W/AUTO DIFF WBC: CPT

## 2025-04-11 PROCEDURE — 93325 DOPPLER ECHO COLOR FLOW MAPG: CPT | Performed by: INTERNAL MEDICINE

## 2025-04-11 PROCEDURE — 93325 DOPPLER ECHO COLOR FLOW MAPG: CPT

## 2025-04-11 PROCEDURE — 85730 THROMBOPLASTIN TIME PARTIAL: CPT

## 2025-04-11 PROCEDURE — 84484 ASSAY OF TROPONIN QUANT: CPT

## 2025-04-11 PROCEDURE — 99223 1ST HOSP IP/OBS HIGH 75: CPT | Performed by: INTERNAL MEDICINE

## 2025-04-11 PROCEDURE — 83880 ASSAY OF NATRIURETIC PEPTIDE: CPT

## 2025-04-11 PROCEDURE — 70496 CT ANGIOGRAPHY HEAD: CPT

## 2025-04-11 PROCEDURE — 93321 DOPPLER ECHO F-UP/LMTD STD: CPT | Performed by: INTERNAL MEDICINE

## 2025-04-11 PROCEDURE — 97167 OT EVAL HIGH COMPLEX 60 MIN: CPT

## 2025-04-11 PROCEDURE — 97163 PT EVAL HIGH COMPLEX 45 MIN: CPT

## 2025-04-11 PROCEDURE — 80061 LIPID PANEL: CPT

## 2025-04-11 PROCEDURE — 99204 OFFICE O/P NEW MOD 45 MIN: CPT | Performed by: PSYCHIATRY & NEUROLOGY

## 2025-04-11 PROCEDURE — 82948 REAGENT STRIP/BLOOD GLUCOSE: CPT

## 2025-04-11 PROCEDURE — 71045 X-RAY EXAM CHEST 1 VIEW: CPT

## 2025-04-11 PROCEDURE — 80048 BASIC METABOLIC PNL TOTAL CA: CPT

## 2025-04-11 PROCEDURE — 83735 ASSAY OF MAGNESIUM: CPT

## 2025-04-11 PROCEDURE — 83036 HEMOGLOBIN GLYCOSYLATED A1C: CPT

## 2025-04-11 PROCEDURE — 85610 PROTHROMBIN TIME: CPT

## 2025-04-11 PROCEDURE — 93308 TTE F-UP OR LMTD: CPT | Performed by: INTERNAL MEDICINE

## 2025-04-11 PROCEDURE — 93005 ELECTROCARDIOGRAM TRACING: CPT

## 2025-04-11 PROCEDURE — 92610 EVALUATE SWALLOWING FUNCTION: CPT

## 2025-04-11 PROCEDURE — 36415 COLL VENOUS BLD VENIPUNCTURE: CPT

## 2025-04-11 PROCEDURE — 93321 DOPPLER ECHO F-UP/LMTD STD: CPT

## 2025-04-11 PROCEDURE — 96374 THER/PROPH/DIAG INJ IV PUSH: CPT

## 2025-04-11 PROCEDURE — 99214 OFFICE O/P EST MOD 30 MIN: CPT | Performed by: INTERNAL MEDICINE

## 2025-04-11 RX ORDER — HEPARIN SODIUM 5000 [USP'U]/ML
5000 INJECTION, SOLUTION INTRAVENOUS; SUBCUTANEOUS EVERY 8 HOURS SCHEDULED
Status: DISCONTINUED | OUTPATIENT
Start: 2025-04-11 | End: 2025-04-12 | Stop reason: HOSPADM

## 2025-04-11 RX ORDER — TAMSULOSIN HYDROCHLORIDE 0.4 MG/1
0.4 CAPSULE ORAL
Status: DISCONTINUED | OUTPATIENT
Start: 2025-04-11 | End: 2025-04-12 | Stop reason: HOSPADM

## 2025-04-11 RX ORDER — MECLIZINE HCL 12.5 MG 12.5 MG/1
25 TABLET ORAL EVERY 8 HOURS PRN
Status: DISCONTINUED | OUTPATIENT
Start: 2025-04-11 | End: 2025-04-12 | Stop reason: HOSPADM

## 2025-04-11 RX ORDER — ASPIRIN 81 MG/1
81 TABLET, CHEWABLE ORAL DAILY
Status: DISCONTINUED | OUTPATIENT
Start: 2025-04-11 | End: 2025-04-12 | Stop reason: HOSPADM

## 2025-04-11 RX ORDER — PANTOPRAZOLE SODIUM 40 MG/10ML
40 INJECTION, POWDER, LYOPHILIZED, FOR SOLUTION INTRAVENOUS EVERY 12 HOURS SCHEDULED
Status: DISCONTINUED | OUTPATIENT
Start: 2025-04-11 | End: 2025-04-12

## 2025-04-11 RX ORDER — ACETAMINOPHEN 325 MG/1
650 TABLET ORAL EVERY 6 HOURS PRN
Status: DISCONTINUED | OUTPATIENT
Start: 2025-04-11 | End: 2025-04-12 | Stop reason: HOSPADM

## 2025-04-11 RX ORDER — FUROSEMIDE 10 MG/ML
40 INJECTION INTRAMUSCULAR; INTRAVENOUS ONCE
Status: COMPLETED | OUTPATIENT
Start: 2025-04-11 | End: 2025-04-11

## 2025-04-11 RX ORDER — METOPROLOL SUCCINATE 25 MG/1
25 TABLET, EXTENDED RELEASE ORAL DAILY
Status: DISCONTINUED | OUTPATIENT
Start: 2025-04-11 | End: 2025-04-12 | Stop reason: HOSPADM

## 2025-04-11 RX ORDER — AMLODIPINE BESYLATE 10 MG/1
10 TABLET ORAL DAILY
Status: DISCONTINUED | OUTPATIENT
Start: 2025-04-11 | End: 2025-04-12

## 2025-04-11 RX ORDER — ASPIRIN 81 MG/1
324 TABLET, CHEWABLE ORAL ONCE
Status: COMPLETED | OUTPATIENT
Start: 2025-04-11 | End: 2025-04-11

## 2025-04-11 RX ORDER — ONDANSETRON 2 MG/ML
4 INJECTION INTRAMUSCULAR; INTRAVENOUS ONCE
Status: COMPLETED | OUTPATIENT
Start: 2025-04-11 | End: 2025-04-11

## 2025-04-11 RX ORDER — PANTOPRAZOLE SODIUM 40 MG/10ML
40 INJECTION, POWDER, LYOPHILIZED, FOR SOLUTION INTRAVENOUS EVERY 12 HOURS SCHEDULED
Status: DISCONTINUED | OUTPATIENT
Start: 2025-04-11 | End: 2025-04-11

## 2025-04-11 RX ORDER — ATORVASTATIN CALCIUM 40 MG/1
40 TABLET, FILM COATED ORAL EVERY EVENING
Status: DISCONTINUED | OUTPATIENT
Start: 2025-04-11 | End: 2025-04-12 | Stop reason: HOSPADM

## 2025-04-11 RX ORDER — ENOXAPARIN SODIUM 100 MG/ML
40 INJECTION SUBCUTANEOUS DAILY
Status: DISCONTINUED | OUTPATIENT
Start: 2025-04-11 | End: 2025-04-11

## 2025-04-11 RX ORDER — FUROSEMIDE 10 MG/ML
40 INJECTION INTRAMUSCULAR; INTRAVENOUS ONCE
Status: DISCONTINUED | OUTPATIENT
Start: 2025-04-11 | End: 2025-04-11

## 2025-04-11 RX ORDER — IPRATROPIUM BROMIDE AND ALBUTEROL SULFATE 2.5; .5 MG/3ML; MG/3ML
3 SOLUTION RESPIRATORY (INHALATION) EVERY 6 HOURS PRN
Status: DISCONTINUED | OUTPATIENT
Start: 2025-04-11 | End: 2025-04-12 | Stop reason: HOSPADM

## 2025-04-11 RX ORDER — POTASSIUM CHLORIDE 1500 MG/1
20 TABLET, EXTENDED RELEASE ORAL ONCE
Status: COMPLETED | OUTPATIENT
Start: 2025-04-11 | End: 2025-04-11

## 2025-04-11 RX ORDER — SODIUM CHLORIDE 9 MG/ML
75 INJECTION, SOLUTION INTRAVENOUS CONTINUOUS
Status: DISPENSED | OUTPATIENT
Start: 2025-04-11 | End: 2025-04-12

## 2025-04-11 RX ORDER — ONDANSETRON 2 MG/ML
4 INJECTION INTRAMUSCULAR; INTRAVENOUS EVERY 6 HOURS PRN
Status: DISCONTINUED | OUTPATIENT
Start: 2025-04-11 | End: 2025-04-12 | Stop reason: HOSPADM

## 2025-04-11 RX ORDER — DIAZEPAM 10 MG/2ML
5 INJECTION, SOLUTION INTRAMUSCULAR; INTRAVENOUS ONCE
Status: COMPLETED | OUTPATIENT
Start: 2025-04-11 | End: 2025-04-11

## 2025-04-11 RX ORDER — MECLIZINE HYDROCHLORIDE 25 MG/1
50 TABLET ORAL ONCE
Status: COMPLETED | OUTPATIENT
Start: 2025-04-11 | End: 2025-04-11

## 2025-04-11 RX ORDER — METOCLOPRAMIDE HYDROCHLORIDE 5 MG/ML
10 INJECTION INTRAMUSCULAR; INTRAVENOUS EVERY 6 HOURS PRN
Status: DISCONTINUED | OUTPATIENT
Start: 2025-04-11 | End: 2025-04-12 | Stop reason: HOSPADM

## 2025-04-11 RX ADMIN — PANTOPRAZOLE SODIUM 40 MG: 40 INJECTION, POWDER, LYOPHILIZED, FOR SOLUTION INTRAVENOUS at 06:25

## 2025-04-11 RX ADMIN — DIAZEPAM 5 MG: 10 INJECTION, SOLUTION INTRAMUSCULAR; INTRAVENOUS at 06:06

## 2025-04-11 RX ADMIN — ONDANSETRON 4 MG: 2 INJECTION INTRAMUSCULAR; INTRAVENOUS at 22:39

## 2025-04-11 RX ADMIN — ASPIRIN 324 MG: 81 TABLET, CHEWABLE ORAL at 04:19

## 2025-04-11 RX ADMIN — HEPARIN SODIUM 5000 UNITS: 5000 INJECTION INTRAVENOUS; SUBCUTANEOUS at 16:37

## 2025-04-11 RX ADMIN — PANTOPRAZOLE SODIUM 40 MG: 40 INJECTION, POWDER, LYOPHILIZED, FOR SOLUTION INTRAVENOUS at 21:14

## 2025-04-11 RX ADMIN — TAMSULOSIN HYDROCHLORIDE 0.4 MG: 0.4 CAPSULE ORAL at 16:37

## 2025-04-11 RX ADMIN — METOPROLOL SUCCINATE 25 MG: 25 TABLET, EXTENDED RELEASE ORAL at 08:38

## 2025-04-11 RX ADMIN — MECLIZINE HYDROCHLORIDE 50 MG: 25 TABLET ORAL at 04:05

## 2025-04-11 RX ADMIN — ASPIRIN 81 MG: 81 TABLET, CHEWABLE ORAL at 08:38

## 2025-04-11 RX ADMIN — IOHEXOL 90 ML: 350 INJECTION, SOLUTION INTRAVENOUS at 03:45

## 2025-04-11 RX ADMIN — ONDANSETRON 4 MG: 2 INJECTION INTRAMUSCULAR; INTRAVENOUS at 03:54

## 2025-04-11 RX ADMIN — POTASSIUM CHLORIDE 20 MEQ: 1500 TABLET, EXTENDED RELEASE ORAL at 08:38

## 2025-04-11 RX ADMIN — METOCLOPRAMIDE 10 MG: 5 INJECTION, SOLUTION INTRAMUSCULAR; INTRAVENOUS at 05:57

## 2025-04-11 RX ADMIN — METOCLOPRAMIDE 10 MG: 5 INJECTION, SOLUTION INTRAMUSCULAR; INTRAVENOUS at 16:37

## 2025-04-11 RX ADMIN — FUROSEMIDE 40 MG: 10 INJECTION, SOLUTION INTRAVENOUS at 08:39

## 2025-04-11 RX ADMIN — SODIUM CHLORIDE 75 ML/HR: 0.9 INJECTION, SOLUTION INTRAVENOUS at 12:07

## 2025-04-11 RX ADMIN — HEPARIN SODIUM 5000 UNITS: 5000 INJECTION INTRAVENOUS; SUBCUTANEOUS at 06:40

## 2025-04-11 RX ADMIN — ONDANSETRON 4 MG: 2 INJECTION INTRAMUSCULAR; INTRAVENOUS at 09:29

## 2025-04-11 RX ADMIN — HEPARIN SODIUM 5000 UNITS: 5000 INJECTION INTRAVENOUS; SUBCUTANEOUS at 21:14

## 2025-04-11 RX ADMIN — ATORVASTATIN CALCIUM 40 MG: 40 TABLET, FILM COATED ORAL at 17:50

## 2025-04-11 NOTE — OCCUPATIONAL THERAPY NOTE
Occupational Therapy Evaluation     Patient Name: Christian Jones  Today's Date: 4/11/2025  Problem List  Principal Problem:    Stroke-like symptoms  Active Problems:    CKD (chronic kidney disease) stage 3, GFR 30-59 ml/min ([facility])    Aortic stenosis    Essential hypertension    Abnormal EKG    NSVT (nonsustained ventricular tachycardia) ([facility])    Pulmonary edema    Past Medical History  Past Medical History:   Diagnosis Date    Arthritis 1/2000    Basal cell carcinoma     Head    Depression 1/1990    GERD (gastroesophageal reflux disease) 1/1990    Hyperlipemia     Hypertension     Positive fecal occult blood test     Psoriasis     Rectal bleeding     Visual impairment 1/1950    corrected     Past Surgical History  Past Surgical History:   Procedure Laterality Date    BASAL CELL CARCINOMA EXCISION      COLONOSCOPY      COLONOSCOPY  03/28/2019    HERNIA REPAIR  2012    Inguinal    LIPOMA RESECTION      Scalp    SIGMOIDOSCOPY           04/11/25 0955   OT Last Visit   OT Visit Date 04/11/25   Note Type   Note type Evaluation   Pain Assessment   Pain Assessment Tool 0-10   Pain Score No Pain   Restrictions/Precautions   Weight Bearing Precautions Per Order No   Other Precautions Telemetry;Fall Risk;Chair Alarm;Bed Alarm   Home Living   Type of Home House   Home Layout One level;Performs ADLs on one level;Able to live on main level with bedroom/bathroom;Stairs to enter with rails;Other (Comment)  (2 bathrooms on first floor, has basement with a stair glide)   Bathroom Shower/Tub Walk-in shower  (also has tub)   Bathroom Toilet Raised   Bathroom Equipment Shower chair;Grab bars in shower   Home Equipment Walker;Cane;Wheelchair-manual;Stair glide;Other (Comment)  (wife has rollator)   Additional Comments no AD at baseline   Prior Function   Level of [place] Independent with ADLs;Independent with functional mobility;Independent with IADLS   Lives With Spouse   Receives Help From Family   IADLs Independent with  driving;Independent with meal prep;Independent with medication management   Falls in the last 6 months 0   Vocational Retired   Comments pt cares for his wife   Lifestyle   Autonomy PTA, was (I) with ADLs and was (I) with IADLs.  (+) Patient has had 0 falls in the last 6 months. Patient lives in a 1 story home with 2 CHAR with 1 HR .  Pt can live on one level, bedroom on 1st floor, bathroom on 1st floor, tub, walk-in shower, and raised toilet Pt home DME includes: Shower Chair, Grab Bars, stair glide to basement, w/c and SPC.   Reciprocal Relationships spouse   Service to Others retired   General   Additional Pertinent History Comorbidities affecting pt’s functional performance include a significant PMH of:  Basal cell carcinoma, Depression, GERD, Hyperlipemia, Hypertension. Patient with active OT orders and activity orders for Activity as tolerated.   Family/Caregiver Present No   Additional General Comments Co treatment with PT secondary to complex medical condition of pt, possible A of 2 required to achieve and maintain transitional movements, requiring the need of skilled therapeutic intervention of 2 therapists to achieve delivery of services.   Subjective   Subjective Pt reports that his double vision and dizziness continues.   ADL   Where Assessed Edge of bed   Eating Assistance 5  Supervision/Setup   Grooming Assistance 5  Supervision/Setup   UB Bathing Assistance 5  Supervision/Setup   LB Bathing Assistance 5  Supervision/Setup   UB Dressing Assistance 5  Supervision/Setup   LB Dressing Assistance 5  Supervision/Setup   Toileting Assistance  5  Supervision/Setup   Bed Mobility   Supine to Sit 5  Supervision   Additional items Verbal cues;Increased time required;Bedrails;HOB elevated   Sit to Supine 5  Supervision   Additional items Increased time required;Verbal cues;Bedrails   Transfers   Sit to Stand 5  Supervision   Additional items Verbal cues;Increased time required;Bedrails;Other  (RW)   Stand to  Sit 5  Supervision   Additional items Bedrails;Increased time required;Verbal cues;Other;Impulsive  (RW)   Additional Comments leaves RW before sitting.   Functional Mobility   Functional Mobility 5  Supervision   Additional Comments ~15 ft   Additional items Rolling walker   Balance   Static Sitting Fair +   Dynamic Sitting Fair   Static Standing Fair  (RW)   Dynamic Standing Fair -  (RW)   Ambulatory Fair -  (RW)   Activity Tolerance   Activity Tolerance Patient tolerated treatment well;Treatment limited secondary to medical complications (Comment);Other (Comment)  (pt dizziness preventing further progression. BP supine 143/76)   Medical Staff Made Aware PT   Nurse Made Aware RN   RUE Assessment   RUE Assessment WFL   LUE Assessment   LUE Assessment WFL   Hand Function   Gross Motor Coordination Functional   Fine Motor Coordination Functional   Sensation   Light Touch No apparent deficits   Proprioception   Proprioception No apparent deficits   Vision-Basic Assessment   Current Vision Does not wear glasses   Patient Visual Report Diplopia   Vision - Complex Assessment   Ocular Range of Motion Intact   Tracking Impaired  (L rotary nystagmus)   Visual Fields   (intact)   Acuity Able to read employee name badge without difficulty  (could not read calendar)   Psychosocial   Psychosocial (WDL) WDL   Perception   Inattention/Neglect Appears intact   Cognition   Overall Cognitive Status WFL   Arousal/Participation Alert;Cooperative;Responsive   Attention Within functional limits   Orientation Level Oriented X4   Memory Within functional limits   Following Commands Follows all commands and directions without difficulty   Assessment   Limitation Decreased ADL status;Decreased endurance;Decreased self-care trans;Decreased high-level ADLs  (decreased balance, decreased coordination, decreased functional reach)   Prognosis Fair   Assessment Patient is a 89 y.o. year old male seen for OT eval s/p admit to [facility] on 4/11/2025 with  Dizziness, Pulmonary edema, Abnormal EKG, Stroke-like symptoms. OT consulted to assess ADLs/IADLs/functional mobility and assist w/ D/C planning.  Pt's CLOF as follows: eating/grooming: supervision , UB ADLs: supervision , LB ADLs: supervision , toileting: supervision , bed mobility: supervision , functional transfers: supervision , functional mobility: supervision , sitting/standing tolerance: 5 min/ 2 min. Pt would benefit from continued skilled OT while in acute setting to address deficits as defined above and to maximize (I) w/ ADLs/functional mobility. Occupational performance areas to address include: grooming, bathing/shower, toilet hygiene, dressing, medication management, socialization, health maintenance, functional mobility, community mobility, clothing management, cleaning, meal prep, and social participation. Patient demonstrates the following deficits impacting occupational performance: weakness, decreased balance, decreased activity tolerance, limited functional reach, impulsivity, dizziness, nausea, increased body habitus , visual deficits, and decreased cardiovascular endurance. These impairments, as well at pt’s CHAR home environment, limited home support, difficulty performing ADLs, difficulty performing IADLs, difficulty performing transfers/mobility, fall risk , functional decline , new use of AD for functional transfers/mobility, advanced age, and inability to perform caregiver duties , limit pt’s ability to safely engage in all baseline areas of occupation. Based on the aforementioned evaluation, functional performance deficits, and assessments, pt has been identified as a high complexity evaluation. At this time, recommendation for pt to receive post-acute rehabilitation services at a Level III (minimum resource intensity) due to above deficits and CLOF. OT will continue to follow pt 2-3x/wk to address the goals listed below to  w/in 10-14 days.   Goals   Patient Goals to feel better and  go home   LTG Time Frame 10-14   Plan   Treatment Interventions ADL retraining;Functional transfer training;Endurance training;Patient/family training;Equipment evaluation/education;Compensatory technique education;Continued evaluation;Energy conservation;Activityengagement   Goal Expiration Date 04/11/25   OT Treatment Day 0   OT Frequency 2-3x/wk   Discharge Recommendation   Rehab Resource Intensity Level, OT III (Minimum Resource Intensity)   AM-PAC Daily Activity Inpatient   Lower Body Dressing 3   Bathing 3   Toileting 3   Upper Body Dressing 3   Grooming 3   Eating 4   Daily Activity Raw Score 19   Daily Activity Standardized Score (Calc for Raw Score >=11) 40.22   AM-PAC Applied Cognition Inpatient   Following a Speech/Presentation 4   Understanding Ordinary Conversation 4   Taking Medications 4   Remembering Where Things Are Placed or Put Away 4   Remembering List of 4-5 Errands 4   Taking Care of Complicated Tasks 4   Applied Cognition Raw Score 24   Applied Cognition Standardized Score 62.21   End of Consult   Patient Position at End of Consult Supine;Bed/Chair alarm activated;All needs within reach     OT goals to be met in 10-14 days:    Pt will be mod I for UB dressing/bathing to increase independence with ADLs.   Pt will be mod I for LB dressing/bathing using LHAE prn to increase independence with ADLs.    Pt will complete toileting routine w/ Mod I using G safety techniques   Pt will improve dynamic standing balance to GOOD to perform grooming tasks standing at sink   Pt will increase standing activity tolerance to 10 mins to increase engagement in ADLs/IADLs and leisure tasks  Pt will perform bed mobility mod I to increase ADL participation  Pt will improve functional transfers to Mod I, utilizing DME as appropriate, w/ G safety techniques to increase engagement in ADL/IADL tasks  Pt will be Mod I w/ functional mobility,utilizing DME as appropriate, in a home-like environment to improve participation  in ADL/IADLs and leisure activities  Pt will demonstrate G carryover of pt/caregiver education w/ no cues to increase safety w/ functional tasks.  Pt will attend to ongoing cognitive assessment w/ G participation for safe d/c planning   Pt will identify 3 potential falls risk in a home-like environment and with Mod I implement strategies to reduce fall risk  Perla Ramos OTS

## 2025-04-11 NOTE — CONSULTS
Consultation - Neurology   Name: Christian Jones 89 y.o. male I MRN: [id number]  Unit/Bed#: E4 -01 I Date of Admission: 4/11/2025   Date of Service: 4/11/2025 I Hospital Day: 0     Inpatient consult to Neurology  Consult performed by: KAI Romero  Consult ordered by: Christos Hays PA-C      Consult to Neurology  Consult performed by: KAI Romero  Consult ordered by: Hector Reardon DO        Physician Requesting Evaluation: Karen Orantes DO   Reason for Evaluation / Principal Problem: Dizziness    Assessment & Plan  Stroke-like symptoms  88 y/o male with HTN, HLD, arthritis, depression, GERD, who presented to the ED on 4/11 with lightheadedness. In ED, he was noted to have LLE weakness. BP on presentation 128/70. Stroke alert was initiated in ED. NIHSS 2 per ED- LLE weakness and finger to nose ataxia. Patient was deemed not a TNK candidate.    CT head: No acute vascular territorial infarct, hemorrhage, or focal mass effect/midline shift. Chronic microangiopathic changes.   CTA head and neck:  No evidence for acute large vessel occlusive disease.  Short segment high-grade stenosis at the proximal mid intradural right vertebral artery secondary to calcified and noncalcified atheromatous plaque.  Focal moderate stenosis at the right ICA distal supraclinoid segment.  Moderate stenosis at the bilateral ICA origins secondary to calcified and noncalcified atheromatous plaquing, left worse than right (approximately 50 to 60% on the right and 60 to 70% on the left based on NASCET criteria).  Labs: , A1C 6.0    On neurology evaluation today, patient noted to have LUE dysmetria, horizontal nystagmus, difficulty with L eye abduction. Concern for acute infarct; pathway pending for further evaluation.    Plan:  - Stroke pathway  MRI brain  Echo  S/p aspirin 324 mg x 1. Currently on aspirin 81 mg.  Atorvastatin 40 mg  Permissive HTN, labetalol if SBP >200 x 24 hours from symptom onset; then goal  normotension  Continue telemetry  PT/OT/ST  Frequent neuro checks. Continue to monitor and notify neurology with any changes.   -S/p Meclizine 50 mg x 1. Currently on Meclizine 25 mg Q8H PRN.   -Medical management and supportive care per primary team. Correction of any metabolic or infectious disturbances.   -Case and treatment plan reviewed with attending neurologist, Dr. Bustillos. Please see attending attestation for any further recommendations.  CKD (chronic kidney disease) stage 3, GFR 30-59 ml/min ([facility])  Lab Results   Component Value Date    EGFR 43 04/11/2025    EGFR 43 04/09/2025    EGFR 52 02/24/2025    CREATININE 1.42 (H) 04/11/2025    CREATININE 1.44 (H) 04/09/2025    CREATININE 1.22 02/24/2025     -Medical management per primary team  Essential hypertension  -BP on presentation 128/70  -Medical management per primary team      Recommendations for outpatient neurological follow up have yet to be determined.    History of Present Illness   Christian Jones is a 89 y.o.  male with HTN, HLD, arthritis, depression, GERD, who presents with dizziness. Patient presented to the ED with complaints of lightheadedness that he noted when he woke up at approximately 2 am. He also endorsed some nausea and vomiting. In ED, he was noted to have L LE weakness on exam. Stroke alert was initiated in ED. NIHSS per ED 2- L LE weakness and finger to nose ataxia.     Patient seen and evaluated with attending neurologist. Patient reports that he is having dizziness, which resolves when he is completely at rest and recurs after several seconds of movement. He reports having double vision and new onset of L hand tremor.    Review of Systems   A 12 system ROS was completed. Other than the above mentioned complaints in the HPI and those commented on below, all remaining systems were negative.    Medical History Review: I have reviewed the patient's PMH, PSH, Social History, Family History, Meds, and Allergies     Objective :  Temp:   [96.5 °F (35.8 °C)-97 °F (36.1 °C)] 96.8 °F (36 °C)  HR:  [] 96  BP: (103-152)/(57-87) 139/70  Resp:  [14-25] 18  SpO2:  [89 %-99 %] 97 %  O2 Device: None (Room air)  Nasal Cannula O2 Flow Rate (L/min):  [2 L/min-3 L/min] 2 L/min    Physical and neurologic exam performed by attending neurologist:  Physical Exam  Vitals and nursing note reviewed.   Constitutional:       General: He is not in acute distress.     Appearance: Normal appearance. He is not ill-appearing.   HENT:      Head: Normocephalic.      Mouth/Throat:      Mouth: Mucous membranes are moist.      Pharynx: Oropharynx is clear.   Eyes:      General: No scleral icterus.        Right eye: No discharge.         Left eye: No discharge.      Extraocular Movements: Nystagmus present.      Conjunctiva/sclera: Conjunctivae normal.   Cardiovascular:      Rate and Rhythm: Normal rate.   Pulmonary:      Effort: Pulmonary effort is normal. No respiratory distress.   Musculoskeletal:         General: Normal range of motion.   Skin:     General: Skin is warm and dry.      Coloration: Skin is not jaundiced.   Neurological:      Mental Status: He is oriented to person, place, and time.      Motor: Motor strength is normal.  Psychiatric:         Mood and Affect: Mood normal.         Speech: Speech normal.       Neurological Exam  Mental Status  Awake, alert and oriented to person, place and time. Oriented to person, place, time and situation. Oriented to person, place, and time. Speech is normal. Language is fluent with no aphasia. Attention and concentration are normal.    Cranial Nerves  CN II: Visual fields full to confrontation.  CN III, IV, VI: Nystagmus present: Horizontal nystagmus with fast phase to L. L eye abduction palsy. Diplopia present with L gaze..  CN V:  Right: Facial sensation is normal.  Left: Facial sensation is normal on the left.  CN VII: Full and symmetric facial movement.  CN IX, X: Palate elevates symmetrically  CN XII: Tongue midline  "without atrophy or fasciculations.    Motor  Normal muscle bulk throughout. LUE intention tremor noted.   Strength is 5/5 throughout all four extremities.    Sensory  Light touch is normal in upper and lower extremities.     Coordination  Right: Finger-to-nose normal. Heel-to-shin normal.Left: L dysmetria. Heel-to-shin normal.        Lab Results: I have reviewed the following results:I have personally reviewed pertinent reports.  , CBC:   Results from last 7 days   Lab Units 04/11/25 0622 04/11/25 0331 04/09/25  1331   WBC Thousand/uL 11.93* 10.85* 7.74   RBC Million/uL 4.34 4.72 4.77   HEMOGLOBIN g/dL 12.8 13.6 13.8   HEMATOCRIT % 39.1 41.5 42.7   MCV fL 90 88 90   PLATELETS Thousands/uL 178 199 208   , BMP/CMP:   Results from last 7 days   Lab Units 04/11/25 0331 04/09/25  1331   SODIUM mmol/L 139 138   POTASSIUM mmol/L 3.6 4.5   CHLORIDE mmol/L 108 105   CO2 mmol/L 19* 24   BUN mg/dL 31* 26*   CREATININE mg/dL 1.42* 1.44*   CALCIUM mg/dL 8.9 8.9   AST U/L  --  14   ALT U/L  --  17   ALK PHOS U/L  --  69   EGFR ml/min/1.73sq m 43 43   , Vitamin B12:   , HgBA1C:   Results from last 7 days   Lab Units 04/11/25 0622   HEMOGLOBIN A1C % 6.0*   , TSH:   , Coagulation:   Results from last 7 days   Lab Units 04/11/25 0331   INR  1.06   , Lipid Profile:   Results from last 7 days   Lab Units 04/11/25 0622   HDL mg/dL 45   LDL CALC mg/dL 104*   TRIGLYCERIDES mg/dL 100   , Ammonia:   , Urinalysis:       Invalid input(s): \"URIBILINOGEN\", Drug Screen:   , Medication Drug Levels:       Invalid input(s): \"CARBAMAZEPINE\", \"OXCARBAZEPINE\"  Recent Labs     04/11/25 0331 04/11/25 0622   WBC 10.85* 11.93*   HGB 13.6 12.8   HCT 41.5 39.1    178   SODIUM 139  --    K 3.6  --      --    CO2 19*  --    BUN 31*  --    CREATININE 1.42*  --    GLUC 184*  --      Imaging Results Review: I reviewed radiology reports from this admission including: CTA head and neck and CT head.  Other Study Results Review: No additional " pertinent studies reviewed.    VTE Prophylaxis: VTE covered by:  heparin (porcine), Subcutaneous, 5,000 Units at 04/11/25 0640     and Sequential compression device (Venodyne)

## 2025-04-11 NOTE — ASSESSMENT & PLAN NOTE
Lab Results   Component Value Date    EGFR 43 04/11/2025    EGFR 43 04/09/2025    EGFR 52 02/24/2025    CREATININE 1.42 (H) 04/11/2025    CREATININE 1.44 (H) 04/09/2025    CREATININE 1.22 02/24/2025   Baseline creatinine 1.20-1.27  In review of CTA with contrast, will begin normal saline at 75 cc an hour for 12 hrs to reduce the risk of contrast-induced nephropathy

## 2025-04-11 NOTE — ASSESSMENT & PLAN NOTE
Followed by outpatient cardiovascular surgery and is on a path for TAVR.  Patient to have a cardiac catheterization on 4/15 which probably should be delayed particularly since patient received IV contrast for CTA of the head and neck.  Will ask neurology for their opinion regarding cardiac catheterization and follow his renal function up to discharge.

## 2025-04-11 NOTE — ASSESSMENT & PLAN NOTE
88 y/o male with HTN, HLD, arthritis, depression, GERD, who presented to the ED on 4/11 with lightheadedness. In ED, he was noted to have LLE weakness. BP on presentation 128/70. Stroke alert was initiated in ED. NIHSS 2 per ED- LLE weakness and finger to nose ataxia. Patient was deemed not a TNK candidate.    CT head: No acute vascular territorial infarct, hemorrhage, or focal mass effect/midline shift. Chronic microangiopathic changes.   CTA head and neck:  No evidence for acute large vessel occlusive disease.  Short segment high-grade stenosis at the proximal mid intradural right vertebral artery secondary to calcified and noncalcified atheromatous plaque.  Focal moderate stenosis at the right ICA distal supraclinoid segment.  Moderate stenosis at the bilateral ICA origins secondary to calcified and noncalcified atheromatous plaquing, left worse than right (approximately 50 to 60% on the right and 60 to 70% on the left based on NASCET criteria).  Labs: , A1C 6.0    On neurology evaluation today, patient noted to have LUE dysmetria, horizontal nystagmus, difficulty with L eye abduction. Concern for acute infarct; pathway pending for further evaluation.    Plan:  - Stroke pathway  MRI brain  Echo  S/p aspirin 324 mg x 1. Currently on aspirin 81 mg.  Atorvastatin 40 mg  Permissive HTN, labetalol if SBP >200 x 24 hours from symptom onset; then goal normotension  Continue telemetry  PT/OT/ST  Frequent neuro checks. Continue to monitor and notify neurology with any changes.   -S/p Meclizine 50 mg x 1. Currently on Meclizine 25 mg Q8H PRN.   -Medical management and supportive care per primary team. Correction of any metabolic or infectious disturbances.   -Case and treatment plan reviewed with attending neurologist, Dr. Bustillos. Please see attending attestation for any further recommendations.

## 2025-04-11 NOTE — ASSESSMENT & PLAN NOTE
Noted on previous Holter monitor and noted on present telemetry  Initiated on metoprolol succinate 25 mg daily on 2/23/2025 and will continue

## 2025-04-11 NOTE — CASE MANAGEMENT
Case Management Assessment & Discharge Planning Note    Patient name Christian Jones  Location [facility] /E4 -* MRN [id number]  : 1935 Date 2025       Current Admission Date: 2025  Current Admission Diagnosis:Stroke-like symptoms   Patient Active Problem List    Diagnosis Date Noted Date Diagnosed    Essential hypertension 2025     Stroke-like symptoms 2025     Abnormal EKG 2025     NSVT (nonsustained ventricular tachycardia) ([facility]) 2025     Pulmonary edema 2025     Aortic stenosis 2025     Dyspnea on exertion 2025     Peripheral vascular disease ([facility]) 2024     Depression, recurrent ([facility]) 2023     CKD (chronic kidney disease) stage 3, GFR 30-59 ml/min ([facility]) 2022     Rectal bleeding 2019       LOS (days): 0  Geometric Mean LOS (GMLOS) (days):   Days to GMLOS:     OBJECTIVE:              Current admission status: Observation  Referral Reason: Stroke    Preferred Pharmacy:   [id number] [address] [address]  Phone: [phone] Fax: [phone]    Primary Care Provider: Marvin Meadows MD    Primary Insurance: [employer] [facility]  Secondary Insurance:     ASSESSMENT:  Active Health Care Proxies       Selin Jones Health Care Representative - Spouse   Primary Phone: [phone] (Mobile)  Home Phone: [phone]                 Advance Directives  Does patient have a Health Care POA?: Yes  Does patient have Advance Directives?: Yes  Advance Directives: Power of  for health care, Living will  Primary Contact: Selin, wife [phone]         Readmission Root Cause  30 Day Readmission: No    Patient Information  Admitted from:: Home  Mental Status: Alert  During Assessment patient was accompanied by: Not accompanied during assessment  Assessment information provided by:: Patient  Primary  Caregiver: Self  Support Systems: Spouse/significant other, Daughter  County of Residence: [place]  What city do you live in?: [place]  Home entry access options. Select all that apply.: Stairs  Number of steps to enter home.: 2  Type of Current Residence: [facility]  Living Arrangements: Lives w/ Spouse/significant other  Is patient a ?: No    Activities of Daily Living Prior to Admission  Functional Status: Independent  Completes ADLs independently?: Yes  Ambulates independently?: Yes  Does patient use assisted devices?: No  Does patient currently own DME?: No  Does patient have a history of Outpatient Therapy (PT/OT)?: Yes  Does the patient have a history of Short-Term Rehab?: No  Does patient have a history of HHC?: No  Does patient currently have HHC?: No         Patient Information Continued  Income Source: Pension/detention  Does patient have prescription coverage?: Yes  Can the patient afford their medications and any related supplies (such as glucometers or test strips)?: Yes  Does patient receive dialysis treatments?: No  Does patient have a history of substance abuse?: No  Does patient have a history of Mental Health Diagnosis?: No         Means of Transportation  Means of Transport to Appts:: Drives Self          DISCHARGE DETAILS:    Discharge planning discussed with:: Patient  Freedom of Choice: Yes  Comments - Freedom of Choice: OPPT  CM contacted family/caregiver?: No- see comments (Declined)  Were Treatment Team discharge recommendations reviewed with patient/caregiver?: Yes  Did patient/caregiver verbalize understanding of patient care needs?: Yes  Were patient/caregiver advised of the risks associated with not following Treatment Team discharge recommendations?: Yes         Requested Home Health Care         Is the patient interested in HHC at discharge?: No    DME Referral Provided  Referral made for DME?: Yes  DME referral completed for the following items:: [place]  DME Supplier Name::  [employer]    Other Referral/Resources/Interventions Provided:  Interventions: DME    Would you like to participate in our [facility] service program?  : No - Declined    Treatment Team Recommendation: Home  Discharge Destination Plan:: Home              CM met with patient at bedside to introduce self and role with DC planning.  Patient resides with his wife in a ranch home.  Patient was independent PTA, he drives and does not utilize any DME.  CM reviewed therapy recommendations of home with HHC vs OPPT.  Patient plans to do OPPT, declining location list as he is aware of an OP therapy center.  Patient agreeable to RW being ordered.      CM ordered RW via [employer].  Patient has no copay.  RW delivered to patient's room, delivery slip signed and placed in bin.

## 2025-04-11 NOTE — ASSESSMENT & PLAN NOTE
Allow for permissive hypertension up to 220/120 for the first 24 hours with gradual reduction if patient stable  Amlodipine has been placed on hold

## 2025-04-11 NOTE — ASSESSMENT & PLAN NOTE
Pt noted to have mild diffuse ST depressions in leads II, III, V4-V6, new compared to EKG on 1/24/2025  Pt with dizziness and N/V noted above, but denies any chest pain, palpitations, diaphoresis, shortness of breath, or back pain  Initial trop 9, continue to trend  Monitor on telemetry  ECHO pending  Cardiology consult pending

## 2025-04-11 NOTE — ASSESSMENT & PLAN NOTE
"Patient presented to the ED after arising this morning having double vision, dizziness and lower left extremity weakness.  Patient states that the double vision and dizziness are improved but not gone.  The lower extremity weakness has resolved.  Patient deemed not a candidate for TNK  Was loaded with aspirin 325 mg with aspirin 81 mg daily to follow  Was begun on atorvastatin 40 mg daily  CT of the head demonstrating \"No acute vascular territorial infarct, hemorrhage, or focal mass effect/midline shift. Chronic microangiopathic changes.\"   CTA of the head and neck demonstrating  No evidence of acute large vessel occlusive disease  Short segment high-grade stenosis of the proximal mid intradural right vertebral artery secondary to calcified and noncalcified atheromatous plaque  Focal moderate stenosis at the right internal carotid artery distal supraclinoid segment  Moderate stenosis at the bilateral internal carotid artery origins secondary to calcified and noncalcified atheromatous plaque left worse than right (right approximately 50 to 60%, left approximately 60 to 70%.  MRI pending  Telemetry monitor: Sinus tachycardia at a rate of 104 bpm with occasional PVCs with short runs of nonsustained VT.  No atrial fibrillation.  Echo pending  Neurology consult pending  "

## 2025-04-11 NOTE — ASSESSMENT & PLAN NOTE
Pt noted to have coarse rales with diffuse expiratory wheezing on exam  CXR demonstrating pulm edema, official read pending     Last ECHO 2/2025 EF 55% grade I diastolic dysfunction  Pt does not otherwise appear volume overloaded on exam  Not maintained on diuretics outpatient   Will trial dose of IV 40mg lasix now   Cardiology consult pending

## 2025-04-11 NOTE — H&P
"H&P - Hospitalist   Name: Christian Jones 89 y.o. male I MRN: [id number]  Unit/Bed#: ED-14 I Date of Admission: 4/11/2025   Date of Service: 4/11/2025 I Hospital Day: 0     Assessment & Plan  Stroke-like symptoms  Pt presented to the ED secondary to new onset dizziness, N/V, and left lower extremity weakness. Last known normal approx 1am.  Stroke alert called. NIHSS 2. Secondary to improving symptoms while in the ED, pt deemed not to be TNK candidate. Pt loaded with aspirin in the ED.  CT head demonstrating \"No acute vascular territorial infarct, hemorrhage, or focal mass effect/midline shift. Chronic microangiopathic changes.\"  CTA head and neck demonstrating   No evidence for acute large vessel occlusive disease.  Short segment high-grade stenosis at the proximal mid intradural right vertebral artery secondary to calcified and noncalcified atheromatous plaque.  Focal moderate stenosis at the right ICA distal supraclinoid segment.  Moderate stenosis at the bilateral ICA origins secondary to calcified and noncalcified atheromatous plaquing, left worse than right (approximately 50 to 60% on the right and 60 to 70% on the left based on NASCET criteria).  Monitor patient on the stroke pathway for now   MRI brain pending   Continue neuro checks  Monitor on telemetry  ECHO pending   Flp, hgba1c pending   Neuro consult pending  Abnormal EKG  Pt noted to have mild diffuse ST depressions in leads II, III, V4-V6, new compared to EKG on 1/24/2025  Pt with dizziness and N/V noted above, but denies any chest pain, palpitations, diaphoresis, shortness of breath, or back pain  Initial trop 9, continue to trend  Monitor on telemetry  ECHO pending  Cardiology consult pending  Pulmonary edema  Pt noted to have coarse rales with diffuse expiratory wheezing on exam  CXR demonstrating pulm edema, official read pending     Last ECHO 2/2025 EF 55% grade I diastolic dysfunction  Pt does not otherwise appear volume overloaded on " "exam  Not maintained on diuretics outpatient   Will trial dose of IV 40mg lasix now   Cardiology consult pending  CKD (chronic kidney disease) stage 3, GFR 30-59 ml/min ([facility])  Lab Results   Component Value Date    EGFR 43 04/11/2025    EGFR 43 04/09/2025    EGFR 52 02/24/2025    CREATININE 1.42 (H) 04/11/2025    CREATININE 1.44 (H) 04/09/2025    CREATININE 1.22 02/24/2025   Approximately at baseline  Baseline appears to be 1.2-1.4  Continue to monitor BMP  Aortic stenosis  Followed outpatient by cardiothoracic surgery, last seen 3/2025, planning for TAVR  Essential hypertension  Hold amlodipine for now to allow permissive HTN  NSVT (nonsustained ventricular tachycardia) ([facility])  Noted on Holter monitoring by cardiology  Initiated on metoprolol 2/23/2025, continue      VTE Pharmacologic Prophylaxis: VTE Score: 4 Moderate Risk (Score 3-4) - Pharmacological DVT Prophylaxis Ordered: heparin.  Code Status: Level 1 - Full Code   Discussion with family: Patient declined call to .     Anticipated Length of Stay: Patient will be admitted on an observation basis with an anticipated length of stay of less than 2 midnights secondary to stroke-like symptoms, abnormal EKG, pulm edema on CXR.    History of Present Illness   Chief Complaint: \"I'm dizzy\"    Christian Jones is a 89 y.o. male who presents with stroke-like symptoms, abnormal EKG, pulm edema on CXR. Pt reports that at approx 1am, he began with intense dizziness with N/V. Pt also states he noted he left lower extremity to be weak, however this is now resolved. He reports that besides this, he had no other neuro symptoms such as numbness, facial droop, or speech difficulty. Stroke alert was called in the ED, NIHSS 2. Pt also noted to have new mild ST depressions on his EKG compared to last EKG in January. He denies any chest pain, back pain, shortness of breath, diaphoresis, or pedal edema. Despite denying shortness of breath, pt was noted to have diffuse " coarse rales and expiratory wheezing on exam. Currently maintained oxygen saturations on RA. He denies any fever, chills, cough, or congestion.     Review of Systems   Constitutional:  Positive for fatigue. Negative for appetite change, chills, diaphoresis and fever.   HENT:  Negative for congestion, rhinorrhea and sore throat.    Eyes:  Negative for photophobia and visual disturbance.   Respiratory:  Negative for cough, shortness of breath and wheezing.    Cardiovascular:  Negative for chest pain, palpitations and leg swelling.   Gastrointestinal:  Positive for nausea and vomiting. Negative for abdominal distention, abdominal pain, blood in stool, constipation and diarrhea.   Genitourinary:  Negative for dysuria and hematuria.   Musculoskeletal:  Negative for arthralgias, back pain, myalgias and neck stiffness.   Skin:  Negative for color change and rash.   Neurological:  Positive for dizziness and weakness (left lower extremity, resolved). Negative for tremors, seizures, syncope, facial asymmetry, speech difficulty, light-headedness, numbness and headaches.   Psychiatric/Behavioral:  Negative for agitation, behavioral problems and confusion. The patient is not nervous/anxious.    All other systems reviewed and are negative.      Historical Information   Past Medical History:   Diagnosis Date    Arthritis 1/2000    Basal cell carcinoma     Head    Depression 1/1990    GERD (gastroesophageal reflux disease) 1/1990    Hyperlipemia     Hypertension     Positive fecal occult blood test     Psoriasis     Rectal bleeding     Visual impairment 1/1950    corrected     Past Surgical History:   Procedure Laterality Date    BASAL CELL CARCINOMA EXCISION      COLONOSCOPY      COLONOSCOPY  03/28/2019    HERNIA REPAIR  2012    Inguinal    LIPOMA RESECTION      Scalp    SIGMOIDOSCOPY       Social History     Tobacco Use    Smoking status: Former     Current packs/day: 0.00     Average packs/day: 0.3 packs/day for 15.0 years (3.8  ttl pk-yrs)     Types: Pipe, Cigarettes     Start date:      Quit date: 1985     Years since quittin.8    Smokeless tobacco: Never   Vaping Use    Vaping status: Never Used   Substance and Sexual Activity    Alcohol use: Yes     Alcohol/week: 2.0 standard drinks of alcohol     Types: 2 Glasses of wine per week     Comment: per week    Drug use: Never    Sexual activity: Not Currently     Partners: Female     Birth control/protection: Condom Male     E-Cigarette/Vaping    E-Cigarette Use Never User      E-Cigarette/Vaping Substances     Family History   Problem Relation Age of Onset    Hypertension Father     Kidney disease Father     Thrombosis Mother         cause of death     Social History:  Marital Status: /Civil Union   Patient Pre-hospital Living Situation: Home  Patient Pre-hospital Level of Mobility: walks  Patient Pre-hospital Diet Restrictions: none    Meds/Allergies   I have reviewed home medications using recent Epic encounter.  Prior to Admission medications    Medication Sig Start Date End Date Taking? Authorizing Provider   acetaminophen (TYLENOL) 650 mg CR tablet Take 650 mg by mouth every 8 (eight) hours as needed for mild pain For arthritis pain    Historical Provider, MD   alfuzosin (UROXATRAL) 10 mg 24 hr tablet TAKE 1 TABLET BY MOUTH DAILY 3/17/25   KAI Vegas   amLODIPine (NORVASC) 10 mg tablet TAKE 1 TABLET BY MOUTH EVERY DAY 24   Marvin Meadows MD   metoprolol succinate (TOPROL-XL) 25 mg 24 hr tablet Take 1 tablet (25 mg total) by mouth daily In AM 25   Michael Maier MD   Multiple Vitamin (multivitamin) tablet Take 1 tablet by mouth daily    Historical Provider, MD     No Known Allergies    Objective :  HR:  [] 92  BP: (128-152)/(61-84) 134/65  Resp:  [14-25] 19  SpO2:  [93 %-99 %] 93 %  O2 Device: None (Room air)    Physical Exam  Vitals and nursing note reviewed.   Constitutional:       General: He is not in acute distress.      Appearance: He is well-developed. He is ill-appearing.   HENT:      Head: Normocephalic and atraumatic.      Nose: Nose normal. No congestion.      Mouth/Throat:      Mouth: Mucous membranes are dry.      Pharynx: Oropharynx is clear.   Eyes:      General: No visual field deficit.     Conjunctiva/sclera: Conjunctivae normal.   Cardiovascular:      Rate and Rhythm: Regular rhythm. Tachycardia present.      Heart sounds: Normal heart sounds. No murmur heard.     No friction rub. No gallop.   Pulmonary:      Effort: Pulmonary effort is normal. No respiratory distress.      Breath sounds: Wheezing (diffuse expiratory wheezing) and rales (coarse diffuse rales) present. No rhonchi.      Comments: On RA  Abdominal:      General: Bowel sounds are normal. There is no distension.      Palpations: Abdomen is soft.      Tenderness: There is no abdominal tenderness.   Musculoskeletal:      Cervical back: Neck supple.      Right lower leg: No edema.      Left lower leg: No edema.   Skin:     General: Skin is warm and dry.      Capillary Refill: Capillary refill takes less than 2 seconds.   Neurological:      General: No focal deficit present.      Mental Status: He is alert and oriented to person, place, and time. Mental status is at baseline.      Cranial Nerves: No cranial nerve deficit, dysarthria or facial asymmetry.      Sensory: Sensation is intact.      Motor: No weakness.      Coordination: Finger-Nose-Finger Test abnormal (mild bilateral dysmetria).      Comments: Pronounced horizontal nystagmus with leftward gaze   Psychiatric:         Mood and Affect: Mood normal.         Behavior: Behavior normal.          Lines/Drains:            Lab Results: I have reviewed the following results:  Results from last 7 days   Lab Units 04/11/25  0331 04/09/25  1331   WBC Thousand/uL 10.85* 7.74   HEMOGLOBIN g/dL 13.6 13.8   HEMATOCRIT % 41.5 42.7   PLATELETS Thousands/uL 199 208   SEGS PCT %  --  55   LYMPHO PCT %  --  29   MONO PCT %  " --  10   EOS PCT %  --  5     Results from last 7 days   Lab Units 04/11/25  0331 04/09/25  1331   SODIUM mmol/L 139 138   POTASSIUM mmol/L 3.6 4.5   CHLORIDE mmol/L 108 105   CO2 mmol/L 19* 24   BUN mg/dL 31* 26*   CREATININE mg/dL 1.42* 1.44*   ANION GAP mmol/L 12 9   CALCIUM mg/dL 8.9 8.9   ALBUMIN g/dL  --  4.2   TOTAL BILIRUBIN mg/dL  --  0.53   ALK PHOS U/L  --  69   ALT U/L  --  17   AST U/L  --  14   GLUCOSE RANDOM mg/dL 184*  --      Results from last 7 days   Lab Units 04/11/25  0331   INR  1.06     Results from last 7 days   Lab Units 04/11/25  0330   POC GLUCOSE mg/dl 163*     No results found for: \"HGBA1C\"        Imaging Results Review: I personally reviewed the following image studies in PACS and associated radiology reports: chest xray. My interpretation of the radiology images/reports is: pulmonary edema.  Other Study Results Review: EKG was reviewed.     Administrative Statements   I have spent a total time of 75 minutes in caring for this patient on the day of the visit/encounter including Diagnostic results, Impressions, Counseling / Coordination of care, Documenting in the medical record, Reviewing/placing orders in the medical record (including tests, medications, and/or procedures), Obtaining or reviewing history  , and Communicating with other healthcare professionals .    ** Please Note: This note has been constructed using a voice recognition system. **    "

## 2025-04-11 NOTE — Clinical Note
Case was discussed with LARA and the patient's admission status was agreed to be Admission Status: observation status to the service of Dr. Acosta

## 2025-04-11 NOTE — CONSULTS
"Consultation - Cardiology   Name: Christian Jones 89 y.o. male I MRN: [id number]  Unit/Bed#: E4 -01 I Date of Admission: 4/11/2025   Date of Service: 4/11/2025 I Hospital Day: 0   Inpatient consult to Cardiology  Consult performed by: Michael Maier MD  Consult ordered by: Christos Hays PA-C        Physician Requesting Evaluation: Karen Orantes DO   Reason for Evaluation / Principal Problem: Strokelike symptoms inpatient on TAVR pathway    Assessment & Plan  Stroke-like symptoms  Patient presented to the ED after arising this morning having double vision, dizziness and lower left extremity weakness.  Patient states that the double vision and dizziness are improved but not gone.  The lower extremity weakness has resolved.  Patient deemed not a candidate for TNK  Was loaded with aspirin 325 mg with aspirin 81 mg daily to follow  Was begun on atorvastatin 40 mg daily  CT of the head demonstrating \"No acute vascular territorial infarct, hemorrhage, or focal mass effect/midline shift. Chronic microangiopathic changes.\"   CTA of the head and neck demonstrating  No evidence of acute large vessel occlusive disease  Short segment high-grade stenosis of the proximal mid intradural right vertebral artery secondary to calcified and noncalcified atheromatous plaque  Focal moderate stenosis at the right internal carotid artery distal supraclinoid segment  Moderate stenosis at the bilateral internal carotid artery origins secondary to calcified and noncalcified atheromatous plaque left worse than right (right approximately 50 to 60%, left approximately 60 to 70%.  MRI pending  Telemetry monitor: Sinus tachycardia at a rate of 104 bpm with occasional PVCs with short runs of nonsustained VT.  No atrial fibrillation.  Echo pending  Neurology consult pending  Abnormal EKG  Sinus tachycardia with frequent premature ventricular contractions and nonspecific ST-T wave abnormalities in leads I, 2, 3, V3 through V6 which " were worse than seen on office EKG from January 2025.  These EKG changes are nonspecific in nature and not necessarily ischemic.  Troponins x 3 normal  Patient did not have chest pain  Patient is not short of breath.  On examination there are bibasilar Rales which improved with coughing  Chest x-ray demonstrates no acute cardiopulmonary disease.  There is no evidence for pulmonary edema.  Patient is euvolemic on examination  Would not give patient any further diuretics in view of known low-flow severe aortic stenosis.  Patient may actually be mildly dehydrated in view of present sinus tachycardia in the setting of low-flow severe aortic stenosis  CKD (chronic kidney disease) stage 3, GFR 30-59 ml/min ([facility])  Lab Results   Component Value Date    EGFR 43 04/11/2025    EGFR 43 04/09/2025    EGFR 52 02/24/2025    CREATININE 1.42 (H) 04/11/2025    CREATININE 1.44 (H) 04/09/2025    CREATININE 1.22 02/24/2025   Baseline creatinine 1.20-1.27  In review of CTA with contrast, will begin normal saline at 75 cc an hour for 12 hrs to reduce the risk of contrast-induced nephropathy  Aortic stenosis  Followed by outpatient cardiovascular surgery and is on a path for TAVR.  Patient to have a cardiac catheterization on 4/15 which probably should be delayed particularly since patient received IV contrast for CTA of the head and neck.  Will ask neurology for their opinion regarding cardiac catheterization and follow his renal function up to discharge.  Essential hypertension  Allow for permissive hypertension up to 220/120 for the first 24 hours with gradual reduction if patient stable  Amlodipine has been placed on hold  NSVT (nonsustained ventricular tachycardia) ([facility])  Noted on previous Holter monitor and noted on present telemetry  Initiated on metoprolol succinate 25 mg daily on 2/23/2025 and will continue      History of Present Illness   Christian Jones is a 89 y.o. male who presents with patient woke up approximately 1 AM  with double vision, blurred vision, dizziness and weakness in his left lower leg.  He came to the ED for evaluation.  Symptoms have been improving.  Patient is presently on the TAVR pathway and was scheduled for cardiac catheterization on 4/15/2025.  Most likely this will need to be delayed.  Would like neurology's opinion.  Will also monitor renal function following contrast utilization for CTA of the head and neck.      PROBLEM LIST:    Patient Active Problem List    Diagnosis Date Noted    Essential hypertension 04/11/2025    Stroke-like symptoms 04/11/2025    Abnormal EKG 04/11/2025    NSVT (nonsustained ventricular tachycardia) ([facility]) 04/11/2025    Pulmonary edema 04/11/2025    Aortic stenosis 01/20/2025    Dyspnea on exertion 01/20/2025    Peripheral vascular disease ([facility]) 09/13/2024    Depression, recurrent ([facility]) 02/13/2023    CKD (chronic kidney disease) stage 3, GFR 30-59 ml/min ([facility]) 02/07/2022    Rectal bleeding 03/20/2019       PREVIOUS WEIGHTS:   Weight (last 2 days)       Date/Time Weight    04/11/25 0835 70.3 (154.98)    04/11/25 0323 70.3 (154.98)            Wt Readings from Last 2 Encounters:   04/11/25 70.3 kg (154 lb 15.7 oz)   03/21/25 71.1 kg (156 lb 12.8 oz)         Intake/Output Summary (Last 24 hours) at 4/11/2025 1155  Last data filed at 4/11/2025 0954  Gross per 24 hour   Intake --   Output 1165 ml   Net -1165 ml       Labs/Data:        Results from last 7 days   Lab Units 04/11/25  0622 04/11/25  0331 04/09/25  1331   WBC Thousand/uL 11.93* 10.85* 7.74   HEMOGLOBIN g/dL 12.8 13.6 13.8   HEMATOCRIT % 39.1 41.5 42.7   MCV fL 90 88 90   MCH pg 29.5 28.8 28.9   MCHC g/dL 32.7 32.8 32.3   PLATELETS Thousands/uL 178 199 208     Results from last 7 days   Lab Units 04/11/25  0331 04/09/25  1331   EGFR ml/min/1.73sq m 43 43   SODIUM mmol/L 139 138   POTASSIUM mmol/L 3.6 4.5   CHLORIDE mmol/L 108 105   CO2 mmol/L 19* 24   BUN mg/dL 31* 26*   CREATININE mg/dL 1.42* 1.44*     Results from last 7 days    Lab Units 04/11/25  0331 04/09/25  1331   CALCIUM mg/dL 8.9 8.9   AST U/L  --  14   ALT U/L  --  17   ALK PHOS U/L  --  69       Lab Results   Component Value Date    CHOLESTEROL 169 04/11/2025    TRIG 100 04/11/2025    HDL 45 04/11/2025    LDLCALC 104 (H) 04/11/2025    HGBA1C 6.0 (H) 04/11/2025     Results from last 7 days   Lab Units 04/11/25  0331   INR  1.06   PROTIME seconds 14.0          Current Facility-Administered Medications:     acetaminophen (TYLENOL) tablet 650 mg, 650 mg, Oral, Q6H PRN, Christos Hays PA-C    [Held by provider] amLODIPine (NORVASC) tablet 10 mg, 10 mg, Oral, Daily, Christos Hays PA-C    aspirin chewable tablet 81 mg, 81 mg, Oral, Daily, Christos Hays PA-C, 81 mg at 04/11/25 0838    atorvastatin (LIPITOR) tablet 40 mg, 40 mg, Oral, QPM, Christos Hays PA-C    heparin (porcine) subcutaneous injection 5,000 Units, 5,000 Units, Subcutaneous, Q8H ELSY, Christos Hays PA-C, 5,000 Units at 04/11/25 0640    ipratropium-albuterol (DUO-NEB) 0.5-2.5 mg/3 mL inhalation solution 3 mL, 3 mL, Nebulization, Q6H PRN, Christos Hays PA-C    meclizine (ANTIVERT) tablet 25 mg, 25 mg, Oral, Q8H PRN, Christos Hays PA-C    metoclopramide (REGLAN) injection 10 mg, 10 mg, Intravenous, Q6H PRN, Christos Hays PA-C, 10 mg at 04/11/25 0557    metoprolol succinate (TOPROL-XL) 24 hr tablet 25 mg, 25 mg, Oral, Daily, Christos Hays PA-C, 25 mg at 04/11/25 0838    ondansetron (ZOFRAN) injection 4 mg, 4 mg, Intravenous, Q6H PRN, Christos Hays PA-C, 4 mg at 04/11/25 0929    pantoprazole (PROTONIX) injection 40 mg, 40 mg, Intravenous, Q12H ELSY, Christos Hays PA-C, 40 mg at 04/11/25 0625    sodium chloride 0.9 % infusion, 75 mL/hr, Intravenous, Continuous, Michael Maier MD    tamsulosin (FLOMAX) capsule 0.4 mg, 0.4 mg, Oral, Daily With Dinner, Christos Hays PA-C  No Known Allergies    Invasive Devices       Peripheral Intravenous Line  Duration             Peripheral IV  "04/11/25 Distal;Left;Upper;Ventral (anterior) Arm <1 day    Peripheral IV 04/11/25 Distal;Right;Upper;Ventral (anterior) Arm <1 day                    Review of Systems   Constitutional: Negative.    HENT: Negative.     Eyes:  Positive for visual disturbance.        Double vision   Respiratory:  Negative for cough, chest tightness, shortness of breath and wheezing.    Cardiovascular:  Negative for chest pain, palpitations and leg swelling.   Gastrointestinal: Negative.    Endocrine: Negative.    Musculoskeletal: Negative.    Skin: Negative.    Allergic/Immunologic: Negative.    Neurological:  Positive for dizziness, weakness (Left leg) and light-headedness.   Hematological: Negative.    Psychiatric/Behavioral: Negative.         Vitals: /75   Pulse 101   Temp (!) 97 °F (36.1 °C) (Temporal)   Resp 16   Ht 5' 4\" (1.626 m)   Wt 70.3 kg (154 lb 15.7 oz)   SpO2 97%   BMI 26.60 kg/m²     Physical Exam  Vitals reviewed.   Constitutional:       General: He is not in acute distress.     Appearance: He is well-developed.   HENT:      Head: Normocephalic and atraumatic.   Neck:      Thyroid: No thyromegaly.      Vascular: No carotid bruit or JVD.      Trachea: No tracheal deviation.   Cardiovascular:      Rate and Rhythm: Normal rate and regular rhythm.      Pulses: Normal pulses.      Heart sounds: Murmur heard.      Crescendo decrescendo systolic murmur is present with a grade of 2/6.      No friction rub. No gallop.   Pulmonary:      Effort: Pulmonary effort is normal. No respiratory distress.      Breath sounds: Examination of the right-lower field reveals rales. Examination of the left-lower field reveals rales. Rales present. No wheezing or rhonchi.   Chest:      Chest wall: No tenderness.   Abdominal:      General: Bowel sounds are normal. There is no distension.      Palpations: Abdomen is soft.      Tenderness: There is no abdominal tenderness.   Musculoskeletal:      Cervical back: Normal range of motion " "and neck supple.      Right lower leg: No edema.      Left lower leg: No edema.   Skin:     General: Skin is warm and dry.   Neurological:      General: No focal deficit present.      Mental Status: He is alert and oriented to person, place, and time.      Gait: Gait normal.   Psychiatric:         Mood and Affect: Mood normal.         Behavior: Behavior normal.         Thought Content: Thought content normal.         Judgment: Judgment normal.     Portions of the record may have been created with voice recognition software. Occasional wrong word or \"sound a like\" substitutions may have occurred due to the inherent limitations of voice recognition software. Read the chart carefully and recognize, using context, where substitutions have occurred.    ======================================================    Imaging Results Review: I personally reviewed the following image studies in PACS and associated radiology reports: chest xray. My interpretation of the radiology images/reports is: No evidence of pulmonary edema or congestive heart failure.  Other Study Results Review: EKG was reviewed.     "

## 2025-04-11 NOTE — QUICK NOTE
Neurology Quick Note:       Called by  regarding stroke alert at 3:30 am, neuro response was immediate.    Christian Jones is a 89 y.o. male presenting to the ER for lightheadedness    Last known well 1 AM.   Initial NIHSS 2 (left lower extremity weakness and body ataxia finger-to-nose). Symptoms improving and repeat NIHSS was 0    Initial BP Blood Pressure: 128/70, FSBG POC Glucose: (!) 163.     CTH & CTA were unremarkable for any acute pathology, LVO, or other IR target.     - Home Antiplatelet /Anticoagulants PTA: No antiplatelet or anticoagulants     As a result of Symptoms resolved and/or clearly non disabling patient was not determined to be a candidate for thrombolysis (TNK).    Plan:   Admit along the stroke pathway with telemetry monitoring  Frequent Neuro Checks - STAT CTH for change in exam  Strict NPO prior to dysphagia screen   BP Goals: Permissive Hypertension - SBP < 220/120 for first 24 hrs, followed by gradual reduction if neurologically stable   AP/AC: Please load with Aspirin 325 mg once and then 81 mg daily  High Intensity Statin: Start Atorvastatin 40 mg QHS  Imaging: MRI Brain w/o contrast, Transthoracic Echocardiogram   Labs: Lipid Panel, A1c  Euthermic/Euglycemic  DVT PPx: as per primary team, SCDs  PT/OT/ST/PMR evaluations when able  Stroke education and counseling  Rest of other care per primary team appreciated        Vickie Gallo MD.   Staff Neurologist,

## 2025-04-11 NOTE — PLAN OF CARE
Problem: PAIN - ADULT  Goal: Verbalizes/displays adequate comfort level or baseline comfort level  Description: Interventions:- Encourage patient to monitor pain and request assistance- Assess pain using appropriate pain scale- Administer analgesics based on type and severity of pain and evaluate response- Implement non-pharmacological measures as appropriate and evaluate response- Consider cultural and social influences on pain and pain management- Notify physician/advanced practitioner if interventions unsuccessful or patient reports new pain  Outcome: Progressing     Problem: INFECTION - ADULT  Goal: Absence or prevention of progression during hospitalization  Description: INTERVENTIONS:- Assess and monitor for signs and symptoms of infection- Monitor lab/diagnostic results- Monitor all insertion sites, i.e. indwelling lines, tubes, and drains- Monitor endotracheal if appropriate and nasal secretions for changes in amount and color- [place] appropriate cooling/warming therapies per order- Administer medications as ordered- Instruct and encourage patient and family to use good hand hygiene technique- Identify and instruct in appropriate isolation precautions for identified infection/condition  Outcome: Progressing  Goal: Absence of fever/infection during neutropenic period  Description: INTERVENTIONS:- Monitor WBC  Outcome: Progressing     Problem: SAFETY ADULT  Goal: Patient will remain free of falls  Description: INTERVENTIONS:- Educate patient/family on patient safety including physical limitations- Instruct patient to call for assistance with activity - Consult OT/PT to assist with strengthening/mobility - Keep Call bell within reach- Keep bed low and locked with side rails adjusted as appropriate- Keep care items and personal belongings within reach- Initiate and maintain comfort rounds- Make Fall Risk Sign visible to staff- Offer Toileting every  Hours, in advance of need- Initiate/Maintain alarm- Obtain  necessary fall risk management equipment: - Apply yellow socks and bracelet for high fall risk patients- Consider moving patient to room near nurses station  Outcome: Progressing  Goal: Maintain or return to baseline ADL function  Description: INTERVENTIONS:-  Assess patient's ability to carry out ADLs; assess patient's baseline for ADL function and identify physical deficits which impact ability to perform ADLs (bathing, care of mouth/teeth, toileting, grooming, dressing, etc.)- Assess/evaluate cause of self-care deficits - Assess range of motion- Assess patient's mobility; develop plan if impaired- Assess patient's need for assistive devices and provide as appropriate- Encourage maximum independence but intervene and supervise when necessary- Involve family in performance of ADLs- Assess for home care needs following discharge - Consider OT consult to assist with ADL evaluation and planning for discharge- Provide patient education as appropriate  Outcome: Progressing  Goal: Maintains/Returns to pre admission functional level  Description: INTERVENTIONS:- Perform AM-PAC 6 Click Basic Mobility/ Daily Activity assessment daily.- Set and communicate daily mobility goal to care team and patient/family/caregiver. - Collaborate with rehabilitation services on mobility goals if consulted- Perform Range of Motion  times a day.- Reposition patient every  hours.- Dangle patient  times a day- Stand patient  times a day- Ambulate patient  times a day- Out of bed to chair  times a day - Out of bed for meals  times a day- Out of bed for toileting- Record patient progress and toleration of activity level   Outcome: Progressing     Problem: DISCHARGE PLANNING  Goal: Discharge to home or other facility with appropriate resources  Description: INTERVENTIONS:- Identify barriers to discharge w/patient and caregiver- Arrange for needed discharge resources and transportation as appropriate- Identify discharge learning needs (meds, wound  care, etc.)- Arrange for interpretive services to assist at discharge as needed- Refer to Case Management Department for coordinating discharge planning if the patient needs post-hospital services based on physician/advanced practitioner order or complex needs related to functional status, cognitive ability, or social support system  Outcome: Progressing     Problem: Knowledge Deficit  Goal: Patient/family/caregiver demonstrates understanding of disease process, treatment plan, medications, and discharge instructions  Description: Complete learning assessment and assess knowledge base.Interventions:- Provide teaching at level of understanding- Provide teaching via preferred learning methods  Outcome: Progressing

## 2025-04-11 NOTE — PLAN OF CARE
Problem: OCCUPATIONAL THERAPY ADULT  Goal: Performs self-care activities at highest level of function for planned discharge setting.  See evaluation for individualized goals.  Description: Treatment Interventions: ADL retraining, Functional transfer training, Endurance training, Patient/family training, Equipment evaluation/education, Compensatory technique education, Continued evaluation, Energy conservation, Activityengagement          See flowsheet documentation for full assessment, interventions and recommendations.   Note: Limitation: Decreased ADL status, Decreased endurance, Decreased self-care trans, Decreased high-level ADLs (decreased balance, decreased coordination, decreased functional reach)  Prognosis: Fair  Assessment: Patient is a 89 y.o. year old male seen for OT eval s/p admit to [facility] on 4/11/2025 with Dizziness, Pulmonary edema, Abnormal EKG, Stroke-like symptoms. OT consulted to assess ADLs/IADLs/functional mobility and assist w/ D/C planning.  Pt's CLOF as follows: eating/grooming: supervision , UB ADLs: supervision , LB ADLs: supervision , toileting: supervision , bed mobility: supervision , functional transfers: supervision , functional mobility: supervision , sitting/standing tolerance: 5 min/ 2 min. Pt would benefit from continued skilled OT while in acute setting to address deficits as defined above and to maximize (I) w/ ADLs/functional mobility. Occupational performance areas to address include: grooming, bathing/shower, toilet hygiene, dressing, medication management, socialization, health maintenance, functional mobility, community mobility, clothing management, cleaning, meal prep, and social participation. Patient demonstrates the following deficits impacting occupational performance: weakness, decreased balance, decreased activity tolerance, limited functional reach, impulsivity, dizziness, nausea, increased body habitus , visual deficits, and decreased cardiovascular endurance.  These impairments, as well at pt’s CHAR home environment, limited home support, difficulty performing ADLs, difficulty performing IADLs, difficulty performing transfers/mobility, fall risk , functional decline , new use of AD for functional transfers/mobility, advanced age, and inability to perform caregiver duties , limit pt’s ability to safely engage in all baseline areas of occupation. Based on the aforementioned evaluation, functional performance deficits, and assessments, pt has been identified as a high complexity evaluation. At this time, recommendation for pt to receive post-acute rehabilitation services at a Level III (minimum resource intensity) due to above deficits and CLOF. OT will continue to follow pt 2-3x/wk to address the goals listed below to  w/in 10-14 days.     Rehab Resource Intensity Level, OT: III (Minimum Resource Intensity)

## 2025-04-11 NOTE — ASSESSMENT & PLAN NOTE
Lab Results   Component Value Date    EGFR 43 04/11/2025    EGFR 43 04/09/2025    EGFR 52 02/24/2025    CREATININE 1.42 (H) 04/11/2025    CREATININE 1.44 (H) 04/09/2025    CREATININE 1.22 02/24/2025     -Medical management per primary team

## 2025-04-11 NOTE — PLAN OF CARE
Problem: PHYSICAL THERAPY ADULT  Goal: Performs mobility at highest level of function for planned discharge setting.  See evaluation for individualized goals.  Description: Treatment/Interventions: Functional transfer training, LE strengthening/ROM, Elevations, Therapeutic exercise, Patient/family training, Equipment eval/education, Bed mobility, Gait training, Compensatory technique education, Continued evaluation, Spoke to nursing, OT          See flowsheet documentation for full assessment, interventions and recommendations.  Note: Prognosis: Good  Problem List: Impaired balance, Decreased mobility  Assessment: Christian Jones is a 89 y.o. male admitted to [facility] on 4/11/2025 for Stroke-like symptoms. PT was consulted and pt was seen on 4/11/2025 for mobility assessment and d/c planning. Pt presents w low fall risk, telemetry. At baseline is indep without an AD. Pt is currently functioning at a S for bed mobility, transfers and ambulation. Pt demonstrated +nystagmus w associated dizziness. He was unable to take steps without external support and ultimately require bl support of RW (compared to [facility]). Educated pt on visual fixation while walking. With the walker he was able to walk limited household distances. Defer further mobility dt pt not feeling well. Given MRI brain pending and pt w recent active nausea/vomiting, defer formal vestibular testing (ie queta hallpike) at this time. Pending results of MRI and ongoing sx may benefit from vestibular testing in either IP or OP setting. Will maintain on caseload for further vestibular intervention as apporpriate, to progress mobility as tolerated and provide compensatory techniques prn.  Barriers to Discharge: None     Rehab Resource Intensity Level, PT: III (Minimum Resource Intensity) (OP PT- vestibular)    See flowsheet documentation for full assessment.

## 2025-04-11 NOTE — RESTORATIVE TECHNICIAN NOTE
Restorative Technician Note      Patient Name: Christian Jones     Restorative Tech Visit Date: 04/11/25  Note Type: Mobility  Patient Position Upon Consult: Supine  Activity Performed: Ambulated  Assistive Device: Roller walker  Patient Position at End of Consult: All needs within reach; Supine; Bed/Chair alarm activated            ns

## 2025-04-11 NOTE — ASSESSMENT & PLAN NOTE
Sinus tachycardia with frequent premature ventricular contractions and nonspecific ST-T wave abnormalities in leads I, 2, 3, V3 through V6 which were worse than seen on office EKG from January 2025.  These EKG changes are nonspecific in nature and not necessarily ischemic.  Troponins x 3 normal  Patient did not have chest pain  Patient is not short of breath.  On examination there are bibasilar Rales which improved with coughing  Chest x-ray demonstrates no acute cardiopulmonary disease.  There is no evidence for pulmonary edema.  Patient is euvolemic on examination  Would not give patient any further diuretics in view of known low-flow severe aortic stenosis.  Patient may actually be mildly dehydrated in view of present sinus tachycardia in the setting of low-flow severe aortic stenosis

## 2025-04-11 NOTE — ASSESSMENT & PLAN NOTE
"Pt presented to the ED secondary to new onset dizziness, N/V, and left lower extremity weakness. Last known normal approx 1am.  Stroke alert called. NIHSS 2. Secondary to improving symptoms while in the ED, pt deemed not to be TNK candidate. Pt loaded with aspirin in the ED.  CT head demonstrating \"No acute vascular territorial infarct, hemorrhage, or focal mass effect/midline shift. Chronic microangiopathic changes.\"  CTA head and neck demonstrating   No evidence for acute large vessel occlusive disease.  Short segment high-grade stenosis at the proximal mid intradural right vertebral artery secondary to calcified and noncalcified atheromatous plaque.  Focal moderate stenosis at the right ICA distal supraclinoid segment.  Moderate stenosis at the bilateral ICA origins secondary to calcified and noncalcified atheromatous plaquing, left worse than right (approximately 50 to 60% on the right and 60 to 70% on the left based on NASCET criteria).  Monitor patient on the stroke pathway for now   MRI brain pending   Continue neuro checks  Monitor on telemetry  ECHO pending   Flp, hgba1c pending   Neuro consult pending  "

## 2025-04-11 NOTE — ASSESSMENT & PLAN NOTE
Lab Results   Component Value Date    EGFR 43 04/11/2025    EGFR 43 04/09/2025    EGFR 52 02/24/2025    CREATININE 1.42 (H) 04/11/2025    CREATININE 1.44 (H) 04/09/2025    CREATININE 1.22 02/24/2025   Approximately at baseline  Baseline appears to be 1.2-1.4  Continue to monitor BMP

## 2025-04-11 NOTE — PHYSICAL THERAPY NOTE
PHYSICAL THERAPY EVALUATION          Patient Name: Christian Jones  Today's Date: 4/11/2025 04/11/25 1012   PT Last Visit   PT Visit Date 04/11/25   Note Type   Note type Evaluation   Pain Assessment   Pain Assessment Tool 0-10   Pain Score No Pain   Restrictions/Precautions   Other Precautions Chair Alarm;Bed Alarm;Telemetry;Fall Risk   Home Living   Type of Home House   Home Layout Able to live on main level with bedroom/bathroom;Stairs to enter with rails   Bathroom Shower/Tub Tub/shower unit   Bathroom Toilet Raised   Bathroom Equipment Grab bars in shower;Built-in shower seat   Home Equipment Cane  (spouse owns RW, Rollator)   Additional Comments 2 CHAR. first fl living but does have basement level   Prior Function   Level of [place] Independent with ADLs;Independent with functional mobility;Independent with IADLS   Lives With Spouse   IADLs Independent with driving;Independent with meal prep;Independent with medication management   Vocational Retired   Comments pt reports being indep at baseline. his spouse has a handicap, uses a Rollator and he helps her physically/with toileting.   General   Additional Pertinent History pt admitted 4/11/25 for stroke like sx. oob to chair orders. describes sx as feeling dizzy, specifically off balance. this is unchanged w positional changes. endorses some nausea. PMHx significant for PVD, depression   Cognition   Overall Cognitive Status WFL   Arousal/Participation Cooperative   Orientation Level Oriented X4   Memory Within functional limits   Following Commands Follows all commands and directions without difficulty   Comments flat affect   RLE Assessment   RLE Assessment WFL   LLE Assessment   LLE Assessment WFL   Vestibular   Gaze Holding Nystagmus (+) nystagmus room light   Vestibular Comments L beating when looking: horizontally to the L, upwards diagonally in both L/R directions, and downwards diagonally to the L   Coordination   Sensation WFL   Bed  Mobility   Supine to Sit 5  Supervision   Additional items HOB elevated   Sit to Supine 5  Supervision   Additional items HOB elevated   Transfers   Sit to Stand 5  Supervision   Stand to Sit 5  Supervision   Additional items Impulsive   Ambulation/Elevation   Gait pattern Excessively slow;Forward Flexion   Gait Assistance 5  Supervision   Additional items Assist x 1   Assistive Device Rolling walker   Distance 20'   Balance   Static Standing Fair   Dynamic Standing Fair -   Ambulatory Fair -   Endurance Deficit   Endurance Deficit No  (vitals pre amb 143/76, 104, 95% on RA)   Activity Tolerance   Activity Tolerance Treatment limited secondary to medical complications (Comment)  (dizzy sx)   Medical Staff Made Aware OT   Nurse Made Aware yes   Assessment   Prognosis Good   Problem List Impaired balance;Decreased mobility   Assessment Christian Jones is a 89 y.o. male admitted to [facility] on 4/11/2025 for Stroke-like symptoms. PT was consulted and pt was seen on 4/11/2025 for mobility assessment and d/c planning. Pt presents w low fall risk, telemetry. At baseline is indep without an AD. Pt is currently functioning at a S for bed mobility, transfers and ambulation. Pt demonstrated +nystagmus w associated dizziness. He was unable to take steps without external support and ultimately require bl support of RW (compared to SPC). Educated pt on visual fixation while walking. With the walker he was able to walk limited household distances. Defer further mobility dt pt not feeling well. Given MRI brain pending and pt w recent active nausea/vomiting, defer formal vestibular testing (ie queta hallpike) at this time. Pending results of MRI and ongoing sx may benefit from vestibular testing in either IP or OP setting. Will maintain on caseload for further vestibular intervention as apporpriate, to progress mobility as tolerated and provide compensatory techniques prn.   Barriers to Discharge None   Goals   Patient Goals feel better    STG Expiration Date 04/21/25   Short Term Goal #1 1) Pt will perform bed mobility indep demonstrating appropriate technique 100% of the time in order to improve function. 2) Pt will perform all transfers mod I demonstrating safe technique 100% of the time in order to improve ability to negotiate safely in home environment. 3) Amb with least restrictive AD > 100'x1 with mod I in order to demonstrate ability to negotiate in home environment. 4) Improve overall strength and balance 1/2 grade in order to optimize ability to perform functional tasks and reduce fall risk. 5) Improve am-pac by 2 to demonstrate functional progress and return to baseline function/reduced caregiver burden. 6) Negotiate stairs using most appropriate technique and S in order to be able to negotiate safely in home environment.   Plan   Treatment/Interventions Functional transfer training;LE strengthening/ROM;Elevations;Therapeutic exercise;Patient/family training;Equipment eval/education;Bed mobility;Gait training;Compensatory technique education;Continued evaluation;Spoke to nursing;OT   PT Frequency 2-3x/wk   Discharge Recommendation   Rehab Resource Intensity Level, PT III (Minimum Resource Intensity)  (OP PT- vestibular)   Additional Comments declines need for RW, states he has access to wife's if needed   AM-PAC Basic Mobility Inpatient   Turning in Flat Bed Without Bedrails 4   Lying on Back to Sitting on Edge of Flat Bed Without Bedrails 3   Moving Bed to Chair 3   Standing Up From Chair Using Arms 3   Walk in Room 3   Climb 3-5 Stairs With Railing 3   Basic Mobility Inpatient Raw Score 19   Basic Mobility Standardized Score 42.48   [facility] Highest Level Of Mobility   -HLM Goal 6: Walk 10 steps or more   -HLM Achieved 7: Walk 25 feet or more   End of Consult   Patient Position at End of Consult Supine;All needs within reach   History: co - morbidities including age, 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

## 2025-04-11 NOTE — ED PROVIDER NOTES
Time reflects when diagnosis was documented in both MDM as applicable and the Disposition within this note       Time User Action Codes Description Comment    4/11/2025  3:30 AM Hector Reardon Add [R42] Dizziness     4/11/2025  3:30 AM Hector Reardon Add [R29.90] Stroke-like symptoms           ED Disposition       ED Disposition   Admit    Condition   Stable    Date/Time   Fri Apr 11, 2025  4:48 AM    Comment   Case was discussed with SLIM and the patient's admission status was agreed to be Admission Status: observation status to the service of Dr. Hernandez .               Assessment & Plan       Medical Decision Making  Patient with history as below presented with dizziness. History obtained from patient.    Differential diagnosis includes: TIA, CVA, peripheral vertigo, arrhythmia    Plan: Stroke alert and labs, meclizine, Zofran    I considered the patient's chronic medical conditions including their aortic stenosis in forming my differential diagnosis, plan, and my medical decision making.    ED summary: ECG independently interpreted by myself with nonspecific ST depressions without ST elevations as below. Labs reviewed and unremarkable. Independently reviewed imaging without acute emergent pathology.  Patient reassessed and with resolution of his left lower extremity weakness, no longer having difficulty with finger-to-nose, some improvement in his lightheadedness although continues to have some nausea, continues to deny any chest pain, abdominal pain, back pain.  Because of improvement in symptoms, patient not a candidate for thrombolytics.  Discussed patient's management with medicine who agreed to admit patient under their service.    Amount and/or Complexity of Data Reviewed  Labs: ordered.  Radiology: ordered.    Risk  OTC drugs.  Prescription drug management.  Decision regarding hospitalization.        ED Course as of 04/11/25 0517   Fri Apr 11, 2025   0342 Procedure Note: EKG  Date/Time: 04/11/25 3:42 AM    Interpreted by: Hector Reardon   Indications / Diagnosis: stroke alert  ECG reviewed by me, the ED Provider: yes   The EKG demonstrates:  Rhythm: normal sinus  Intervals: normal intervals  Axis: normal axis  QRS/Blocks: normal QRS  ST Changes: diffuse STD, no CHAR   0448 Patient determined not to be a thrombolytic candidate given his improvement in neurologic symptoms.       Medications   metoclopramide (REGLAN) injection 10 mg (has no administration in time range)   ondansetron (ZOFRAN) injection 4 mg (has no administration in time range)   pantoprazole (PROTONIX) injection 40 mg (has no administration in time range)   meclizine (ANTIVERT) tablet 50 mg (50 mg Oral Given 4/11/25 0405)   iohexol (OMNIPAQUE) 350 MG/ML injection (MULTI-DOSE) 90 mL (90 mL Intravenous Given 4/11/25 6915)   ondansetron (ZOFRAN) injection 4 mg (4 mg Intravenous Given 4/11/25 7884)   aspirin chewable tablet 324 mg (324 mg Oral Given 4/11/25 8119)       ED Risk Strat Scores         Stroke Assessment       Row Name 04/11/25 0336             NIH Stroke Scale    Interval Baseline      Level of Consciousness (1a.) 0      LOC Questions (1b.) 0      LOC Commands (1c.) 0      Best Gaze (2.) 0      Visual (3.) 0      Facial Palsy (4.) 0      Motor Arm, Left (5a.) 0      Motor Arm, Right (5b.) 0      Motor Leg, Left (6a.) 1      Motor Leg, Right (6b.) 0      Limb Ataxia (7.) 1      Sensory (8.) 0      Best Language (9.) 0      Dysarthria (10.) 0      Extinction and Inattention (11.) (Formerly Neglect) 0      Total 2                    Flowsheet Row Most Recent Value   Thrombolytic Decision Options    Thrombolytic Decision Patient not a candidate.   Patient is not a candidate options Symptoms resolved/clearly non disabling.                    No data recorded                            History of Present Illness       Chief Complaint   Patient presents with    Dizziness     Dizziness causing n/v. HTN hx. Pt states has double vision.       Past  Medical History:   Diagnosis Date    Arthritis 2000    Basal cell carcinoma     Head    Depression 1990    GERD (gastroesophageal reflux disease) 1990    Hyperlipemia     Hypertension     Positive fecal occult blood test     Psoriasis     Rectal bleeding     Visual impairment 1950    corrected      Past Surgical History:   Procedure Laterality Date    BASAL CELL CARCINOMA EXCISION      COLONOSCOPY      COLONOSCOPY  2019    HERNIA REPAIR  2012    Inguinal    LIPOMA RESECTION      Scalp    SIGMOIDOSCOPY        Family History   Problem Relation Age of Onset    Hypertension Father     Kidney disease Father     Thrombosis Mother         cause of death      Social History     Tobacco Use    Smoking status: Former     Current packs/day: 0.00     Average packs/day: 0.3 packs/day for 15.0 years (3.8 ttl pk-yrs)     Types: Pipe, Cigarettes     Start date:      Quit date: 1985     Years since quittin.8    Smokeless tobacco: Never   Vaping Use    Vaping status: Never Used   Substance Use Topics    Alcohol use: Yes     Alcohol/week: 2.0 standard drinks of alcohol     Types: 2 Glasses of wine per week     Comment: per week    Drug use: Never      E-Cigarette/Vaping    E-Cigarette Use Never User       E-Cigarette/Vaping Substances      I have reviewed and agree with the history as documented.     Patient is a 89-year-old male with significant past medical history of aortic stenosis, nonsustained ventricular tachycardia, presenting for evaluation of lightheadedness.  Patient reports that he went to bed at 10 PM feeling his normal state of health.  He awoke at 1 AM to use the restroom and was also feeling normal.  He went back to sleep and woke up shortly after 2 AM, approximately 1.5-hour prior to arrival, with a sensation of lightheadedness.  He does not describe the room spinning dizziness.  He denies any headaches.  He denies any injuries to the head or neck.  He has some nausea with associated  vomiting but is denying any chest pain, abdominal pain.  He denies any back pain.  He denies any change in sensation.  He does have some mild left lower extremity weakness on clinical exam that he says that he was not aware of prior to being examined.  He is otherwise without acute complaint.        Review of Systems   Respiratory:  Negative for shortness of breath.    Cardiovascular:  Negative for chest pain.   Gastrointestinal:  Positive for nausea and vomiting. Negative for abdominal pain.   Musculoskeletal:  Negative for back pain.   Neurological:  Positive for light-headedness. Negative for dizziness and headaches.           Objective       ED Triage Vitals   Temp Pulse Blood Pressure Respirations SpO2 Patient Position - Orthostatic VS   -- 04/11/25 0325 04/11/25 0330 04/11/25 0325 04/11/25 0325 04/11/25 0325    95 128/70 18 98 % Sitting      Temp src Heart Rate Source BP Location FiO2 (%) Pain Score    -- 04/11/25 0330 04/11/25 0345 -- --     Monitor Left arm        Vitals      Date and Time Temp Pulse SpO2 Resp BP Pain Score FACES Pain Rating User   04/11/25 0500 -- 94 93 % 19 137/61 -- -- EG   04/11/25 0445 -- 106 97 % 20 152/84 -- -- EG   04/11/25 0415 -- 97 93 % 17 145/71 -- -- EG   04/11/25 0400 -- 97 97 % 14 148/76 -- -- EG   04/11/25 0345 -- 100 99 % 25 148/72 -- -- AS   04/11/25 0330 -- 94 96 % 18 128/70 -- -- EG   04/11/25 0325 -- 95 98 % 18 -- -- -- EG            Physical Exam  Vitals and nursing note reviewed.   Constitutional:       General: He is not in acute distress.     Appearance: Normal appearance. He is not ill-appearing or toxic-appearing.   HENT:      Head: Normocephalic and atraumatic.      Comments: HINTS: Leftward horizontal beating nystagmus, abnormal corrective saccade, no vertical skew deviation     Right Ear: External ear normal.      Left Ear: External ear normal.      Nose: Nose normal.      Mouth/Throat:      Mouth: Mucous membranes are moist.   Eyes:      General: No visual  field deficit or scleral icterus.        Right eye: No discharge.         Left eye: No discharge.      Extraocular Movements: Extraocular movements intact.      Conjunctiva/sclera: Conjunctivae normal.      Pupils: Pupils are equal, round, and reactive to light.   Cardiovascular:      Rate and Rhythm: Normal rate and regular rhythm.      Pulses: Normal pulses.      Heart sounds: Normal heart sounds. No murmur heard.     No friction rub. No gallop.   Pulmonary:      Effort: Pulmonary effort is normal. No respiratory distress.      Breath sounds: Normal breath sounds. No wheezing, rhonchi or rales.   Abdominal:      General: Abdomen is flat. There is no distension.      Palpations: Abdomen is soft. There is no mass.      Tenderness: There is no abdominal tenderness.   Genitourinary:     Comments: Deferred  Musculoskeletal:      Right lower leg: No edema.      Left lower leg: No edema.   Skin:     General: Skin is warm and dry.   Neurological:      General: No focal deficit present.      Mental Status: He is alert and oriented to person, place, and time.      GCS: GCS eye subscore is 4. GCS verbal subscore is 5. GCS motor subscore is 6.      Cranial Nerves: No cranial nerve deficit, dysarthria or facial asymmetry.      Sensory: Sensation is intact. No sensory deficit.      Motor: Weakness present. No pronator drift.      Coordination: Finger-Nose-Finger Test abnormal.      Comments: Patient with 4+/5 strength of the left lower extremity.  Able to hold up against gravity, however is weaker than the right lower extremity.  No obvious pronator drift of this extremity.  Patient with slight difficulty with finger-to-nose but able to eventually touch finger-to-nose with effort.   Psychiatric:         Mood and Affect: Mood normal.         Results Reviewed       Procedure Component Value Units Date/Time    B-Type Natriuretic Peptide(BNP) [[id number]]     Lab Status: No result Specimen: Blood     Protime-INR [[id number]]   (Normal) Collected: 04/11/25 0331    Lab Status: Final result Specimen: Blood from Arm, Left Updated: 04/11/25 0410     Protime 14.0 seconds      INR 1.06    Narrative:      INR Therapeutic Range    Indication                                             INR Range      Atrial Fibrillation                                               2.0-3.0  Hypercoagulable State                                    2.0.2.3  Left Ventricular Asist Device                            2.0-3.0  Mechanical Heart Valve                                  -    Aortic(with afib, MI, embolism, HF, LA enlargement,    and/or coagulopathy)                                     2.0-3.0 (2.5-3.5)     Mitral                                                             2.5-3.5  Prosthetic/Bioprosthetic Heart Valve               2.0-3.0  Venous thromboembolism (VTE: VT, PE        2.0-3.0    APTT [[id number]]  (Normal) Collected: 04/11/25 0331    Lab Status: Final result Specimen: Blood from Arm, Left Updated: 04/11/25 0410     PTT 31 seconds     HS Troponin I 4hr [[id number]]     Lab Status: No result Specimen: Blood     HS Troponin 0hr (reflex protocol) [[id number]]  (Normal) Collected: 04/11/25 0331    Lab Status: Final result Specimen: Blood from Arm, Left Updated: 04/11/25 0358     hs TnI 0hr 9 ng/L     HS Troponin I 2hr [[id number]]     Lab Status: No result Specimen: Blood     Basic metabolic panel [[id number]]  (Abnormal) Collected: 04/11/25 0331    Lab Status: Final result Specimen: Blood from Arm, Left Updated: 04/11/25 0349     Sodium 139 mmol/L      Potassium 3.6 mmol/L      Chloride 108 mmol/L      CO2 19 mmol/L      ANION GAP 12 mmol/L      BUN 31 mg/dL      Creatinine 1.42 mg/dL      Glucose 184 mg/dL      Calcium 8.9 mg/dL      eGFR 43 ml/min/1.73sq m     Narrative:      National Kidney Disease Foundation guidelines for Chronic Kidney Disease (CKD):     Stage 1 with normal or high GFR (GFR > 90 mL/min/1.73 square meters)    Stage 2 Mild CKD (GFR =  60-89 mL/min/1.73 square meters)    Stage 3A Moderate CKD (GFR = 45-59 mL/min/1.73 square meters)    Stage 3B Moderate CKD (GFR = 30-44 mL/min/1.73 square meters)    Stage 4 Severe CKD (GFR = 15-29 mL/min/1.73 square meters)    Stage 5 End Stage CKD (GFR <15 mL/min/1.73 square meters)  Note: GFR calculation is accurate only with a steady state creatinine    CBC and Platelet [[id number]]  (Abnormal) Collected: 04/11/25 0331    Lab Status: Final result Specimen: Blood from Arm, Left Updated: 04/11/25 0336     WBC 10.85 Thousand/uL      RBC 4.72 Million/uL      Hemoglobin 13.6 g/dL      Hematocrit 41.5 %      MCV 88 fL      MCH 28.8 pg      MCHC 32.8 g/dL      RDW 14.1 %      Platelets 199 Thousands/uL      MPV 11.0 fL     Fingerstick Glucose (POCT) [[id number]]  (Abnormal) Collected: 04/11/25 0330    Lab Status: Final result Specimen: Blood Updated: 04/11/25 0331     POC Glucose 163 mg/dl             CTA stroke alert (head/neck)   Final Interpretation by Troy Lord MD (04/11 0414)      No evidence for acute large vessel occlusive disease.   Short segment high-grade stenosis at the proximal mid intradural right vertebral artery secondary to calcified and noncalcified atheromatous plaque.   Focal moderate stenosis at the right ICA distal supraclinoid segment.      Moderate stenosis at the bilateral ICA origins secondary to calcified and noncalcified atheromatous plaquing, left worse than right (approximately 50 to 60% on the right and 60 to 70% on the left based on NASCET criteria).         Findings were directly discussed with Dr. Gallo at 4:04 a.m.      Workstation performed: [id number]         CT stroke alert brain   Final Interpretation by Troy Lord MD (04/11 0415)      No acute vascular territorial infarct, hemorrhage, or focal mass effect/midline shift.  Chronic microangiopathic changes.      Findings were directly discussed with Dr. Gallo at approximately 4:04 a.m.      Workstation performed: [id number]          XR chest 1 view portable    (Results Pending)       Procedures    ED Medication and Procedure Management   Prior to Admission Medications   Prescriptions Last Dose Informant Patient Reported? Taking?   Multiple Vitamin (multivitamin) tablet  Self Yes No   Sig: Take 1 tablet by mouth daily   acetaminophen (TYLENOL) 650 mg CR tablet  Self Yes No   Sig: Take 650 mg by mouth every 8 (eight) hours as needed for mild pain For arthritis pain   alfuzosin (UROXATRAL) 10 mg 24 hr tablet  Self No No   Sig: TAKE 1 TABLET BY MOUTH DAILY   amLODIPine (NORVASC) 10 mg tablet  Self No No   Sig: TAKE 1 TABLET BY MOUTH EVERY DAY   metoprolol succinate (TOPROL-XL) 25 mg 24 hr tablet  Self No No   Sig: Take 1 tablet (25 mg total) by mouth daily In AM      Facility-Administered Medications: None     Patient's Medications   Discharge Prescriptions    No medications on file     No discharge procedures on file.  ED SEPSIS DOCUMENTATION   Time reflects when diagnosis was documented in both MDM as applicable and the Disposition within this note       Time User Action Codes Description Comment    4/11/2025  3:30 AM Hector Reardon [R42] Dizziness     4/11/2025  3:30 AM Hector Reardon [R29.90] Stroke-like symptoms                  Hector Reardon, DO  04/11/25 0517

## 2025-04-12 ENCOUNTER — APPOINTMENT (OUTPATIENT)
Dept: MRI IMAGING | Facility: HOSPITAL | Age: 89
End: 2025-04-12
Payer: COMMERCIAL

## 2025-04-12 VITALS
HEIGHT: 64 IN | RESPIRATION RATE: 18 BRPM | BODY MASS INDEX: 26.53 KG/M2 | SYSTOLIC BLOOD PRESSURE: 153 MMHG | DIASTOLIC BLOOD PRESSURE: 75 MMHG | TEMPERATURE: 97.6 F | HEART RATE: 69 BPM | WEIGHT: 155.42 LBS | OXYGEN SATURATION: 97 %

## 2025-04-12 DIAGNOSIS — N18.32 STAGE 3B CHRONIC KIDNEY DISEASE (HCC): Primary | ICD-10-CM

## 2025-04-12 LAB
ANION GAP SERPL CALCULATED.3IONS-SCNC: 7 MMOL/L (ref 4–13)
BUN SERPL-MCNC: 25 MG/DL (ref 5–25)
CALCIUM SERPL-MCNC: 8.9 MG/DL (ref 8.4–10.2)
CHLORIDE SERPL-SCNC: 109 MMOL/L (ref 96–108)
CO2 SERPL-SCNC: 24 MMOL/L (ref 21–32)
CREAT SERPL-MCNC: 1.46 MG/DL (ref 0.6–1.3)
ERYTHROCYTE [DISTWIDTH] IN BLOOD BY AUTOMATED COUNT: 14.2 % (ref 11.6–15.1)
GFR SERPL CREATININE-BSD FRML MDRD: 42 ML/MIN/1.73SQ M
GLUCOSE P FAST SERPL-MCNC: 97 MG/DL (ref 65–99)
GLUCOSE SERPL-MCNC: 97 MG/DL (ref 65–140)
HCT VFR BLD AUTO: 41.6 % (ref 36.5–49.3)
HGB BLD-MCNC: 13.9 G/DL (ref 12–17)
MAGNESIUM SERPL-MCNC: 2.2 MG/DL (ref 1.9–2.7)
MCH RBC QN AUTO: 29.3 PG (ref 26.8–34.3)
MCHC RBC AUTO-ENTMCNC: 33.4 G/DL (ref 31.4–37.4)
MCV RBC AUTO: 88 FL (ref 82–98)
PLATELET # BLD AUTO: 196 THOUSANDS/UL (ref 149–390)
PMV BLD AUTO: 10.9 FL (ref 8.9–12.7)
POTASSIUM SERPL-SCNC: 3.8 MMOL/L (ref 3.5–5.3)
RBC # BLD AUTO: 4.75 MILLION/UL (ref 3.88–5.62)
SODIUM SERPL-SCNC: 140 MMOL/L (ref 135–147)
WBC # BLD AUTO: 10.91 THOUSAND/UL (ref 4.31–10.16)

## 2025-04-12 PROCEDURE — 99214 OFFICE O/P EST MOD 30 MIN: CPT | Performed by: INTERNAL MEDICINE

## 2025-04-12 PROCEDURE — 80048 BASIC METABOLIC PNL TOTAL CA: CPT | Performed by: INTERNAL MEDICINE

## 2025-04-12 PROCEDURE — 85027 COMPLETE CBC AUTOMATED: CPT | Performed by: INTERNAL MEDICINE

## 2025-04-12 PROCEDURE — 99239 HOSP IP/OBS DSCHRG MGMT >30: CPT | Performed by: INTERNAL MEDICINE

## 2025-04-12 PROCEDURE — 99232 SBSQ HOSP IP/OBS MODERATE 35: CPT | Performed by: PSYCHIATRY & NEUROLOGY

## 2025-04-12 PROCEDURE — 70551 MRI BRAIN STEM W/O DYE: CPT

## 2025-04-12 PROCEDURE — NC001 PR NO CHARGE: Performed by: INTERNAL MEDICINE

## 2025-04-12 RX ORDER — CLOPIDOGREL BISULFATE 75 MG/1
300 TABLET ORAL ONCE
Status: COMPLETED | OUTPATIENT
Start: 2025-04-12 | End: 2025-04-12

## 2025-04-12 RX ORDER — CLOPIDOGREL BISULFATE 75 MG/1
75 TABLET ORAL DAILY
Qty: 21 TABLET | Refills: 0 | Status: SHIPPED | OUTPATIENT
Start: 2025-04-13 | End: 2025-05-04

## 2025-04-12 RX ORDER — ATORVASTATIN CALCIUM 40 MG/1
40 TABLET, FILM COATED ORAL EVERY EVENING
Qty: 30 TABLET | Refills: 0 | Status: SHIPPED | OUTPATIENT
Start: 2025-04-12 | End: 2025-04-15

## 2025-04-12 RX ORDER — MECLIZINE HYDROCHLORIDE 25 MG/1
25 TABLET ORAL EVERY 8 HOURS PRN
Qty: 30 TABLET | Refills: 0 | Status: SHIPPED | OUTPATIENT
Start: 2025-04-12

## 2025-04-12 RX ORDER — PANTOPRAZOLE SODIUM 40 MG/1
40 TABLET, DELAYED RELEASE ORAL DAILY
Qty: 30 TABLET | Refills: 0 | Status: SHIPPED | OUTPATIENT
Start: 2025-04-12 | End: 2025-05-12

## 2025-04-12 RX ORDER — PANTOPRAZOLE SODIUM 40 MG/1
40 TABLET, DELAYED RELEASE ORAL
Status: DISCONTINUED | OUTPATIENT
Start: 2025-04-12 | End: 2025-04-12 | Stop reason: HOSPADM

## 2025-04-12 RX ORDER — CLOPIDOGREL BISULFATE 75 MG/1
75 TABLET ORAL DAILY
Status: DISCONTINUED | OUTPATIENT
Start: 2025-04-13 | End: 2025-04-12 | Stop reason: HOSPADM

## 2025-04-12 RX ORDER — ASPIRIN 81 MG/1
81 TABLET, CHEWABLE ORAL DAILY
Qty: 30 TABLET | Refills: 0 | Status: SHIPPED | OUTPATIENT
Start: 2025-04-13 | End: 2025-05-13

## 2025-04-12 RX ADMIN — ATORVASTATIN CALCIUM 40 MG: 40 TABLET, FILM COATED ORAL at 16:47

## 2025-04-12 RX ADMIN — METOPROLOL SUCCINATE 25 MG: 25 TABLET, EXTENDED RELEASE ORAL at 09:33

## 2025-04-12 RX ADMIN — PANTOPRAZOLE SODIUM 40 MG: 40 TABLET, DELAYED RELEASE ORAL at 16:30

## 2025-04-12 RX ADMIN — ASPIRIN 81 MG: 81 TABLET, CHEWABLE ORAL at 09:33

## 2025-04-12 RX ADMIN — HEPARIN SODIUM 5000 UNITS: 5000 INJECTION INTRAVENOUS; SUBCUTANEOUS at 07:40

## 2025-04-12 RX ADMIN — CLOPIDOGREL 300 MG: 75 TABLET ORAL at 13:12

## 2025-04-12 RX ADMIN — PANTOPRAZOLE SODIUM 40 MG: 40 INJECTION, POWDER, LYOPHILIZED, FOR SOLUTION INTRAVENOUS at 09:33

## 2025-04-12 RX ADMIN — HEPARIN SODIUM 5000 UNITS: 5000 INJECTION INTRAVENOUS; SUBCUTANEOUS at 13:13

## 2025-04-12 RX ADMIN — TAMSULOSIN HYDROCHLORIDE 0.4 MG: 0.4 CAPSULE ORAL at 16:47

## 2025-04-12 NOTE — PROGRESS NOTES
"Progress Note - Cardiology   Name: Christian Jones 89 y.o. male I MRN: [id number]  Unit/Bed#: E4 -01 I Date of Admission: 4/11/2025   Date of Service: 4/12/2025 I Hospital Day: 0    Assessment & Plan  Stroke-like symptoms  Patient presented to the ED after arising this morning having double vision, dizziness and lower left extremity weakness.  Patient states that the double vision and dizziness are improved but not gone.  The lower extremity weakness has resolved.  Patient deemed not a candidate for TNK  Was loaded with aspirin 325 mg with aspirin 81 mg daily to follow  Was begun on atorvastatin 40 mg daily  CT of the head demonstrating \"No acute vascular territorial infarct, hemorrhage, or focal mass effect/midline shift. Chronic microangiopathic changes.\"   CTA of the head and neck demonstrating  No evidence of acute large vessel occlusive disease  Short segment high-grade stenosis of the proximal mid intradural right vertebral artery secondary to calcified and noncalcified atheromatous plaque  Focal moderate stenosis at the right internal carotid artery distal supraclinoid segment  Moderate stenosis at the bilateral internal carotid artery origins secondary to calcified and noncalcified atheromatous plaque left worse than right (right approximately 50 to 60%, left approximately 60 to 70%.  MRI pending  Telemetry monitor: Sinus tachycardia at a rate of 104 bpm with occasional PVCs with short runs of nonsustained VT.  No atrial fibrillation.  Echo 4/11/2025    Left Ventricle: Left ventricular cavity size is normal. There is mild asymmetric hypertrophy of the basal septal wall. The left ventricular ejection fraction is 65% by visual estimation. Systolic function is normal. Wall motion is normal. Diastolic function is mildly abnormal, consistent with grade I (abnormal) relaxation.    Right Ventricle: Right ventricular cavity size is normal. Systolic function is normal.    Left Atrium: The atrium is mildly " dilated.    Aortic Valve: The leaflets are moderately thickened. The leaflets are moderately calcified. There is moderate stenosis with Vmax 3.24 m/s, TESSIE 1.02 cm2, DVI 0.30.    Mitral Valve: There is trace regurgitation.    Tricuspid Valve: There is mild regurgitation. Pulmonary artery systolic pressures are estimated at 26 mmHg.    Compared to report from February 13, 2025, aortic stenosis appears to be moderate.  Abnormal EKG  Sinus tachycardia with frequent premature ventricular contractions and nonspecific ST-T wave abnormalities in leads I, 2, 3, V3 through V6 which were worse than seen on office EKG from January 2025.  These EKG changes are nonspecific in nature and not necessarily ischemic.  Troponins x 3 normal  Patient did not have chest pain  Patient is not short of breath.  On examination there are bibasilar Rales which improved with coughing  Chest x-ray demonstrates no acute cardiopulmonary disease.  There is no evidence for pulmonary edema.  Patient is euvolemic on examination  Sinus tachycardia resolved following IV fluid hydration  CKD (chronic kidney disease) stage 3, GFR 30-59 ml/min ([facility])  Lab Results   Component Value Date    EGFR 42 04/12/2025    EGFR 43 04/11/2025    EGFR 43 04/09/2025    CREATININE 1.46 (H) 04/12/2025    CREATININE 1.42 (H) 04/11/2025    CREATININE 1.44 (H) 04/09/2025   Baseline creatinine 1.20-1.27  In review of CTA with contrast, will begin normal saline at 75 cc an hour for 12 hrs to reduce the risk of contrast-induced nephropathy  Aortic stenosis  Followed by outpatient cardiovascular surgery and is on a path for TAVR.  Patient to have a cardiac catheterization on 4/15 which probably should be delayed particularly since patient received IV contrast for CTA of the head and neck.  Will ask neurology for their opinion regarding cardiac catheterization and follow his renal function up to discharge.  Essential hypertension  Blood pressure 114  to 142 systolic today   Would not  resume amlodipine at this time  NSVT (nonsustained ventricular tachycardia) (HCC)  Noted on previous Holter monitor and noted on present telemetry  Initiated on metoprolol succinate 25 mg daily on 2/23/2025 and will continue    Interval history:  Patient ready for discharge.  Discussed patient with neurology regarding cardiac catheterization on 4/15/2025, Tuesday.  Neurology stated that his stroke was very small and we could go ahead with the catheterization on Tuesday.  Discussed patient's slight deterioration in renal function with Dr. Marlene Carney and she requested that patient have a BMP drawn on Monday and then they plan to go ahead with the cardiac catheterization on Tuesday.  Patient informed of the plan    Plan:  Discharged today  Nonfasting BMP on Monday  Cardiac catheterization on Tuesday  Discharge medications: Aspirin 81 mg daily, Plavix 75 mg daily, atorvastatin 40 mg daily, metoprolol succinate 25 mg daily    PROBLEM LIST:    Patient Active Problem List    Diagnosis Date Noted    Essential hypertension 04/11/2025    Stroke-like symptoms 04/11/2025    Abnormal EKG 04/11/2025    NSVT (nonsustained ventricular tachycardia) (HCC) 04/11/2025    Pulmonary edema 04/11/2025    Aortic stenosis 01/20/2025    Dyspnea on exertion 01/20/2025    Peripheral vascular disease (HCC) 09/13/2024    Depression, recurrent (HCC) 02/13/2023    CKD (chronic kidney disease) stage 3, GFR 30-59 ml/min (HCC) 02/07/2022    Rectal bleeding 03/20/2019       PREVIOUS WEIGHTS:   Weight (last 2 days)       Date/Time Weight    04/12/25 0600 70.5 (155.42)    04/11/25 0835 70.3 (154.98)    04/11/25 0323 70.3 (154.98)            Wt Readings from Last 2 Encounters:   04/12/25 70.5 kg (155 lb 6.8 oz)   03/21/25 71.1 kg (156 lb 12.8 oz)         Intake/Output Summary (Last 24 hours) at 4/12/2025 1409  Last data filed at 4/12/2025 1318  Gross per 24 hour   Intake 320 ml   Output 800 ml   Net -480 ml       Labs/Data:        Results from last 7  days   Lab Units 04/12/25 0718 04/11/25 0622 04/11/25 0331   WBC Thousand/uL 10.91* 11.93* 10.85*   HEMOGLOBIN g/dL 13.9 12.8 13.6   HEMATOCRIT % 41.6 39.1 41.5   MCV fL 88 90 88   MCH pg 29.3 29.5 28.8   MCHC g/dL 33.4 32.7 32.8   PLATELETS Thousands/uL 196 178 199     Results from last 7 days   Lab Units 04/12/25 0718 04/11/25 0331 04/09/25  1331   EGFR ml/min/1.73sq m 42 43 43   SODIUM mmol/L 140 139 138   POTASSIUM mmol/L 3.8 3.6 4.5   CHLORIDE mmol/L 109* 108 105   CO2 mmol/L 24 19* 24   BUN mg/dL 25 31* 26*   CREATININE mg/dL 1.46* 1.42* 1.44*     Results from last 7 days   Lab Units 04/12/25  0718 04/11/25  0622 04/11/25 0331 04/09/25  1331   CALCIUM mg/dL 8.9  --  8.9 8.9   AST U/L  --   --   --  14   ALT U/L  --   --   --  17   ALK PHOS U/L  --   --   --  69   MAGNESIUM mg/dL  --  2.2  --   --      Lab Results   Component Value Date    CHOLESTEROL 169 04/11/2025    TRIG 100 04/11/2025    HDL 45 04/11/2025    LDLCALC 104 (H) 04/11/2025    HGBA1C 6.0 (H) 04/11/2025     Results from last 7 days   Lab Units 04/11/25 0331   INR  1.06   PROTIME seconds 14.0          Current Facility-Administered Medications:     acetaminophen (TYLENOL) tablet 650 mg, 650 mg, Oral, Q6H PRN, Christos Hays PA-C    [Held by provider] amLODIPine (NORVASC) tablet 10 mg, 10 mg, Oral, Daily, Christos Hays PA-C    aspirin chewable tablet 81 mg, 81 mg, Oral, Daily, Christos Hays PA-C, 81 mg at 04/12/25 0933    atorvastatin (LIPITOR) tablet 40 mg, 40 mg, Oral, QPM, Christos Hays PA-C, 40 mg at 04/11/25 1750    [START ON 4/13/2025] clopidogrel (PLAVIX) tablet 75 mg, 75 mg, Oral, Daily, Sonali Siddiqui PA-C    heparin (porcine) subcutaneous injection 5,000 Units, 5,000 Units, Subcutaneous, Q8H ELSY, Christos Hays PA-C, 5,000 Units at 04/12/25 1313    ipratropium-albuterol (DUO-NEB) 0.5-2.5 mg/3 mL inhalation solution 3 mL, 3 mL, Nebulization, Q6H PRN, Christos Hays PA-C    meclizine (ANTIVERT) tablet 25 mg, 25  "mg, Oral, Q8H PRN, Christos Hays PA-C    metoclopramide (REGLAN) injection 10 mg, 10 mg, Intravenous, Q6H PRN, Christos Hays PA-C, 10 mg at 04/11/25 1637    metoprolol succinate (TOPROL-XL) 24 hr tablet 25 mg, 25 mg, Oral, Daily, Christos Hays PA-C, 25 mg at 04/12/25 0933    ondansetron (ZOFRAN) injection 4 mg, 4 mg, Intravenous, Q6H PRN, Christos Hays PA-C, 4 mg at 04/11/25 2239    pantoprazole (PROTONIX) EC tablet 40 mg, 40 mg, Oral, BID MAGDIEL, Karen Orantes DO    tamsulosin (FLOMAX) capsule 0.4 mg, 0.4 mg, Oral, Daily With Dinner, Christos Hays PA-C, 0.4 mg at 04/11/25 1637  No Known Allergies    Invasive Devices       Peripheral Intravenous Line  Duration             Peripheral IV 04/11/25 Distal;Right;Upper;Ventral (anterior) Arm 1 day                    Review of Systems   Constitutional:  Negative for activity change.   Respiratory:  Negative for cough, chest tightness, shortness of breath and wheezing.    Cardiovascular:  Negative for chest pain, palpitations and leg swelling.   Musculoskeletal:  Negative for gait problem.   Skin:  Negative for color change.   Neurological:  Negative for dizziness, tremors, syncope, weakness, light-headedness and headaches.   Psychiatric/Behavioral:  Negative for agitation and confusion.        Vitals: /70 (BP Location: Right arm)   Pulse 68   Temp 97.5 °F (36.4 °C) (Temporal)   Resp 18   Ht 5' 4\" (1.626 m)   Wt 70.5 kg (155 lb 6.8 oz)   SpO2 97%   BMI 26.68 kg/m²     Physical Exam  Vitals reviewed.   Constitutional:       General: He is not in acute distress.     Appearance: He is well-developed.   HENT:      Head: Normocephalic and atraumatic.   Neck:      Thyroid: No thyromegaly.      Vascular: No carotid bruit or JVD.      Trachea: No tracheal deviation.   Cardiovascular:      Rate and Rhythm: Normal rate and regular rhythm.      Pulses: Normal pulses.      Heart sounds: Murmur heard.      Crescendo decrescendo systolic murmur is present " "with a grade of 2/6.      No friction rub. No gallop.   Pulmonary:      Effort: Pulmonary effort is normal. No respiratory distress.      Breath sounds: Normal breath sounds. No wheezing, rhonchi or rales.   Chest:      Chest wall: No tenderness.   Musculoskeletal:      Cervical back: Normal range of motion and neck supple.      Right lower leg: No edema.      Left lower leg: No edema.   Skin:     General: Skin is warm and dry.   Neurological:      General: No focal deficit present.      Mental Status: He is alert and oriented to person, place, and time.   Psychiatric:         Mood and Affect: Mood normal.         Behavior: Behavior normal.         Thought Content: Thought content normal.         Judgment: Judgment normal.             Portions of the record may have been created with voice recognition software. Occasional wrong word or \"sound a like\" substitutions may have occurred due to the inherent limitations of voice recognition software. Read the chart carefully and recognize, using context, where substitutions have occurred.    ======================================================      Imaging Results Review: No pertinent imaging studies reviewed.  Other Study Results Review: No additional pertinent studies reviewed.    "

## 2025-04-12 NOTE — ASSESSMENT & PLAN NOTE
Lab Results   Component Value Date    EGFR 42 04/12/2025    EGFR 43 04/11/2025    EGFR 43 04/09/2025    CREATININE 1.46 (H) 04/12/2025    CREATININE 1.42 (H) 04/11/2025    CREATININE 1.44 (H) 04/09/2025     -Medical management per primary team

## 2025-04-12 NOTE — ASSESSMENT & PLAN NOTE
Lab Results   Component Value Date    EGFR 42 04/12/2025    EGFR 43 04/11/2025    EGFR 43 04/09/2025    CREATININE 1.46 (H) 04/12/2025    CREATININE 1.42 (H) 04/11/2025    CREATININE 1.44 (H) 04/09/2025   Approximately at baseline  Baseline appears to be 1.2-1.4  Continue to monitor BMP

## 2025-04-12 NOTE — PROGRESS NOTES
"Progress Note - Hospitalist   Name: Christian Jones 89 y.o. male I MRN: [id number]  Unit/Bed#: E4 -01 I Date of Admission: 4/11/2025   Date of Service: 4/12/2025 I Hospital Day: 0    Assessment & Plan  Stroke-like symptoms  Pt presented to the ED secondary to new onset dizziness, N/V, and left lower extremity weakness. Last known normal approx 1am.  Stroke alert called. NIHSS 2. Secondary to improving symptoms while in the ED, pt deemed not to be TNK candidate. Pt loaded with aspirin in the ED.  CT head demonstrating \"No acute vascular territorial infarct, hemorrhage, or focal mass effect/midline shift. Chronic microangiopathic changes.\"  CTA head and neck demonstrating   No evidence for acute large vessel occlusive disease.  Short segment high-grade stenosis at the proximal mid intradural right vertebral artery secondary to calcified and noncalcified atheromatous plaque.  Focal moderate stenosis at the right ICA distal supraclinoid segment.  Moderate stenosis at the bilateral ICA origins secondary to calcified and noncalcified atheromatous plaquing, left worse than right (approximately 50 to 60% on the right and 60 to 70% on the left based on NASCET criteria).  Monitor patient on the stroke pathway for now   MRI brain negative    Continue neuro checks  Monitor on telemetry  ECHO reviewed   Flp, hgba1c reviewed   Appreciate neurology recommendations   Symptoms have resolved.   Despite mri is negative symptoms highly indicative of a stroke per neurology- will await attending eval   Abnormal EKG  Pt noted to have mild diffuse ST depressions in leads II, III, V4-V6, new compared to EKG on 1/24/2025  Pt with dizziness and N/V noted above, but denies any chest pain, palpitations, diaphoresis, shortness of breath, or back pain  Initial trop 9, continue to trend  Monitor on telemetry  ECHO reviewed   Appreciate cardiology recommendations   Pulmonary edema  Pt noted to have coarse rales with diffuse expiratory " wheezing on exam  CXR demonstrating pulm edema, official read pending     Last ECHO 2/2025 EF 55% grade I diastolic dysfunction  Pt does not otherwise appear volume overloaded on exam  Not maintained on diuretics outpatient   S/p 40mg iv lasix  No further diuresis were recommended and pt was treated with short course of fluids to reduce the risk of dehydration  Appreciate cardiology recommendations   CKD (chronic kidney disease) stage 3, GFR 30-59 ml/min ([facility])  Lab Results   Component Value Date    EGFR 42 04/12/2025    EGFR 43 04/11/2025    EGFR 43 04/09/2025    CREATININE 1.46 (H) 04/12/2025    CREATININE 1.42 (H) 04/11/2025    CREATININE 1.44 (H) 04/09/2025   Approximately at baseline  Baseline appears to be 1.2-1.4  Continue to monitor BMP  Aortic stenosis  Followed outpatient by cardiothoracic surgery, last seen 3/2025, planning for TAVR  Essential hypertension  Hold amlodipine for now to allow permissive HTN  NSVT (nonsustained ventricular tachycardia) ([facility])  Noted on Holter monitoring by cardiology  Initiated on metoprolol 2/23/2025, continue    VTE Pharmacologic Prophylaxis: VTE Score: 4 heparin     Mobility:   Basic Mobility Inpatient Raw Score: 20  JH-HLM Goal: 6: Walk 10 steps or more  JH-HLM Achieved: 6: Walk 10 steps or more    Patient Centered Rounds:  discussed with his nurse       Education and Discussions with Family / Patient: attempted to call family but no answer     Current Length of Stay: 0 day(s)  Current Patient Status: Observation     Discharge Plan: awaiting clearance by cardiology and neurology for discharge     Code Status: Level 1 - Full Code    Subjective   Pt seen and examined. Pt denies further dizziness. No nausea. Tolerated clear liquid diet. Was able to ambulate to bathroom without dizziness.     Objective :  Temp:  [97.2 °F (36.2 °C)-98.4 °F (36.9 °C)] 97.5 °F (36.4 °C)  HR:  [] 68  BP: (114-142)/(65-85) 142/70  Resp:  [16-18] 18  SpO2:  [94 %-97 %] 97 %  O2 Device:  None (Room air)    Body mass index is 26.68 kg/m².     Input and Output Summary (last 24 hours):     Intake/Output Summary (Last 24 hours) at 4/12/2025 1223  Last data filed at 4/12/2025 0612  Gross per 24 hour   Intake 100 ml   Output 800 ml   Net -700 ml       Physical Exam  Constitutional:       Appearance: Normal appearance.   HENT:      Head: Normocephalic and atraumatic.   Eyes:      Extraocular Movements: Extraocular movements intact.      Pupils: Pupils are equal, round, and reactive to light.   Cardiovascular:      Rate and Rhythm: Normal rate and regular rhythm.      Heart sounds: Murmur heard.      No friction rub. No gallop.   Pulmonary:      Effort: Pulmonary effort is normal. No respiratory distress.      Breath sounds: Normal breath sounds. No wheezing, rhonchi or rales.   Abdominal:      General: Bowel sounds are normal. There is no distension.      Palpations: Abdomen is soft.      Tenderness: There is no abdominal tenderness. There is no guarding or rebound.   Neurological:      Mental Status: He is alert and oriented to person, place, and time.       Lines/Drains:    Telemetry:  Telemetry Orders (From admission, onward)               24 Hour Telemetry Monitoring  Continuous x 24 Hours ([facility])        Expiring   Question:  Reason for 24 Hour Telemetry  Answer:  TIA/Suspected CVA/ Confirmed CVA                  Lab Results: I have reviewed the following results:   Results from last 7 days   Lab Units 04/12/25  0718 04/11/25  0622   WBC Thousand/uL 10.91* 11.93*   HEMOGLOBIN g/dL 13.9 12.8   HEMATOCRIT % 41.6 39.1   PLATELETS Thousands/uL 196 178   SEGS PCT %  --  90*   LYMPHO PCT %  --  5*   MONO PCT %  --  5   EOS PCT %  --  0     Results from last 7 days   Lab Units 04/12/25  0718 04/11/25  0331 04/09/25  1331   SODIUM mmol/L 140   < > 138   POTASSIUM mmol/L 3.8   < > 4.5   CHLORIDE mmol/L 109*   < > 105   CO2 mmol/L 24   < > 24   BUN mg/dL 25   < > 26*   CREATININE mg/dL 1.46*   < > 1.44*   ANION  GAP mmol/L 7   < > 9   CALCIUM mg/dL 8.9   < > 8.9   ALBUMIN g/dL  --   --  4.2   TOTAL BILIRUBIN mg/dL  --   --  0.53   ALK PHOS U/L  --   --  69   ALT U/L  --   --  17   AST U/L  --   --  14   GLUCOSE RANDOM mg/dL 97   < >  --     < > = values in this interval not displayed.     Results from last 7 days   Lab Units 04/11/25  0331   INR  1.06     Results from last 7 days   Lab Units 04/11/25  0330   POC GLUCOSE mg/dl 163*     Results from last 7 days   Lab Units 04/11/25  0622   HEMOGLOBIN A1C % 6.0*           Recent Cultures (last 7 days):        Mri brain- reviewed   Other Study Results Review: No additional pertinent studies reviewed.    Last 24 Hours Medication List:     Current Facility-Administered Medications:     acetaminophen (TYLENOL) tablet 650 mg, Q6H PRN    [Held by provider] amLODIPine (NORVASC) tablet 10 mg, Daily    aspirin chewable tablet 81 mg, Daily    atorvastatin (LIPITOR) tablet 40 mg, QPM    clopidogrel (PLAVIX) tablet 300 mg, Once    [START ON 4/13/2025] clopidogrel (PLAVIX) tablet 75 mg, Daily    heparin (porcine) subcutaneous injection 5,000 Units, Q8H ELSY    ipratropium-albuterol (DUO-NEB) 0.5-2.5 mg/3 mL inhalation solution 3 mL, Q6H PRN    meclizine (ANTIVERT) tablet 25 mg, Q8H PRN    metoclopramide (REGLAN) injection 10 mg, Q6H PRN    metoprolol succinate (TOPROL-XL) 24 hr tablet 25 mg, Daily    ondansetron (ZOFRAN) injection 4 mg, Q6H PRN    pantoprazole (PROTONIX) EC tablet 40 mg, BID AC    tamsulosin (FLOMAX) capsule 0.4 mg, Daily With Dinner    Administrative Statements   Today, Patient Was Seen By: Karen Orantes DO

## 2025-04-12 NOTE — ASSESSMENT & PLAN NOTE
Lab Results   Component Value Date    EGFR 42 04/12/2025    EGFR 43 04/11/2025    EGFR 43 04/09/2025    CREATININE 1.46 (H) 04/12/2025    CREATININE 1.42 (H) 04/11/2025    CREATININE 1.44 (H) 04/09/2025   Baseline creatinine 1.20-1.27  In review of CTA with contrast, will begin normal saline at 75 cc an hour for 12 hrs to reduce the risk of contrast-induced nephropathy

## 2025-04-12 NOTE — PROGRESS NOTES
Progress Note - Neurology   Name: Christian Jones 89 y.o. male I MRN: [id number]  Unit/Bed#: E4 -01 I Date of Admission: 4/11/2025   Date of Service: 4/12/2025 I Hospital Day: 0     Assessment & Plan  Stroke-like symptoms  88 y/o male with HTN, HLD, arthritis, depression, GERD, who presented to the ED on 4/11 with lightheadedness. In ED, he was noted to have LLE weakness. BP on presentation 128/70. Stroke alert was initiated in ED. NIHSS 2 per ED- LLE weakness and finger to nose ataxia. Patient was deemed not a TNK candidate.    CT head: No acute vascular territorial infarct, hemorrhage, or focal mass effect/midline shift. Chronic microangiopathic changes.   CTA head and neck:  No evidence for acute large vessel occlusive disease.  Short segment high-grade stenosis at the proximal mid intradural right vertebral artery secondary to calcified and noncalcified atheromatous plaque.  Focal moderate stenosis at the right ICA distal supraclinoid segment.  Moderate stenosis at the bilateral ICA origins secondary to calcified and noncalcified atheromatous plaquing, left worse than right (approximately 50 to 60% on the right and 60 to 70% on the left based on NASCET criteria).  MRI brain wo contrast 4/12/25:   White matter changes suggestive of chronic microangiopathy.  No acute intracranial pathology.  Labs: , A1C 6.0  Echo 4/11/25: L atrium mildly dilated      On neurology evaluation today, patient noted to have BUE dysmetria, horizontal nystagmus with leftward gaze, vertical nystagmus with upward gaze, difficulty with fully abducting L eye . Despite MRI brain with no acute pathology, pt's clinical presentation suggests central cause of pt's symptoms     Plan:  - Stroke pathway  MRI brain wo contrast completed, see above   Echo completed. Recommend outpatient monitoring with Zio Patch or loop recorder.   S/p aspirin 324 mg x 1. Plavix 300 mg x 1 on 4/12/25. Start DAPT with Plavix 75 mg daily and ASA 81 mg  "daily on 4/13/25. Continue DAPT x 21 days, then stop Plavix and continue ASA monotherapy as pt antiplatelet naive PTA.   Atorvastatin 40 mg daily   Goal normotension. Management per primary team   Continue telemetry  PT/OT/ST  Frequent neuro checks. Continue to monitor and notify neurology with any changes.   -S/p Meclizine 50 mg x 1. Currently on Meclizine 25 mg Q8H PRN dizziness   -Medical management and supportive care per primary team. Correction of any metabolic or infectious disturbances.     CKD (chronic kidney disease) stage 3, GFR 30-59 ml/min ([facility])  Lab Results   Component Value Date    EGFR 42 04/12/2025    EGFR 43 04/11/2025    EGFR 43 04/09/2025    CREATININE 1.46 (H) 04/12/2025    CREATININE 1.42 (H) 04/11/2025    CREATININE 1.44 (H) 04/09/2025     -Medical management per primary team  Essential hypertension  -BP on presentation 128/70  -Medical management per primary team    **History and physical examination obtained by attending neurologist. AP in room as witness to history and physical examination obtained and acted solely as a scribe for attending neurologist. This AP did not provide any decision making for this encounter.**      Christian Jones will need follow-up in in 6 weeks with neurovascular team for Other in 60 minute appointment. They will not require outpatient neurological testing.  Message sent to outpatient team.       Subjective   Pt reports he is doing \"good\" today and that his vision is \"okay.\" Pt reports he was able to ambulate with walker today. Pt reports he was a little unsteady while walking. Pt reports he is still sometimes dizzy but that his dizziness is overall decreasing.     Review of Systems   Neurological:  Positive for dizziness.         Objective :  Temp:  [96.8 °F (36 °C)-98.4 °F (36.9 °C)] 97.2 °F (36.2 °C)  HR:  [] 78  BP: (114-147)/(65-87) 132/65  Resp:  [16-22] 18  SpO2:  [93 %-97 %] 95 %  O2 Device: None (Room air)    Physical Exam  Vitals reviewed. " "  Cardiovascular:      Rate and Rhythm: Normal rate.   Pulmonary:      Effort: Pulmonary effort is normal.   Neurological:      Mental Status: He is alert.      Cranial Nerves: Dysarthria present.     Neurological Exam  Mental Status  Alert. Oriented to person, place and time. Orientation: Initially stated year was \"2035\" but able to reorient himself and correctly states \"2025\". Mild dysarthria present. Sporadically. Follows one-step commands. Attention and concentration: Appropriately attends to provider .    Cranial Nerves  CN V: Facial sensation is normal.  CN VII: Full and symmetric facial movement.  CN XII: Tongue midline without atrophy or fasciculations.    - pt with horizontal nystagmus when looking to the L  - pt with vertical nystagmus when looking up  - appears pt unable to fully abduct L eye, but does have some movement of abduction; difficult to fully assess given pronounced nystagmus .    Motor                                               Right                     Left   Shoulder abduction               5                          5  Elbow flexion                         5                          5  Elbow extension                    5                          5  Finger flexion                         5                          5  Hip flexion                              5                          5  Plantarflexion                         5                          5  Dorsiflexion                            5                          5    Sensory  Light touch is normal in upper and lower extremities.     Coordination  Right: +dysmetria. Heel-to-shin normal.Left: Finger-to-nose abnormality: +dysmetria. +subtle dysmetria .        Lab Results: attending neurologist reviewed the following results:    Attending neurologist personally reviewed MRI brain wo contrast, CTH wo contrast, and CTA H/N wwo contrast in [facility] and reviewed associated reports      Other Study Results Review: Other studies reviewed " include: Echo    VTE Pharmacologic Prophylaxis: Heparin    This note was completed in part utilizing Dragon Software.  Grammatical errors, random word insertions, spelling mistakes, and incomplete sentences may be an occasional consequence of this system secondary to software limitations, ambient noise, and hardware issues.  If you have any questions or concerns about the content, text, or information contained within the body of this dictation, please contact the provider for clarification.

## 2025-04-12 NOTE — PROGRESS NOTES
The pantoprazole has / have been converted to Oral per [facility] IV-to-PO Auto-Conversion Protocol for Adults as approved by the Pharmacy and Therapeutics Committee. The patient met all eligible criteria:  1) Age = 18 years old   2) Received at least one dose of the IV form   3) Receiving at least one other scheduled oral/enteral medication   4) Tolerating an oral/enteral diet   and did not have any exclusions:   1) Critical care patient   2) Active GI bleed (IF assessing H2RAs or PPIs)   3) Continuous tube feeding (IF assessing cipro, doxycycline, levofloxacin, minocycline, rifampin, or voriconazole)   4) Receiving PO vancomycin (IF assessing metronidazole)   5) Persistent nausea and/or vomiting   6) Ileus or gastrointestinal obstruction   7) Marylu/nasogastric tube set for continuous suction   8) Specific order not to automatically convert to PO (in the order's comments or if discussed in the most recent Infectious Disease or primary team's progress notes).

## 2025-04-12 NOTE — ASSESSMENT & PLAN NOTE
88 y/o male with HTN, HLD, arthritis, depression, GERD, who presented to the ED on 4/11 with lightheadedness. In ED, he was noted to have LLE weakness. BP on presentation 128/70. Stroke alert was initiated in ED. NIHSS 2 per ED- LLE weakness and finger to nose ataxia. Patient was deemed not a TNK candidate.    CT head: No acute vascular territorial infarct, hemorrhage, or focal mass effect/midline shift. Chronic microangiopathic changes.   CTA head and neck:  No evidence for acute large vessel occlusive disease.  Short segment high-grade stenosis at the proximal mid intradural right vertebral artery secondary to calcified and noncalcified atheromatous plaque.  Focal moderate stenosis at the right ICA distal supraclinoid segment.  Moderate stenosis at the bilateral ICA origins secondary to calcified and noncalcified atheromatous plaquing, left worse than right (approximately 50 to 60% on the right and 60 to 70% on the left based on NASCET criteria).  MRI brain wo contrast 4/12/25:   White matter changes suggestive of chronic microangiopathy.  No acute intracranial pathology.  Labs: , A1C 6.0  Echo 4/11/25: L atrium mildly dilated      On neurology evaluation today, patient noted to have BUE dysmetria, horizontal nystagmus with leftward gaze, vertical nystagmus with upward gaze, difficulty with fully abducting L eye . Despite MRI brain with no acute pathology, pt's clinical presentation suggests central cause of pt's symptoms     Plan:  - Stroke pathway  MRI brain wo contrast completed, see above   Echo completed. Recommend outpatient monitoring with Zio Patch or loop recorder.   S/p aspirin 324 mg x 1. Plavix 300 mg x 1 on 4/12/25. Start DAPT with Plavix 75 mg daily and ASA 81 mg daily on 4/13/25. Continue DAPT x 21 days, then stop Plavix and continue ASA monotherapy as pt antiplatelet naive PTA.   Atorvastatin 40 mg daily   Goal normotension. Management per primary team   Continue  telemetry  PT/OT/ST  Frequent neuro checks. Continue to monitor and notify neurology with any changes.   -S/p Meclizine 50 mg x 1. Currently on Meclizine 25 mg Q8H PRN dizziness   -Medical management and supportive care per primary team. Correction of any metabolic or infectious disturbances.

## 2025-04-12 NOTE — ASSESSMENT & PLAN NOTE
"Pt presented to the ED secondary to new onset dizziness, N/V, and left lower extremity weakness. Last known normal approx 1am.  Stroke alert called. NIHSS 2. Secondary to improving symptoms while in the ED, pt deemed not to be TNK candidate. Pt loaded with aspirin in the ED.  CT head demonstrating \"No acute vascular territorial infarct, hemorrhage, or focal mass effect/midline shift. Chronic microangiopathic changes.\"  CTA head and neck demonstrating   No evidence for acute large vessel occlusive disease.  Short segment high-grade stenosis at the proximal mid intradural right vertebral artery secondary to calcified and noncalcified atheromatous plaque.  Focal moderate stenosis at the right ICA distal supraclinoid segment.  Moderate stenosis at the bilateral ICA origins secondary to calcified and noncalcified atheromatous plaquing, left worse than right (approximately 50 to 60% on the right and 60 to 70% on the left based on NASCET criteria).  Monitor patient on the stroke pathway for now   MRI brain negative    Continue neuro checks  Monitor on telemetry  ECHO reviewed   Flp, hgba1c reviewed   Appreciate neurology recommendations   Symptoms have resolved.   Despite mri is negative symptoms highly indicative of a stroke per neurology- will await attending eval   "

## 2025-04-12 NOTE — ASSESSMENT & PLAN NOTE
Pt noted to have mild diffuse ST depressions in leads II, III, V4-V6, new compared to EKG on 1/24/2025  Pt with dizziness and N/V noted above, but denies any chest pain, palpitations, diaphoresis, shortness of breath, or back pain  Initial trop 9, continue to trend  Monitor on telemetry  ECHO reviewed   Appreciate cardiology recommendations

## 2025-04-12 NOTE — PLAN OF CARE
Problem: PAIN - ADULT  Goal: Verbalizes/displays adequate comfort level or baseline comfort level  Description: Interventions:- Encourage patient to monitor pain and request assistance- Assess pain using appropriate pain scale- Administer analgesics based on type and severity of pain and evaluate response- Implement non-pharmacological measures as appropriate and evaluate response- Consider cultural and social influences on pain and pain management- Notify physician/advanced practitioner if interventions unsuccessful or patient reports new pain  Outcome: Progressing     Problem: INFECTION - ADULT  Goal: Absence or prevention of progression during hospitalization  Description: INTERVENTIONS:- Assess and monitor for signs and symptoms of infection- Monitor lab/diagnostic results- Monitor all insertion sites, i.e. indwelling lines, tubes, and drains- Monitor endotracheal if appropriate and nasal secretions for changes in amount and color- [place] appropriate cooling/warming therapies per order- Administer medications as ordered- Instruct and encourage patient and family to use good hand hygiene technique- Identify and instruct in appropriate isolation precautions for identified infection/condition  Outcome: Progressing  Goal: Absence of fever/infection during neutropenic period  Description: INTERVENTIONS:- Monitor WBC  Outcome: Progressing     Problem: SAFETY ADULT  Goal: Patient will remain free of falls  Description: INTERVENTIONS:- Educate patient/family on patient safety including physical limitations- Instruct patient to call for assistance with activity - Consult OT/PT to assist with strengthening/mobility - Keep Call bell within reach- Keep bed low and locked with side rails adjusted as appropriate- Keep care items and personal belongings within reach- Initiate and maintain comfort rounds- Make Fall Risk Sign visible to staff- Offer Toileting every  Hours, in advance of need- Initiate/Maintain alarm- Obtain  necessary fall risk management equipment: - Apply yellow socks and bracelet for high fall risk patients- Consider moving patient to room near nurses station  Outcome: Progressing  Goal: Maintain or return to baseline ADL function  Description: INTERVENTIONS:-  Assess patient's ability to carry out ADLs; assess patient's baseline for ADL function and identify physical deficits which impact ability to perform ADLs (bathing, care of mouth/teeth, toileting, grooming, dressing, etc.)- Assess/evaluate cause of self-care deficits - Assess range of motion- Assess patient's mobility; develop plan if impaired- Assess patient's need for assistive devices and provide as appropriate- Encourage maximum independence but intervene and supervise when necessary- Involve family in performance of ADLs- Assess for home care needs following discharge - Consider OT consult to assist with ADL evaluation and planning for discharge- Provide patient education as appropriate  Outcome: Progressing  Goal: Maintains/Returns to pre admission functional level  Description: INTERVENTIONS:- Perform AM-PAC 6 Click Basic Mobility/ Daily Activity assessment daily.- Set and communicate daily mobility goal to care team and patient/family/caregiver. - Collaborate with rehabilitation services on mobility goals if consulted- Perform Range of Motion  times a day.- Reposition patient every  hours.- Dangle patient  times a day- Stand patient  times a day- Ambulate patient  times a day- Out of bed to chair imes a day - Out of bed for meals  times a day- Out of bed for toileting- Record patient progress and toleration of activity level   Outcome: Progressing     Problem: DISCHARGE PLANNING  Goal: Discharge to home or other facility with appropriate resources  Description: INTERVENTIONS:- Identify barriers to discharge w/patient and caregiver- Arrange for needed discharge resources and transportation as appropriate- Identify discharge learning needs (meds, wound  care, etc.)- Arrange for interpretive services to assist at discharge as needed- Refer to Case Management Department for coordinating discharge planning if the patient needs post-hospital services based on physician/advanced practitioner order or complex needs related to functional status, cognitive ability, or social support system  Outcome: Progressing     Problem: Knowledge Deficit  Goal: Patient/family/caregiver demonstrates understanding of disease process, treatment plan, medications, and discharge instructions  Description: Complete learning assessment and assess knowledge base.Interventions:- Provide teaching at level of understanding- Provide teaching via preferred learning methods  Outcome: Progressing

## 2025-04-12 NOTE — ASSESSMENT & PLAN NOTE
"Patient presented to the ED after arising this morning having double vision, dizziness and lower left extremity weakness.  Patient states that the double vision and dizziness are improved but not gone.  The lower extremity weakness has resolved.  Patient deemed not a candidate for TNK  Was loaded with aspirin 325 mg with aspirin 81 mg daily to follow  Was begun on atorvastatin 40 mg daily  CT of the head demonstrating \"No acute vascular territorial infarct, hemorrhage, or focal mass effect/midline shift. Chronic microangiopathic changes.\"   CTA of the head and neck demonstrating  No evidence of acute large vessel occlusive disease  Short segment high-grade stenosis of the proximal mid intradural right vertebral artery secondary to calcified and noncalcified atheromatous plaque  Focal moderate stenosis at the right internal carotid artery distal supraclinoid segment  Moderate stenosis at the bilateral internal carotid artery origins secondary to calcified and noncalcified atheromatous plaque left worse than right (right approximately 50 to 60%, left approximately 60 to 70%.  MRI pending  Telemetry monitor: Sinus tachycardia at a rate of 104 bpm with occasional PVCs with short runs of nonsustained VT.  No atrial fibrillation.  Echo 4/11/2025    Left Ventricle: Left ventricular cavity size is normal. There is mild asymmetric hypertrophy of the basal septal wall. The left ventricular ejection fraction is 65% by visual estimation. Systolic function is normal. Wall motion is normal. Diastolic function is mildly abnormal, consistent with grade I (abnormal) relaxation.    Right Ventricle: Right ventricular cavity size is normal. Systolic function is normal.    Left Atrium: The atrium is mildly dilated.    Aortic Valve: The leaflets are moderately thickened. The leaflets are moderately calcified. There is moderate stenosis with Vmax 3.24 m/s, TESSIE 1.02 cm2, DVI 0.30.    Mitral Valve: There is trace regurgitation.    Tricuspid " Valve: There is mild regurgitation. Pulmonary artery systolic pressures are estimated at 26 mmHg.    Compared to report from February 13, 2025, aortic stenosis appears to be moderate.

## 2025-04-12 NOTE — ASSESSMENT & PLAN NOTE
Sinus tachycardia with frequent premature ventricular contractions and nonspecific ST-T wave abnormalities in leads I, 2, 3, V3 through V6 which were worse than seen on office EKG from January 2025.  These EKG changes are nonspecific in nature and not necessarily ischemic.  Troponins x 3 normal  Patient did not have chest pain  Patient is not short of breath.  On examination there are bibasilar Rales which improved with coughing  Chest x-ray demonstrates no acute cardiopulmonary disease.  There is no evidence for pulmonary edema.  Patient is euvolemic on examination  Sinus tachycardia resolved following IV fluid hydration

## 2025-04-12 NOTE — CONSULTS
Consult received and appreciated for stroke pathway. Patient is tolerating PO intake on a cardiac diet. See nutrition flow sheets for assessment details.

## 2025-04-12 NOTE — DISCHARGE SUMMARY
"Discharge Summary - Hospitalist   Name: Christian Jones 89 y.o. male I MRN: [id number]  Unit/Bed#: E4 -01 I Date of Admission: 4/11/2025   Date of Service: 4/12/2025 I Hospital Day: 0     Assessment & Plan  Stroke-like symptoms  Pt presented to the ED secondary to new onset dizziness, N/V, and left lower extremity weakness. Last known normal approx 1am.  Stroke alert called. NIHSS 2. Secondary to improving symptoms while in the ED, pt deemed not to be TNK candidate. Pt loaded with aspirin in the ED.  CT head demonstrating \"No acute vascular territorial infarct, hemorrhage, or focal mass effect/midline shift. Chronic microangiopathic changes.\"  CTA head and neck demonstrating   No evidence for acute large vessel occlusive disease.  Short segment high-grade stenosis at the proximal mid intradural right vertebral artery secondary to calcified and noncalcified atheromatous plaque.  Focal moderate stenosis at the right ICA distal supraclinoid segment.  Moderate stenosis at the bilateral ICA origins secondary to calcified and noncalcified atheromatous plaquing, left worse than right (approximately 50 to 60% on the right and 60 to 70% on the left based on NASCET criteria).  Monitor patient on the stroke pathway for now   MRI brain negative    Continue neuro checks  Monitor on telemetry  ECHO reviewed   Flp, hgba1c reviewed   Appreciate neurology recommendations   Symptoms have resolved.   Despite mri is negative symptoms highly indicative of a stroke per neurology- will await attending eval   Abnormal EKG  Pt noted to have mild diffuse ST depressions in leads II, III, V4-V6, new compared to EKG on 1/24/2025  Pt with dizziness and N/V noted above, but denies any chest pain, palpitations, diaphoresis, shortness of breath, or back pain  Initial trop 9, continue to trend  Monitor on telemetry  ECHO reviewed   Appreciate cardiology recommendations   Pulmonary edema  Pt noted to have coarse rales with diffuse expiratory " wheezing on exam  CXR demonstrating pulm edema, official read pending     Last ECHO 2/2025 EF 55% grade I diastolic dysfunction  Pt does not otherwise appear volume overloaded on exam  Not maintained on diuretics outpatient   S/p 40mg iv lasix  No further diuresis were recommended and pt was treated with short course of fluids to reduce the risk of dehydration  Appreciate cardiology recommendations   CKD (chronic kidney disease) stage 3, GFR 30-59 ml/min ([facility])  Lab Results   Component Value Date    EGFR 42 04/12/2025    EGFR 43 04/11/2025    EGFR 43 04/09/2025    CREATININE 1.46 (H) 04/12/2025    CREATININE 1.42 (H) 04/11/2025    CREATININE 1.44 (H) 04/09/2025   Approximately at baseline  Baseline appears to be 1.2-1.4  Continue to monitor BMP  Aortic stenosis  Followed outpatient by cardiothoracic surgery, last seen 3/2025, planning for TAVR  Essential hypertension  Hold amlodipine for now to allow permissive HTN  NSVT (nonsustained ventricular tachycardia) (HCC)  Noted on Holter monitoring by cardiology  Initiated on metoprolol 2/23/2025, continue     Medical Problems       Resolved Problems  Date Reviewed: 4/12/2025   None       Discharging Physician / Practitioner: Karen Orantes DO  PCP: Marvin Meadows MD  Admission Date:   Admission Orders (From admission, onward)       Ordered        04/11/25 0448  Place in Observation  Once                          Discharge Date: 04/12/25    Consultations During Hospital Stay:  Cardiology  Neurology     Procedures Performed:   none    Significant Findings / Test Results:   MRI brain wo contrast  Result Date: 4/12/2025  Impression: White matter changes suggestive of chronic microangiopathy. No acute intracranial pathology.     XR chest 1 view portable  Impression: No acute cardiopulmonary disease    CT stroke alert brain  Result Date: 4/11/2025  Impression: No acute vascular territorial infarct, hemorrhage, or focal mass effect/midline shift.  Chronic  microangiopathic changes.     CTA stroke alert (head/neck)  Result Date: 4/11/2025  Impression: No evidence for acute large vessel occlusive disease. Short segment high-grade stenosis at the proximal mid intradural right vertebral artery secondary to calcified and noncalcified atheromatous plaque. Focal moderate stenosis at the right ICA distal supraclinoid segment. Moderate stenosis at the bilateral ICA origins secondary to calcified and noncalcified atheromatous plaquing, left worse than right (approximately 50 to 60% on the right and 60 to 70% on the left based on NASCET criteria).     CTA chest abdomen pelvis w wo contrast TAVR  Result Date: 4/10/2025  Impression: Measurements and analysis to allow for planning of Transcatheter Aortic Valve Repair as above. Extensive atherosclerotic changes with excrescent atheroma, including both calcified and noncalcified components protruding into the lumen of the infrarenal abdominal aorta. Tortuosity of common iliac arteries. Extensive arterial vascular atherosclerotic changes with severe atherosclerotic stenosis at both right and left renal artery origins. Giant duodenal diverticulum. Extensive colonic diverticulosis. No evidence for acute diverticulitis.    Incidental Findings:   none    Test Results Pending at Discharge (will require follow up):   none     Outpatient Tests Requested:  [facility] on Monday 4/14/25    Reason for Admission: stroke like symptoms     Hospital Course:   Christian Jones is a 89 y.o. male patient who originally presented to the hospital on 4/11/2025 due to stroke like symptoms. He had dizziness, n/v, left lower ext weakness. He was a stroke alert but nihss was 2 along with symptoms improving. He was not given TNK due to this. Pt was started on stroke pathway and neurology evaluated him. He was given meclizine and reglan. His symptoms improved. Mri was negative. Neurology stated that even though mri was negative his symptoms were highly concerning for  cva. He will be treated with asa/plavix for 21 days and then changed to asa only. Pt will also continue statin     He had changes on EKG and is scheduled for cardiac cath. There was no concern to change cardiac cath to inpt. He will require bmp on this upcoming Monday prior to cath on Tuesday.     He was discharged to home as his symptoms resolved. He was ambulating well at discharge. He had been recommended to have hhpt but pt declined. He will set up his outpt therapy if needed     Please see above list of diagnoses and related plan for additional information.     Discharge Day Visit / Exam:   * Please refer to separate progress note for these details *    Discharge instructions/Information to patient and family:   See after visit summary for information provided to patient and family.      Provisions for Follow-Up Care:  See after visit summary for information related to follow-up care and any pertinent home health orders.      Planned Readmission: no    Discharge Medications:  See after visit summary for reconciled discharge medications provided to patient and/or family.      Administrative Statements   Discharge Statement:  I have spent a total time of 40 minutes in caring for this patient on the day of the visit/encounter.

## 2025-04-12 NOTE — H&P (VIEW-ONLY)
"Progress Note - Cardiology   Name: Christian Jones 89 y.o. male I MRN: [id number]  Unit/Bed#: E4 -01 I Date of Admission: 4/11/2025   Date of Service: 4/12/2025 I Hospital Day: 0    Assessment & Plan  Stroke-like symptoms  Patient presented to the ED after arising this morning having double vision, dizziness and lower left extremity weakness.  Patient states that the double vision and dizziness are improved but not gone.  The lower extremity weakness has resolved.  Patient deemed not a candidate for TNK  Was loaded with aspirin 325 mg with aspirin 81 mg daily to follow  Was begun on atorvastatin 40 mg daily  CT of the head demonstrating \"No acute vascular territorial infarct, hemorrhage, or focal mass effect/midline shift. Chronic microangiopathic changes.\"   CTA of the head and neck demonstrating  No evidence of acute large vessel occlusive disease  Short segment high-grade stenosis of the proximal mid intradural right vertebral artery secondary to calcified and noncalcified atheromatous plaque  Focal moderate stenosis at the right internal carotid artery distal supraclinoid segment  Moderate stenosis at the bilateral internal carotid artery origins secondary to calcified and noncalcified atheromatous plaque left worse than right (right approximately 50 to 60%, left approximately 60 to 70%.  MRI pending  Telemetry monitor: Sinus tachycardia at a rate of 104 bpm with occasional PVCs with short runs of nonsustained VT.  No atrial fibrillation.  Echo 4/11/2025    Left Ventricle: Left ventricular cavity size is normal. There is mild asymmetric hypertrophy of the basal septal wall. The left ventricular ejection fraction is 65% by visual estimation. Systolic function is normal. Wall motion is normal. Diastolic function is mildly abnormal, consistent with grade I (abnormal) relaxation.    Right Ventricle: Right ventricular cavity size is normal. Systolic function is normal.    Left Atrium: The atrium is mildly " dilated.    Aortic Valve: The leaflets are moderately thickened. The leaflets are moderately calcified. There is moderate stenosis with Vmax 3.24 m/s, TESSIE 1.02 cm2, DVI 0.30.    Mitral Valve: There is trace regurgitation.    Tricuspid Valve: There is mild regurgitation. Pulmonary artery systolic pressures are estimated at 26 mmHg.    Compared to report from February 13, 2025, aortic stenosis appears to be moderate.  Abnormal EKG  Sinus tachycardia with frequent premature ventricular contractions and nonspecific ST-T wave abnormalities in leads I, 2, 3, V3 through V6 which were worse than seen on office EKG from January 2025.  These EKG changes are nonspecific in nature and not necessarily ischemic.  Troponins x 3 normal  Patient did not have chest pain  Patient is not short of breath.  On examination there are bibasilar Rales which improved with coughing  Chest x-ray demonstrates no acute cardiopulmonary disease.  There is no evidence for pulmonary edema.  Patient is euvolemic on examination  Sinus tachycardia resolved following IV fluid hydration  CKD (chronic kidney disease) stage 3, GFR 30-59 ml/min ([facility])  Lab Results   Component Value Date    EGFR 42 04/12/2025    EGFR 43 04/11/2025    EGFR 43 04/09/2025    CREATININE 1.46 (H) 04/12/2025    CREATININE 1.42 (H) 04/11/2025    CREATININE 1.44 (H) 04/09/2025   Baseline creatinine 1.20-1.27  In review of CTA with contrast, will begin normal saline at 75 cc an hour for 12 hrs to reduce the risk of contrast-induced nephropathy  Aortic stenosis  Followed by outpatient cardiovascular surgery and is on a path for TAVR.  Patient to have a cardiac catheterization on 4/15 which probably should be delayed particularly since patient received IV contrast for CTA of the head and neck.  Will ask neurology for their opinion regarding cardiac catheterization and follow his renal function up to discharge.  Essential hypertension  Blood pressure 114  to 142 systolic today   Would not  resume amlodipine at this time  NSVT (nonsustained ventricular tachycardia) (HCC)  Noted on previous Holter monitor and noted on present telemetry  Initiated on metoprolol succinate 25 mg daily on 2/23/2025 and will continue    Interval history:  Patient ready for discharge.  Discussed patient with neurology regarding cardiac catheterization on 4/15/2025, Tuesday.  Neurology stated that his stroke was very small and we could go ahead with the catheterization on Tuesday.  Discussed patient's slight deterioration in renal function with Dr. Marlene Carney and she requested that patient have a BMP drawn on Monday and then they plan to go ahead with the cardiac catheterization on Tuesday.  Patient informed of the plan    Plan:  Discharged today  Nonfasting BMP on Monday  Cardiac catheterization on Tuesday  Discharge medications: Aspirin 81 mg daily, Plavix 75 mg daily, atorvastatin 40 mg daily, metoprolol succinate 25 mg daily    PROBLEM LIST:    Patient Active Problem List    Diagnosis Date Noted    Essential hypertension 04/11/2025    Stroke-like symptoms 04/11/2025    Abnormal EKG 04/11/2025    NSVT (nonsustained ventricular tachycardia) (HCC) 04/11/2025    Pulmonary edema 04/11/2025    Aortic stenosis 01/20/2025    Dyspnea on exertion 01/20/2025    Peripheral vascular disease (HCC) 09/13/2024    Depression, recurrent (HCC) 02/13/2023    CKD (chronic kidney disease) stage 3, GFR 30-59 ml/min (HCC) 02/07/2022    Rectal bleeding 03/20/2019       PREVIOUS WEIGHTS:   Weight (last 2 days)       Date/Time Weight    04/12/25 0600 70.5 (155.42)    04/11/25 0835 70.3 (154.98)    04/11/25 0323 70.3 (154.98)            Wt Readings from Last 2 Encounters:   04/12/25 70.5 kg (155 lb 6.8 oz)   03/21/25 71.1 kg (156 lb 12.8 oz)         Intake/Output Summary (Last 24 hours) at 4/12/2025 1409  Last data filed at 4/12/2025 1318  Gross per 24 hour   Intake 320 ml   Output 800 ml   Net -480 ml       Labs/Data:        Results from last 7  days   Lab Units 04/12/25 0718 04/11/25 0622 04/11/25 0331   WBC Thousand/uL 10.91* 11.93* 10.85*   HEMOGLOBIN g/dL 13.9 12.8 13.6   HEMATOCRIT % 41.6 39.1 41.5   MCV fL 88 90 88   MCH pg 29.3 29.5 28.8   MCHC g/dL 33.4 32.7 32.8   PLATELETS Thousands/uL 196 178 199     Results from last 7 days   Lab Units 04/12/25 0718 04/11/25 0331 04/09/25  1331   EGFR ml/min/1.73sq m 42 43 43   SODIUM mmol/L 140 139 138   POTASSIUM mmol/L 3.8 3.6 4.5   CHLORIDE mmol/L 109* 108 105   CO2 mmol/L 24 19* 24   BUN mg/dL 25 31* 26*   CREATININE mg/dL 1.46* 1.42* 1.44*     Results from last 7 days   Lab Units 04/12/25  0718 04/11/25  0622 04/11/25 0331 04/09/25  1331   CALCIUM mg/dL 8.9  --  8.9 8.9   AST U/L  --   --   --  14   ALT U/L  --   --   --  17   ALK PHOS U/L  --   --   --  69   MAGNESIUM mg/dL  --  2.2  --   --      Lab Results   Component Value Date    CHOLESTEROL 169 04/11/2025    TRIG 100 04/11/2025    HDL 45 04/11/2025    LDLCALC 104 (H) 04/11/2025    HGBA1C 6.0 (H) 04/11/2025     Results from last 7 days   Lab Units 04/11/25 0331   INR  1.06   PROTIME seconds 14.0          Current Facility-Administered Medications:     acetaminophen (TYLENOL) tablet 650 mg, 650 mg, Oral, Q6H PRN, Christos Hays PA-C    [Held by provider] amLODIPine (NORVASC) tablet 10 mg, 10 mg, Oral, Daily, Christos Hays PA-C    aspirin chewable tablet 81 mg, 81 mg, Oral, Daily, Christos Hays PA-C, 81 mg at 04/12/25 0933    atorvastatin (LIPITOR) tablet 40 mg, 40 mg, Oral, QPM, Christos Hays PA-C, 40 mg at 04/11/25 1750    [START ON 4/13/2025] clopidogrel (PLAVIX) tablet 75 mg, 75 mg, Oral, Daily, Sonali Siddiqui PA-C    heparin (porcine) subcutaneous injection 5,000 Units, 5,000 Units, Subcutaneous, Q8H ELSY, Christos Hays PA-C, 5,000 Units at 04/12/25 1313    ipratropium-albuterol (DUO-NEB) 0.5-2.5 mg/3 mL inhalation solution 3 mL, 3 mL, Nebulization, Q6H PRN, Christos Hays PA-C    meclizine (ANTIVERT) tablet 25 mg, 25  "mg, Oral, Q8H PRN, Christos Hays PA-C    metoclopramide (REGLAN) injection 10 mg, 10 mg, Intravenous, Q6H PRN, Christos Hays PA-C, 10 mg at 04/11/25 1637    metoprolol succinate (TOPROL-XL) 24 hr tablet 25 mg, 25 mg, Oral, Daily, Christos Hays PA-C, 25 mg at 04/12/25 0933    ondansetron (ZOFRAN) injection 4 mg, 4 mg, Intravenous, Q6H PRN, Christos Hays PA-C, 4 mg at 04/11/25 2239    pantoprazole (PROTONIX) EC tablet 40 mg, 40 mg, Oral, BID MAGDIEL, Karen Orantes DO    tamsulosin (FLOMAX) capsule 0.4 mg, 0.4 mg, Oral, Daily With Dinner, Christos Hays PA-C, 0.4 mg at 04/11/25 1637  No Known Allergies    Invasive Devices       Peripheral Intravenous Line  Duration             Peripheral IV 04/11/25 Distal;Right;Upper;Ventral (anterior) Arm 1 day                    Review of Systems   Constitutional:  Negative for activity change.   Respiratory:  Negative for cough, chest tightness, shortness of breath and wheezing.    Cardiovascular:  Negative for chest pain, palpitations and leg swelling.   Musculoskeletal:  Negative for gait problem.   Skin:  Negative for color change.   Neurological:  Negative for dizziness, tremors, syncope, weakness, light-headedness and headaches.   Psychiatric/Behavioral:  Negative for agitation and confusion.        Vitals: /70 (BP Location: Right arm)   Pulse 68   Temp 97.5 °F (36.4 °C) (Temporal)   Resp 18   Ht 5' 4\" (1.626 m)   Wt 70.5 kg (155 lb 6.8 oz)   SpO2 97%   BMI 26.68 kg/m²     Physical Exam  Vitals reviewed.   Constitutional:       General: He is not in acute distress.     Appearance: He is well-developed.   HENT:      Head: Normocephalic and atraumatic.   Neck:      Thyroid: No thyromegaly.      Vascular: No carotid bruit or JVD.      Trachea: No tracheal deviation.   Cardiovascular:      Rate and Rhythm: Normal rate and regular rhythm.      Pulses: Normal pulses.      Heart sounds: Murmur heard.      Crescendo decrescendo systolic murmur is present " "with a grade of 2/6.      No friction rub. No gallop.   Pulmonary:      Effort: Pulmonary effort is normal. No respiratory distress.      Breath sounds: Normal breath sounds. No wheezing, rhonchi or rales.   Chest:      Chest wall: No tenderness.   Musculoskeletal:      Cervical back: Normal range of motion and neck supple.      Right lower leg: No edema.      Left lower leg: No edema.   Skin:     General: Skin is warm and dry.   Neurological:      General: No focal deficit present.      Mental Status: He is alert and oriented to person, place, and time.   Psychiatric:         Mood and Affect: Mood normal.         Behavior: Behavior normal.         Thought Content: Thought content normal.         Judgment: Judgment normal.             Portions of the record may have been created with voice recognition software. Occasional wrong word or \"sound a like\" substitutions may have occurred due to the inherent limitations of voice recognition software. Read the chart carefully and recognize, using context, where substitutions have occurred.    ======================================================      Imaging Results Review: No pertinent imaging studies reviewed.  Other Study Results Review: No additional pertinent studies reviewed.    "

## 2025-04-12 NOTE — ASSESSMENT & PLAN NOTE
Pt noted to have coarse rales with diffuse expiratory wheezing on exam  CXR demonstrating pulm edema, official read pending     Last ECHO 2/2025 EF 55% grade I diastolic dysfunction  Pt does not otherwise appear volume overloaded on exam  Not maintained on diuretics outpatient   S/p 40mg iv lasix  No further diuresis were recommended and pt was treated with short course of fluids to reduce the risk of dehydration  Appreciate cardiology recommendations

## 2025-04-14 ENCOUNTER — TELEPHONE (OUTPATIENT)
Age: 89
End: 2025-04-14

## 2025-04-14 ENCOUNTER — APPOINTMENT (OUTPATIENT)
Dept: LAB | Facility: CLINIC | Age: 89
End: 2025-04-14
Payer: COMMERCIAL

## 2025-04-14 DIAGNOSIS — N18.32 STAGE 3B CHRONIC KIDNEY DISEASE (HCC): ICD-10-CM

## 2025-04-14 LAB
ANION GAP SERPL CALCULATED.3IONS-SCNC: 12 MMOL/L (ref 4–13)
BUN SERPL-MCNC: 36 MG/DL (ref 5–25)
CALCIUM SERPL-MCNC: 8.7 MG/DL (ref 8.4–10.2)
CHLORIDE SERPL-SCNC: 108 MMOL/L (ref 96–108)
CO2 SERPL-SCNC: 20 MMOL/L (ref 21–32)
CREAT SERPL-MCNC: 1.62 MG/DL (ref 0.6–1.3)
GFR SERPL CREATININE-BSD FRML MDRD: 37 ML/MIN/1.73SQ M
GLUCOSE P FAST SERPL-MCNC: 95 MG/DL (ref 65–99)
POTASSIUM SERPL-SCNC: 4.1 MMOL/L (ref 3.5–5.3)
SODIUM SERPL-SCNC: 140 MMOL/L (ref 135–147)

## 2025-04-14 PROCEDURE — 36415 COLL VENOUS BLD VENIPUNCTURE: CPT

## 2025-04-14 PROCEDURE — 80048 BASIC METABOLIC PNL TOTAL CA: CPT

## 2025-04-14 NOTE — TELEPHONE ENCOUNTER
Hello Good Day,     Patient is now scheduled with , U JAZMYNE PACHECO, 8AM, 9/8/2025.      PLACED ON WAITING LIST.     Thank you all,     Latha        U/ JENNIFER ALL/ Stroke-like symptoms     DC- HOME- 4/12/2025    ----- Message from Sonali Siddiqui PA-C sent at 4/12/2025 12:09 PM EDT -----  Christian Jones will need follow-up in in 6 weeks with neurovascular team for Other= ATTENDING  in 60 minute appointment. They will not require outpatient neurological testing.

## 2025-04-15 ENCOUNTER — HOSPITAL ENCOUNTER (OUTPATIENT)
Facility: HOSPITAL | Age: 89
Setting detail: OUTPATIENT SURGERY
Discharge: HOME | End: 2025-04-15
Attending: INTERNAL MEDICINE | Admitting: INTERNAL MEDICINE
Payer: COMMERCIAL

## 2025-04-15 VITALS
HEART RATE: 63 BPM | DIASTOLIC BLOOD PRESSURE: 69 MMHG | WEIGHT: 150 LBS | BODY MASS INDEX: 25.61 KG/M2 | HEIGHT: 64 IN | OXYGEN SATURATION: 99 % | TEMPERATURE: 97.7 F | RESPIRATION RATE: 18 BRPM | SYSTOLIC BLOOD PRESSURE: 131 MMHG

## 2025-04-15 DIAGNOSIS — I25.10 CORONARY ARTERY DISEASE INVOLVING NATIVE CORONARY ARTERY: Primary | ICD-10-CM

## 2025-04-15 DIAGNOSIS — R06.09 DYSPNEA ON EXERTION: ICD-10-CM

## 2025-04-15 DIAGNOSIS — N18.31 STAGE 3A CHRONIC KIDNEY DISEASE (HCC): ICD-10-CM

## 2025-04-15 DIAGNOSIS — I47.29 NSVT (NONSUSTAINED VENTRICULAR TACHYCARDIA) (HCC): ICD-10-CM

## 2025-04-15 DIAGNOSIS — I35.0 MODERATE AORTIC STENOSIS: ICD-10-CM

## 2025-04-15 LAB
ANION GAP SERPL CALCULATED.3IONS-SCNC: 9 MMOL/L (ref 4–13)
ATRIAL RATE: 77 BPM
BUN SERPL-MCNC: 37 MG/DL (ref 5–25)
CALCIUM SERPL-MCNC: 8.4 MG/DL (ref 8.4–10.2)
CHLORIDE SERPL-SCNC: 111 MMOL/L (ref 96–108)
CO2 SERPL-SCNC: 21 MMOL/L (ref 21–32)
CREAT SERPL-MCNC: 1.51 MG/DL (ref 0.6–1.3)
GFR SERPL CREATININE-BSD FRML MDRD: 40 ML/MIN/1.73SQ M
GLUCOSE P FAST SERPL-MCNC: 96 MG/DL (ref 65–99)
GLUCOSE SERPL-MCNC: 96 MG/DL (ref 65–140)
P AXIS: 50 DEGREES
POTASSIUM SERPL-SCNC: 4 MMOL/L (ref 3.5–5.3)
PR INTERVAL: 184 MS
QRS AXIS: 58 DEGREES
QRSD INTERVAL: 82 MS
QT INTERVAL: 412 MS
QTC INTERVAL: 467 MS
SODIUM SERPL-SCNC: 141 MMOL/L (ref 135–147)
T WAVE AXIS: -23 DEGREES
VENTRICULAR RATE: 77 BPM

## 2025-04-15 PROCEDURE — 93454 CORONARY ARTERY ANGIO S&I: CPT | Performed by: INTERNAL MEDICINE

## 2025-04-15 PROCEDURE — 93010 ELECTROCARDIOGRAM REPORT: CPT | Performed by: INTERNAL MEDICINE

## 2025-04-15 PROCEDURE — 99152 MOD SED SAME PHYS/QHP 5/>YRS: CPT | Performed by: INTERNAL MEDICINE

## 2025-04-15 PROCEDURE — C1894 INTRO/SHEATH, NON-LASER: HCPCS | Performed by: INTERNAL MEDICINE

## 2025-04-15 PROCEDURE — 99153 MOD SED SAME PHYS/QHP EA: CPT | Performed by: INTERNAL MEDICINE

## 2025-04-15 PROCEDURE — 80048 BASIC METABOLIC PNL TOTAL CA: CPT

## 2025-04-15 PROCEDURE — 93005 ELECTROCARDIOGRAM TRACING: CPT

## 2025-04-15 PROCEDURE — C1769 GUIDE WIRE: HCPCS | Performed by: INTERNAL MEDICINE

## 2025-04-15 RX ORDER — SODIUM CHLORIDE 9 MG/ML
50 INJECTION, SOLUTION INTRAVENOUS CONTINUOUS
Status: DISPENSED | OUTPATIENT
Start: 2025-04-15 | End: 2025-04-15

## 2025-04-15 RX ORDER — NITROGLYCERIN 20 MG/100ML
INJECTION INTRAVENOUS CODE/TRAUMA/SEDATION MEDICATION
Status: DISCONTINUED | OUTPATIENT
Start: 2025-04-15 | End: 2025-04-15 | Stop reason: HOSPADM

## 2025-04-15 RX ORDER — HEPARIN SODIUM 1000 [USP'U]/ML
INJECTION, SOLUTION INTRAVENOUS; SUBCUTANEOUS CODE/TRAUMA/SEDATION MEDICATION
Status: DISCONTINUED | OUTPATIENT
Start: 2025-04-15 | End: 2025-04-15 | Stop reason: HOSPADM

## 2025-04-15 RX ORDER — MIDAZOLAM HYDROCHLORIDE 2 MG/2ML
INJECTION, SOLUTION INTRAMUSCULAR; INTRAVENOUS CODE/TRAUMA/SEDATION MEDICATION
Status: DISCONTINUED | OUTPATIENT
Start: 2025-04-15 | End: 2025-04-15 | Stop reason: HOSPADM

## 2025-04-15 RX ORDER — ATORVASTATIN CALCIUM 80 MG/1
80 TABLET, FILM COATED ORAL DAILY
Qty: 90 TABLET | Refills: 3 | Status: SHIPPED | OUTPATIENT
Start: 2025-04-15

## 2025-04-15 RX ORDER — VERAPAMIL HYDROCHLORIDE 2.5 MG/ML
INJECTION INTRAVENOUS CODE/TRAUMA/SEDATION MEDICATION
Status: DISCONTINUED | OUTPATIENT
Start: 2025-04-15 | End: 2025-04-15 | Stop reason: HOSPADM

## 2025-04-15 RX ORDER — ASPIRIN 81 MG/1
324 TABLET, CHEWABLE ORAL ONCE
Status: COMPLETED | OUTPATIENT
Start: 2025-04-15 | End: 2025-04-15

## 2025-04-15 RX ORDER — ONDANSETRON 2 MG/ML
4 INJECTION INTRAMUSCULAR; INTRAVENOUS ONCE AS NEEDED
Status: DISCONTINUED | OUTPATIENT
Start: 2025-04-15 | End: 2025-04-15 | Stop reason: HOSPADM

## 2025-04-15 RX ORDER — SODIUM CHLORIDE 9 MG/ML
50 INJECTION, SOLUTION INTRAVENOUS CONTINUOUS
Status: DISCONTINUED | OUTPATIENT
Start: 2025-04-15 | End: 2025-04-15

## 2025-04-15 RX ORDER — FENTANYL CITRATE 50 UG/ML
INJECTION, SOLUTION INTRAMUSCULAR; INTRAVENOUS CODE/TRAUMA/SEDATION MEDICATION
Status: DISCONTINUED | OUTPATIENT
Start: 2025-04-15 | End: 2025-04-15 | Stop reason: HOSPADM

## 2025-04-15 RX ORDER — LIDOCAINE HYDROCHLORIDE 10 MG/ML
INJECTION, SOLUTION EPIDURAL; INFILTRATION; INTRACAUDAL; PERINEURAL CODE/TRAUMA/SEDATION MEDICATION
Status: DISCONTINUED | OUTPATIENT
Start: 2025-04-15 | End: 2025-04-15 | Stop reason: HOSPADM

## 2025-04-15 RX ORDER — ACETAMINOPHEN 325 MG/1
975 TABLET ORAL ONCE AS NEEDED
Status: DISCONTINUED | OUTPATIENT
Start: 2025-04-15 | End: 2025-04-15 | Stop reason: HOSPADM

## 2025-04-15 RX ADMIN — SODIUM CHLORIDE 211.5 ML: 0.9 INJECTION, SOLUTION INTRAVENOUS at 07:40

## 2025-04-15 RX ADMIN — ASPIRIN 81 MG CHEWABLE TABLET 324 MG: 81 TABLET CHEWABLE at 07:38

## 2025-04-15 NOTE — INTERVAL H&P NOTE
"Update: (This section must be completed if the H&P was completed greater than 24 hrs to procedure or admission)    H&P reviewed. After examining the patient, I find no changed to the H&P since it had been written.    Christian Jones,an 89 year old male, presents to [facility] for outpatient elective cardiac catheterization as part of pre TAVR evaluation. Follows with Dr Caballero.    Nephrology is consulted for optimization of kidney function during admission.      /67 (BP Location: Right arm)   Pulse 70   Temp 98.3 °F (36.8 °C) (Oral)   Resp 16   Ht 5' 4\" (1.626 m)   Wt 68 kg (150 lb)   SpO2 96%   BMI 25.75 kg/m²       Patient re-evaluated. Accept as history and physical.    KAI Reyes/April 15, 2025/7:58 AM  "

## 2025-04-15 NOTE — DISCHARGE INSTR - AVS FIRST PAGE
1. Please see the post cardiac catheterization dishcarge instructions.   No heavy lifting, greater than 10 lbs. or strenuous  activity for 48 hrs.    2.Remove band aid tomorrow.  Shower and wash area- wrist gently with soap and water- beginning tomorrow. Rinse and pat dry.  Apply new water seal band aid.  Repeat this process for 5 days. No powders, creams lotions or antibiotic ointments  for 5 days.  No tub baths, hot tubs or swimming for 5 days.     3. Please call our office ([phone]) if you have any fever, redness, swelling, discharge from your wrist access site.    4.No driving for 1 day

## 2025-04-16 ENCOUNTER — TELEPHONE (OUTPATIENT)
Dept: CARDIOLOGY CLINIC | Facility: CLINIC | Age: 89
End: 2025-04-16

## 2025-04-16 NOTE — TELEPHONE ENCOUNTER
Patient scheduled for PCI on 4/25/25  at [facility] with Dr. DOAN.      Instructions sent to patient through [employer].      Patient aware of all general instructions.    Medication holds:     NO MEDICATION HOLDS     Labs to be done on BMP DONE ON 4/15/25, CBC DONE ON 4/12/25   CMP / CBC (fasting 8 hours)       Dr. Doan, can you please place prep for procedure.   Thank you,   Lizette

## 2025-04-18 ENCOUNTER — OFFICE VISIT (OUTPATIENT)
Dept: INTERNAL MEDICINE CLINIC | Facility: CLINIC | Age: 89
End: 2025-04-18
Payer: COMMERCIAL

## 2025-04-18 ENCOUNTER — PREP FOR PROCEDURE (OUTPATIENT)
Dept: CARDIOLOGY CLINIC | Facility: MEDICAL CENTER | Age: 89
End: 2025-04-18

## 2025-04-18 ENCOUNTER — APPOINTMENT (OUTPATIENT)
Dept: LAB | Facility: CLINIC | Age: 89
End: 2025-04-18
Payer: COMMERCIAL

## 2025-04-18 VITALS
WEIGHT: 154 LBS | BODY MASS INDEX: 26.29 KG/M2 | DIASTOLIC BLOOD PRESSURE: 86 MMHG | TEMPERATURE: 98.9 F | SYSTOLIC BLOOD PRESSURE: 136 MMHG | HEART RATE: 68 BPM | OXYGEN SATURATION: 98 % | HEIGHT: 64 IN

## 2025-04-18 DIAGNOSIS — N18.31 STAGE 3A CHRONIC KIDNEY DISEASE (HCC): ICD-10-CM

## 2025-04-18 DIAGNOSIS — G45.9 TIA (TRANSIENT ISCHEMIC ATTACK): Primary | ICD-10-CM

## 2025-04-18 DIAGNOSIS — E78.2 MIXED HYPERLIPIDEMIA: ICD-10-CM

## 2025-04-18 DIAGNOSIS — I25.10 CORONARY ARTERY DISEASE DUE TO LIPID RICH PLAQUE: ICD-10-CM

## 2025-04-18 DIAGNOSIS — N18.9 CHRONIC KIDNEY DISEASE, UNSPECIFIED CKD STAGE: ICD-10-CM

## 2025-04-18 DIAGNOSIS — I25.10 CORONARY ARTERY DISEASE INVOLVING NATIVE CORONARY ARTERY, UNSPECIFIED WHETHER ANGINA PRESENT, UNSPECIFIED WHETHER NATIVE OR TRANSPLANTED HEART: Primary | ICD-10-CM

## 2025-04-18 DIAGNOSIS — I25.83 CORONARY ARTERY DISEASE DUE TO LIPID RICH PLAQUE: ICD-10-CM

## 2025-04-18 DIAGNOSIS — I10 PRIMARY HYPERTENSION: ICD-10-CM

## 2025-04-18 DIAGNOSIS — I35.0 NONRHEUMATIC AORTIC VALVE STENOSIS: ICD-10-CM

## 2025-04-18 LAB
ANION GAP SERPL CALCULATED.3IONS-SCNC: 9 MMOL/L (ref 4–13)
BUN SERPL-MCNC: 25 MG/DL (ref 5–25)
CALCIUM SERPL-MCNC: 8.6 MG/DL (ref 8.4–10.2)
CHLORIDE SERPL-SCNC: 111 MMOL/L (ref 96–108)
CO2 SERPL-SCNC: 20 MMOL/L (ref 21–32)
CREAT SERPL-MCNC: 1.34 MG/DL (ref 0.6–1.3)
GFR SERPL CREATININE-BSD FRML MDRD: 46 ML/MIN/1.73SQ M
GLUCOSE P FAST SERPL-MCNC: 114 MG/DL (ref 65–99)
POTASSIUM SERPL-SCNC: 4.1 MMOL/L (ref 3.5–5.3)
SODIUM SERPL-SCNC: 140 MMOL/L (ref 135–147)

## 2025-04-18 PROCEDURE — 36415 COLL VENOUS BLD VENIPUNCTURE: CPT

## 2025-04-18 PROCEDURE — 99496 TRANSJ CARE MGMT HIGH F2F 7D: CPT | Performed by: INTERNAL MEDICINE

## 2025-04-18 PROCEDURE — 80048 BASIC METABOLIC PNL TOTAL CA: CPT

## 2025-04-18 NOTE — PROGRESS NOTES
Transition of Care Visit:  Name: Christian Jones      : 1935      MRN: [id number]  Encounter Provider: Marvin Meadows MD  Encounter Date: 2025   Encounter department: [facility] INTERNAL MEDICINE [facility] [place]    Assessment & Plan  TIA (transient ischemic attack)  No  signs  of  large  vessel  occlusion on the MRI and MRA   Continue aspirin and clopidrogel for 21 days then  aspirin  alone     Aortic  stenosis   Surgery planned for  May    Primary hypertension         Mixed hyperlipidemia         Coronary artery disease due to lipid rich plaque         Nonrheumatic aortic valve stenosis              History of Present Illness     Transitional Care Management Review:   Christian Jones is a 89 y.o. male here for TCM follow up.     During the TCM phone call patient stated:  TCM Call (since 2025)     Date and time call was made  2025  8:53 AM    Hospital care reviewed  Records reviewed    Patient was hospitialized at  [facility]    Date of Admission  25    Date of discharge  25    Diagnosis  stroke-like symptoms    Disposition  Home    Were the patients medications reviewed and updated  No    Current Symptoms  None      TCM Call (since 2025)     Post hospital issues  None    Scheduled for follow up?  No    Did you obtain your prescribed medications  Yes    Do you need help managing your prescriptions or medications  No    Is transportation to your appointment needed  No    I have advised the patient to call PCP with any new or worsening symptoms  Lv Chauhan CMA    Living Arrangements  Spouse or Significiant other    Support System  Spouse    Do you have social support  Yes, as much as I need        HPI  Review of Systems   Constitutional: Negative.    HENT:  Negative for dental problem, drooling, ear discharge and ear pain.    Eyes:  Negative for discharge, redness and itching.   Respiratory:  Negative for apnea, cough and wheezing.   "  Cardiovascular:  Negative for chest pain and palpitations.   Gastrointestinal:  Negative for abdominal pain, blood in stool, diarrhea and vomiting.   Endocrine: Negative for polydipsia, polyphagia and polyuria.   Genitourinary:  Negative for decreased urine volume, dysuria and frequency.   Musculoskeletal:  Negative for arthralgias, myalgias and neck stiffness.   Skin:  Negative for pallor and wound.   Allergic/Immunologic: Negative for environmental allergies and food allergies.   Neurological:  Negative for facial asymmetry, light-headedness, numbness and headaches.   Hematological:  Negative for adenopathy. Does not bruise/bleed easily.   Psychiatric/Behavioral:  Negative for agitation, behavioral problems and confusion.      Objective   /86 (BP Location: Left arm, Patient Position: Sitting, Cuff Size: Standard)   Pulse 68   Temp 98.9 °F (37.2 °C) (Temporal)   Ht 5' 4\" (1.626 m)   Wt 69.9 kg (154 lb)   SpO2 98%   BMI 26.43 kg/m²     Physical Exam  Vitals and nursing note reviewed.   Constitutional:       General: He is not in acute distress.     Appearance: He is well-developed.   HENT:      Head: Normocephalic and atraumatic.   Eyes:      Conjunctiva/sclera: Conjunctivae normal.   Cardiovascular:      Rate and Rhythm: Normal rate and regular rhythm.      Heart sounds: Murmur heard.   Pulmonary:      Effort: Pulmonary effort is normal. No respiratory distress.      Breath sounds: Normal breath sounds.   Abdominal:      Palpations: Abdomen is soft.      Tenderness: There is no abdominal tenderness.   Musculoskeletal:         General: No swelling.      Cervical back: Neck supple.   Skin:     General: Skin is warm and dry.      Capillary Refill: Capillary refill takes less than 2 seconds.   Neurological:      Mental Status: He is alert.   Psychiatric:         Mood and Affect: Mood normal.       Medications have been reviewed by provider in current encounter    Recovering  well  from  hospitalization  " for  TIA  symptoms,  CAD   and Aortic  Valve  stenosis.    Stable on current  meds.  Aortic  valve  surgery  planned  to  be performed on  [place] of  May.    Fup  After  surgery    Marvin Meadows MD.FACP

## 2025-04-23 LAB
DME PARACHUTE DELIVERY DATE ACTUAL: NORMAL
DME PARACHUTE DELIVERY DATE REQUESTED: NORMAL
DME PARACHUTE ITEM DESCRIPTION: NORMAL
DME PARACHUTE ORDER STATUS: NORMAL
DME PARACHUTE SUPPLIER NAME: NORMAL
DME PARACHUTE SUPPLIER PHONE: NORMAL

## 2025-04-25 ENCOUNTER — HOSPITAL ENCOUNTER (OUTPATIENT)
Facility: HOSPITAL | Age: 89
Setting detail: OUTPATIENT SURGERY
Discharge: HOME | End: 2025-04-26
Attending: INTERNAL MEDICINE | Admitting: INTERNAL MEDICINE
Payer: COMMERCIAL

## 2025-04-25 DIAGNOSIS — I25.10 CORONARY ARTERY DISEASE INVOLVING NATIVE CORONARY ARTERY OF NATIVE HEART WITHOUT ANGINA PECTORIS: Primary | ICD-10-CM

## 2025-04-25 DIAGNOSIS — N18.9 CHRONIC KIDNEY DISEASE, UNSPECIFIED CKD STAGE: ICD-10-CM

## 2025-04-25 DIAGNOSIS — I25.10 CORONARY ARTERY DISEASE INVOLVING NATIVE CORONARY ARTERY, UNSPECIFIED WHETHER ANGINA PRESENT, UNSPECIFIED WHETHER NATIVE OR TRANSPLANTED HEART: ICD-10-CM

## 2025-04-25 PROBLEM — N18.31 CKD STAGE 3A, GFR 45-59 ML/MIN (HCC): Status: ACTIVE | Noted: 2025-04-25

## 2025-04-25 PROBLEM — N18.30 BENIGN HYPERTENSION WITH CHRONIC KIDNEY DISEASE, STAGE III (HCC): Status: ACTIVE | Noted: 2025-04-25

## 2025-04-25 PROBLEM — Z91.89 AT RISK FOR ELECTROLYTE IMBALANCE: Status: ACTIVE | Noted: 2025-04-25

## 2025-04-25 PROBLEM — I12.9 BENIGN HYPERTENSION WITH CHRONIC KIDNEY DISEASE, STAGE III (HCC): Status: ACTIVE | Noted: 2025-04-25

## 2025-04-25 PROBLEM — D64.9 ANEMIA: Status: ACTIVE | Noted: 2025-04-25

## 2025-04-25 LAB
ANION GAP SERPL CALCULATED.3IONS-SCNC: 8 MMOL/L (ref 4–13)
ATRIAL RATE: 79 BPM
ATRIAL RATE: 84 BPM
BUN SERPL-MCNC: 23 MG/DL (ref 5–25)
CALCIUM SERPL-MCNC: 8.6 MG/DL (ref 8.4–10.2)
CHLORIDE SERPL-SCNC: 109 MMOL/L (ref 96–108)
CO2 SERPL-SCNC: 23 MMOL/L (ref 21–32)
CREAT SERPL-MCNC: 1.33 MG/DL (ref 0.6–1.3)
GFR SERPL CREATININE-BSD FRML MDRD: 47 ML/MIN/1.73SQ M
GLUCOSE P FAST SERPL-MCNC: 109 MG/DL (ref 65–99)
GLUCOSE SERPL-MCNC: 109 MG/DL (ref 65–140)
KCT BLD-ACNC: 201 SEC (ref 89–137)
KCT BLD-ACNC: 268 SEC (ref 89–137)
KCT BLD-ACNC: 344 SEC (ref 89–137)
KCT BLD-ACNC: 402 SEC (ref 89–137)
P AXIS: 51 DEGREES
P AXIS: 67 DEGREES
POTASSIUM SERPL-SCNC: 3.9 MMOL/L (ref 3.5–5.3)
PR INTERVAL: 182 MS
PR INTERVAL: 202 MS
QRS AXIS: 68 DEGREES
QRS AXIS: 78 DEGREES
QRSD INTERVAL: 70 MS
QRSD INTERVAL: 74 MS
QT INTERVAL: 392 MS
QT INTERVAL: 406 MS
QTC INTERVAL: 464 MS
QTC INTERVAL: 466 MS
SODIUM SERPL-SCNC: 140 MMOL/L (ref 135–147)
SPECIMEN SOURCE: ABNORMAL
T WAVE AXIS: -65 DEGREES
T WAVE AXIS: 6 DEGREES
VENTRICULAR RATE: 79 BPM
VENTRICULAR RATE: 84 BPM

## 2025-04-25 PROCEDURE — C1769 GUIDE WIRE: HCPCS | Performed by: INTERNAL MEDICINE

## 2025-04-25 PROCEDURE — C1894 INTRO/SHEATH, NON-LASER: HCPCS | Performed by: INTERNAL MEDICINE

## 2025-04-25 PROCEDURE — 93010 ELECTROCARDIOGRAM REPORT: CPT | Performed by: INTERNAL MEDICINE

## 2025-04-25 PROCEDURE — 85347 COAGULATION TIME ACTIVATED: CPT

## 2025-04-25 PROCEDURE — C1725 CATH, TRANSLUMIN NON-LASER: HCPCS | Performed by: INTERNAL MEDICINE

## 2025-04-25 PROCEDURE — 92928 PRQ TCAT PLMT NTRAC ST 1 LES: CPT | Performed by: INTERNAL MEDICINE

## 2025-04-25 PROCEDURE — 92978 ENDOLUMINL IVUS OCT C 1ST: CPT | Performed by: INTERNAL MEDICINE

## 2025-04-25 PROCEDURE — 80048 BASIC METABOLIC PNL TOTAL CA: CPT | Performed by: INTERNAL MEDICINE

## 2025-04-25 PROCEDURE — C1874 STENT, COATED/COV W/DEL SYS: HCPCS | Performed by: INTERNAL MEDICINE

## 2025-04-25 PROCEDURE — 99153 MOD SED SAME PHYS/QHP EA: CPT | Performed by: INTERNAL MEDICINE

## 2025-04-25 PROCEDURE — 92920 PRQ TRLUML C ANGIOP 1ART&/BR: CPT | Performed by: INTERNAL MEDICINE

## 2025-04-25 PROCEDURE — 93005 ELECTROCARDIOGRAM TRACING: CPT

## 2025-04-25 PROCEDURE — 99204 OFFICE O/P NEW MOD 45 MIN: CPT | Performed by: INTERNAL MEDICINE

## 2025-04-25 PROCEDURE — C9600 PERC DRUG-EL COR STENT SING: HCPCS | Performed by: INTERNAL MEDICINE

## 2025-04-25 PROCEDURE — 99152 MOD SED SAME PHYS/QHP 5/>YRS: CPT | Performed by: INTERNAL MEDICINE

## 2025-04-25 PROCEDURE — C1753 CATH, INTRAVAS ULTRASOUND: HCPCS | Performed by: INTERNAL MEDICINE

## 2025-04-25 PROCEDURE — C1887 CATHETER, GUIDING: HCPCS | Performed by: INTERNAL MEDICINE

## 2025-04-25 DEVICE — EVEROLIMUS-ELUTING PLATINUM CHROMIUM CORONARY STENT SYSTEM
Type: IMPLANTABLE DEVICE | Status: FUNCTIONAL
Brand: SYNERGY™ XD

## 2025-04-25 RX ORDER — HEPARIN SODIUM 1000 [USP'U]/ML
INJECTION, SOLUTION INTRAVENOUS; SUBCUTANEOUS CODE/TRAUMA/SEDATION MEDICATION
Status: DISCONTINUED | OUTPATIENT
Start: 2025-04-25 | End: 2025-04-25 | Stop reason: HOSPADM

## 2025-04-25 RX ORDER — MECLIZINE HYDROCHLORIDE 25 MG/1
25 TABLET ORAL EVERY 8 HOURS PRN
Status: DISCONTINUED | OUTPATIENT
Start: 2025-04-25 | End: 2025-04-26 | Stop reason: HOSPADM

## 2025-04-25 RX ORDER — SODIUM CHLORIDE 9 MG/ML
75 INJECTION, SOLUTION INTRAVENOUS CONTINUOUS
Status: DISCONTINUED | OUTPATIENT
Start: 2025-04-25 | End: 2025-04-26

## 2025-04-25 RX ORDER — LIDOCAINE HYDROCHLORIDE 10 MG/ML
INJECTION, SOLUTION EPIDURAL; INFILTRATION; INTRACAUDAL; PERINEURAL CODE/TRAUMA/SEDATION MEDICATION
Status: DISCONTINUED | OUTPATIENT
Start: 2025-04-25 | End: 2025-04-25 | Stop reason: HOSPADM

## 2025-04-25 RX ORDER — PANTOPRAZOLE SODIUM 40 MG/1
40 TABLET, DELAYED RELEASE ORAL
Status: DISCONTINUED | OUTPATIENT
Start: 2025-04-26 | End: 2025-04-26 | Stop reason: HOSPADM

## 2025-04-25 RX ORDER — METOPROLOL SUCCINATE 25 MG/1
25 TABLET, EXTENDED RELEASE ORAL DAILY
Status: DISCONTINUED | OUTPATIENT
Start: 2025-04-26 | End: 2025-04-26 | Stop reason: HOSPADM

## 2025-04-25 RX ORDER — ATORVASTATIN CALCIUM 80 MG/1
80 TABLET, FILM COATED ORAL
Status: DISCONTINUED | OUTPATIENT
Start: 2025-04-25 | End: 2025-04-26 | Stop reason: HOSPADM

## 2025-04-25 RX ORDER — VERAPAMIL HYDROCHLORIDE 2.5 MG/ML
INJECTION INTRAVENOUS CODE/TRAUMA/SEDATION MEDICATION
Status: DISCONTINUED | OUTPATIENT
Start: 2025-04-25 | End: 2025-04-25 | Stop reason: HOSPADM

## 2025-04-25 RX ORDER — MIDAZOLAM HYDROCHLORIDE 2 MG/2ML
INJECTION, SOLUTION INTRAMUSCULAR; INTRAVENOUS CODE/TRAUMA/SEDATION MEDICATION
Status: DISCONTINUED | OUTPATIENT
Start: 2025-04-25 | End: 2025-04-25 | Stop reason: HOSPADM

## 2025-04-25 RX ORDER — FENTANYL CITRATE 50 UG/ML
INJECTION, SOLUTION INTRAMUSCULAR; INTRAVENOUS CODE/TRAUMA/SEDATION MEDICATION
Status: DISCONTINUED | OUTPATIENT
Start: 2025-04-25 | End: 2025-04-25 | Stop reason: HOSPADM

## 2025-04-25 RX ORDER — ACETAMINOPHEN 325 MG/1
650 TABLET ORAL EVERY 4 HOURS PRN
Status: DISCONTINUED | OUTPATIENT
Start: 2025-04-25 | End: 2025-04-26 | Stop reason: HOSPADM

## 2025-04-25 RX ORDER — CLOPIDOGREL BISULFATE 75 MG/1
TABLET ORAL CODE/TRAUMA/SEDATION MEDICATION
Status: DISCONTINUED | OUTPATIENT
Start: 2025-04-25 | End: 2025-04-25 | Stop reason: HOSPADM

## 2025-04-25 RX ORDER — CLOPIDOGREL BISULFATE 75 MG/1
75 TABLET ORAL DAILY
Status: DISCONTINUED | OUTPATIENT
Start: 2025-04-26 | End: 2025-04-26 | Stop reason: HOSPADM

## 2025-04-25 RX ORDER — NITROGLYCERIN 20 MG/100ML
INJECTION INTRAVENOUS CODE/TRAUMA/SEDATION MEDICATION
Status: DISCONTINUED | OUTPATIENT
Start: 2025-04-25 | End: 2025-04-25 | Stop reason: HOSPADM

## 2025-04-25 RX ORDER — SODIUM CHLORIDE 9 MG/ML
50 INJECTION, SOLUTION INTRAVENOUS CONTINUOUS
Status: DISPENSED | OUTPATIENT
Start: 2025-04-25 | End: 2025-04-25

## 2025-04-25 RX ORDER — ASPIRIN 81 MG/1
324 TABLET, CHEWABLE ORAL ONCE
Status: COMPLETED | OUTPATIENT
Start: 2025-04-25 | End: 2025-04-25

## 2025-04-25 RX ORDER — ALFUZOSIN HYDROCHLORIDE 10 MG/1
10 TABLET, EXTENDED RELEASE ORAL DAILY
Status: DISCONTINUED | OUTPATIENT
Start: 2025-04-26 | End: 2025-04-26 | Stop reason: HOSPADM

## 2025-04-25 RX ORDER — AMLODIPINE BESYLATE 10 MG/1
10 TABLET ORAL DAILY
Status: DISCONTINUED | OUTPATIENT
Start: 2025-04-26 | End: 2025-04-26 | Stop reason: HOSPADM

## 2025-04-25 RX ORDER — ONDANSETRON 2 MG/ML
4 INJECTION INTRAMUSCULAR; INTRAVENOUS EVERY 6 HOURS PRN
Status: DISCONTINUED | OUTPATIENT
Start: 2025-04-25 | End: 2025-04-26 | Stop reason: HOSPADM

## 2025-04-25 RX ORDER — ASPIRIN 81 MG/1
81 TABLET, CHEWABLE ORAL DAILY
Status: DISCONTINUED | OUTPATIENT
Start: 2025-04-26 | End: 2025-04-26 | Stop reason: HOSPADM

## 2025-04-25 RX ADMIN — SODIUM CHLORIDE 75 ML/HR: 0.9 INJECTION, SOLUTION INTRAVENOUS at 14:51

## 2025-04-25 RX ADMIN — ATORVASTATIN CALCIUM 80 MG: 80 TABLET, FILM COATED ORAL at 17:12

## 2025-04-25 RX ADMIN — SODIUM CHLORIDE 209.7 ML: 0.9 INJECTION, SOLUTION INTRAVENOUS at 09:00

## 2025-04-25 RX ADMIN — ASPIRIN 81 MG CHEWABLE TABLET 243 MG: 81 TABLET CHEWABLE at 08:58

## 2025-04-25 NOTE — INTERVAL H&P NOTE
Patient seen and examined at bedside.    Vitals:    04/25/25 0850   BP: 127/69   Pulse: 84   Resp: 16   Temp: 98.3 °F (36.8 °C)   SpO2: 97%       Lab Results   Component Value Date    WBC 10.91 (H) 04/12/2025    HGB 13.9 04/12/2025    HCT 41.6 04/12/2025    MCV 88 04/12/2025     04/12/2025       Lab Results   Component Value Date    SODIUM 140 04/25/2025    K 3.9 04/25/2025     (H) 04/25/2025    CO2 23 04/25/2025    BUN 23 04/25/2025    CREATININE 1.33 (H) 04/25/2025    GLUC 109 04/25/2025    CALCIUM 8.6 04/25/2025         Brief overview of procedure discussed with patient.  Indication, risk, benefits and alternatives discussed with patient who verbalized understanding.  All questions addressed fully.  Patient elected to proceed with planned procedure, consent completed.    Patient remains clinically stable.  Renal function abnormal, hydration and contrast saving measures enforced. Coagulation profile, and hemoglobin all within normal limits.    We will proceed with planned procedure.    Wellington Schneider 04/25/25 11:28 AM

## 2025-04-25 NOTE — CONSULTS
"NEPHROLOGY HOSPITAL CONSULTATION   Christian Jones 89 y.o. male MRN: [id number]  Unit/Bed#: [facility] CATH LAB ROOM Encounter: [id number]    Brief History of Admission - 89 year old male brought to [facility] for elective cardiac catheterization as part of pre-TAVR evaluation. Nephrology consulted for CKD/Risk Reduction Recommendations.    Assessment & Plan  CKD stage 3a, GFR 45-59 ml/min ([facility])  Etiology: hypertensive nephrosclerosis + age related nephron loss  Per [facility] records, Care Everywhere, and patient, does not appear to follow outpatient with nephrology  Baseline creatinine: 1.2-1.5 since 2023  Current creatinine 1.33-in baseline  Home Rx: Not on ACE/ARB/SGLT2  Last urine from 2023 - bland  No NSAID use  Plan:  Okay for cardiac cath per nephrology. Would recommend IVF 4 hours post procedure. Also encouraged increase oral intake when not NPO.   Explained to the patient about IV contrast and possible DANYA and if emergent the need to HD incase of renal failure. Pt understands.  Patient stated his father was on HD for 7 years and he \"wants to try to avoid that at all cost\".  Explained to patient about post discharge labs and wanting to monitor his creatinine.  Patient agreeable.  Blood pressure stable at this time  Monitor I&O, labs and avoid hypotension/fluctuations in blood pressure post procedure as well as upon admission to the floor If admitted)  Avoid NSAIDS  Would recommend patient to follow outpatient with nephrology upon discharge.  Benign hypertension with chronic kidney disease, stage III (HCC)  Current blood pressure acceptable  Fluid status: Euvolemic  Home Rx: Amlodipine 10 mg tablet by mouth daily, Toprol-XL 25 mg by mouth in a.m  Avoid hypotension/fluctuations in blood pressure  At risk for electrolyte imbalance  Electrolytes stable at this time  Monitor and replace as necessary  Anemia  Hemoglobin as of 4/12/2025: 13.9  No iron panel noted  Monitor hemoglobin and replete as necessary  Aortic stenosis  Due for " TAVR 4/25/25        HISTORY OF PRESENT ILLNESS:  Requesting Physician: Marlene Carney DO  Reason for Consult: CKD/risk reduction recommendations    Christian Jones is a 89 y.o. male who was admitted to [facility] for elective cardiac catheterization as part of pre-TAVR evaluation.  PMH: CKD, hypertension, hyperlipidemia, basal cell carcinoma, heart murmur. Patient recently hospitalized on 411 due to strokelike symptoms.  Was complaining of dizziness, nausea and vomiting as well as left lower extremity weakness.  Patient was not given TNK.  EKG changes noted with scheduled for cardiac catheterization. A renal consultation is requested today for assistance in the management of CKD/risk reduction recommendations.  Per patient does not follow nephrology outpatient.  Patient did state his father was on dialysis for 7 years and would like to avoid that at all cost.  No change in diet however encouraged to drink more fluids while at home.     Patient seen and assessed [place] [facility].  Patient awake alert and oriented denies pain.  No shortness of breath or chest pain.  Denies any issues voiding.  Denies headaches, nausea, vomiting, diarrhea.  IV fluids infusing pre-procedure.  Patient euvolemic    PAST MEDICAL HISTORY:  Past Medical History:   Diagnosis Date    Aortic valve, bicuspid 12/1/2024    Arthritis 1/2000    Basal cell carcinoma     Head    Depression 1/1990    Ear problems 1980    GERD (gastroesophageal reflux disease) 1/1990    Heart murmur long standing    Hyperlipemia     Hypertension     Positive fecal occult blood test     Psoriasis     Rectal bleeding     Tinnitus 1980    continuing    Visual impairment 1/1950    corrected       PAST SURGICAL HISTORY:  Past Surgical History:   Procedure Laterality Date    BASAL CELL CARCINOMA EXCISION      CARDIAC CATHETERIZATION N/A 4/15/2025    Procedure: Cardiac Coronary Angiogram;  Surgeon: Marlene Carney DO;  Location: [facility] CARDIAC CATH LAB;  Service: Cardiology    COLONOSCOPY       COLONOSCOPY  2019    HERNIA REPAIR  2012    Inguinal    LIPOMA RESECTION      Scalp    SIGMOIDOSCOPY         ALLERGIES:  No Known Allergies    SOCIAL HISTORY:  Social History     Substance and Sexual Activity   Alcohol Use Yes    Alcohol/week: 13.0 standard drinks of alcohol    Types: 7 Glasses of wine, 6 Cans of beer per week    Comment: drink with meals     Social History     Substance and Sexual Activity   Drug Use Never     Social History     Tobacco Use   Smoking Status Former    Current packs/day: 0.00    Average packs/day: 0.3 packs/day for 15.0 years (3.8 ttl pk-yrs)    Types: Pipe, Cigarettes    Start date:     Quit date: 1985    Years since quittin.9   Smokeless Tobacco Never       FAMILY HISTORY:  Family History   Problem Relation Age of Onset    Hypertension Father     Kidney disease Father     Thrombosis Mother         cause of death    Heart failure Mother                 MEDICATIONS:    Current Facility-Administered Medications:     sodium chloride 0.9 % bolus 209.7 mL, 3 mL/kg, Intravenous, Once, Last Rate: 104.9 mL/hr at 25 0900, 209.7 mL at 25 0900 **FOLLOWED BY** sodium chloride 0.9 % infusion, 50 mL/hr, Intravenous, Continuous, KAI Schroeder    REVIEW OF SYSTEMS:  Constitutional: Negative for fatigue, anorexia, fever, chills, diaphoresis  HENT: Negative for postnasal drip  Eyes: Negative for visual disturbance.   Respiratory: Negative for cough, shortness of breath and wheezing.   Cardiovascular: Negative for chest pain, palpitations and leg swelling.   Gastrointestinal: Negative for abdominal pain, constipation, diarrhea, nausea and vomiting.   Genitourinary: No dysuria, hematuria  Endocrine: Negative for polyuria.   Musculoskeletal: Negative for arthralgias, back pain and joint swelling.   Skin: Negative for rash.   Neurological: Negative for focal weakness, headaches, dizziness.  Hematological: Negative for easy bruising or  "bleeding.  Psychiatric/Behavioral: Negative for confusion and sleep disturbance.   All the systems were reviewed and were negative except as documented on the HPI.    PHYSICAL EXAM:  Current Weight: Weight - Scale: 68 kg (150 lb)  First Weight: Weight - Scale: 68 kg (150 lb)  Vitals:    04/25/25 0850   BP: 127/69   BP Location: Right arm   Pulse: 84   Resp: 16   Temp: 98.3 °F (36.8 °C)   TempSrc: Oral   SpO2: 97%   Weight: 68 kg (150 lb)   Height: 5' 4\" (1.626 m)     No intake or output data in the 24 hours ending 04/25/25 0958  Physical Exam  Vitals and nursing note reviewed.   Constitutional:       General: He is not in acute distress.  HENT:      Head: Normocephalic.      Mouth/Throat:      Mouth: Mucous membranes are moist.   Cardiovascular:      Rate and Rhythm: Normal rate.      Pulses: Normal pulses.      Heart sounds: Murmur heard.      No gallop.   Pulmonary:      Effort: No respiratory distress.      Breath sounds: Normal breath sounds. No wheezing or rales.   Abdominal:      General: Bowel sounds are normal. There is no distension.      Palpations: Abdomen is soft.      Tenderness: There is no abdominal tenderness.   Musculoskeletal:      Right lower leg: No edema.      Left lower leg: No edema.   Skin:     General: Skin is warm and dry.      Capillary Refill: Capillary refill takes less than 2 seconds.   Neurological:      Mental Status: He is alert and oriented to person, place, and time. Mental status is at baseline.   Psychiatric:         Mood and Affect: Mood normal.         Behavior: Behavior normal.       Invasive Devices:      Lab Results:   Results from last 7 days   Lab Units 04/25/25  0902   POTASSIUM mmol/L 3.9   CHLORIDE mmol/L 109*   CO2 mmol/L 23   BUN mg/dL 23   CREATININE mg/dL 1.33*   CALCIUM mg/dL 8.6     Other Studies:     Portions of the record may have been created with voice recognition software. Occasional wrong word or \"sound a like\" substitutions may have occurred due to the " inherent limitations of voice recognition software. Read the chart carefully and recognize, using context, where substitutions have occurred.If you have any questions, please contact the dictating provider.

## 2025-04-25 NOTE — QUICK NOTE
Called by Dr. Carney in regards to a large amount of swelling proximal to the radial arterial band.  Manual pressure held for 15 minutes and swelling reduced.  Second radial band applied above the the initial radial band.  Instructions given to patient and staff in regards to caring for this band.  Encouraged to release air out slowly.  And patient should not use his hand to do any activity.  Radial pulse was strong and palpable.

## 2025-04-25 NOTE — ASSESSMENT & PLAN NOTE
Current blood pressure acceptable  Fluid status: Euvolemic  Home Rx: Amlodipine 10 mg tablet by mouth daily, Toprol-XL 25 mg by mouth in a.m  Avoid hypotension/fluctuations in blood pressure

## 2025-04-25 NOTE — ASSESSMENT & PLAN NOTE
"Etiology: hypertensive nephrosclerosis + age related nephron loss  Per Epic records, Care Everywhere, and patient, does not appear to follow outpatient with nephrology  Baseline creatinine: 1.2-1.5 since 2023  Current creatinine 1.33-in baseline  Home Rx: Not on ACE/ARB/SGLT2  Last urine from 2023 - bland  No NSAID use  Plan:  Okay for cardiac cath per nephrology. Would recommend IVF 4 hours post procedure. Also encouraged increase oral intake when not NPO.   Explained to the patient about IV contrast and possible DANYA and if emergent the need to HD incase of renal failure. Pt understands.  Patient stated his father was on HD for 7 years and he \"wants to try to avoid that at all cost\".  Explained to patient about post discharge labs and wanting to monitor his creatinine.  Patient agreeable.  Blood pressure stable at this time  Monitor I&O, labs and avoid hypotension/fluctuations in blood pressure post procedure as well as upon admission to the floor If admitted)  Avoid NSAIDS  Would recommend patient to follow outpatient with nephrology upon discharge.  "

## 2025-04-25 NOTE — ASSESSMENT & PLAN NOTE
Hemoglobin as of 4/12/2025: 13.9  No iron panel noted  Monitor hemoglobin and replete as necessary

## 2025-04-26 VITALS
BODY MASS INDEX: 25.61 KG/M2 | SYSTOLIC BLOOD PRESSURE: 141 MMHG | RESPIRATION RATE: 18 BRPM | HEIGHT: 64 IN | TEMPERATURE: 97.5 F | WEIGHT: 150 LBS | DIASTOLIC BLOOD PRESSURE: 89 MMHG | HEART RATE: 84 BPM | OXYGEN SATURATION: 98 %

## 2025-04-26 LAB
ANION GAP SERPL CALCULATED.3IONS-SCNC: 7 MMOL/L (ref 4–13)
BUN SERPL-MCNC: 20 MG/DL (ref 5–25)
CALCIUM SERPL-MCNC: 8.3 MG/DL (ref 8.4–10.2)
CHLORIDE SERPL-SCNC: 111 MMOL/L (ref 96–108)
CO2 SERPL-SCNC: 21 MMOL/L (ref 21–32)
CREAT SERPL-MCNC: 1.13 MG/DL (ref 0.6–1.3)
ERYTHROCYTE [DISTWIDTH] IN BLOOD BY AUTOMATED COUNT: 14 % (ref 11.6–15.1)
GFR SERPL CREATININE-BSD FRML MDRD: 57 ML/MIN/1.73SQ M
GLUCOSE SERPL-MCNC: 88 MG/DL (ref 65–140)
HCT VFR BLD AUTO: 38.2 % (ref 36.5–49.3)
HGB BLD-MCNC: 12.4 G/DL (ref 12–17)
MCH RBC QN AUTO: 29.1 PG (ref 26.8–34.3)
MCHC RBC AUTO-ENTMCNC: 32.5 G/DL (ref 31.4–37.4)
MCV RBC AUTO: 90 FL (ref 82–98)
PLATELET # BLD AUTO: 226 THOUSANDS/UL (ref 149–390)
PMV BLD AUTO: 10.9 FL (ref 8.9–12.7)
POTASSIUM SERPL-SCNC: 4 MMOL/L (ref 3.5–5.3)
RBC # BLD AUTO: 4.26 MILLION/UL (ref 3.88–5.62)
SODIUM SERPL-SCNC: 139 MMOL/L (ref 135–147)
WBC # BLD AUTO: 9.18 THOUSAND/UL (ref 4.31–10.16)

## 2025-04-26 PROCEDURE — 85027 COMPLETE CBC AUTOMATED: CPT | Performed by: STUDENT IN AN ORGANIZED HEALTH CARE EDUCATION/TRAINING PROGRAM

## 2025-04-26 PROCEDURE — 80048 BASIC METABOLIC PNL TOTAL CA: CPT | Performed by: STUDENT IN AN ORGANIZED HEALTH CARE EDUCATION/TRAINING PROGRAM

## 2025-04-26 PROCEDURE — 99214 OFFICE O/P EST MOD 30 MIN: CPT | Performed by: INTERNAL MEDICINE

## 2025-04-26 RX ORDER — ATORVASTATIN CALCIUM 80 MG/1
80 TABLET, FILM COATED ORAL
Qty: 30 TABLET | Refills: 0 | Status: SHIPPED | OUTPATIENT
Start: 2025-04-26

## 2025-04-26 RX ORDER — CLOPIDOGREL BISULFATE 75 MG/1
75 TABLET ORAL DAILY
Qty: 90 TABLET | Refills: 3 | Status: SHIPPED | OUTPATIENT
Start: 2025-04-27 | End: 2025-05-08

## 2025-04-26 RX ADMIN — AMLODIPINE BESYLATE 10 MG: 10 TABLET ORAL at 09:15

## 2025-04-26 RX ADMIN — METOPROLOL SUCCINATE 25 MG: 25 TABLET, EXTENDED RELEASE ORAL at 09:15

## 2025-04-26 RX ADMIN — CLOPIDOGREL BISULFATE 75 MG: 75 TABLET, FILM COATED ORAL at 09:15

## 2025-04-26 RX ADMIN — ASPIRIN 81 MG CHEWABLE TABLET 81 MG: 81 TABLET CHEWABLE at 09:15

## 2025-04-26 RX ADMIN — PANTOPRAZOLE SODIUM 40 MG: 40 TABLET, DELAYED RELEASE ORAL at 05:02

## 2025-04-26 NOTE — ASSESSMENT & PLAN NOTE
Admitted for an elective cardiac cath as part of TAVR workup found to have severe diffuse two-vessel CAD, staged PCI with 3 drug-eluting stents placed to LAD on 4/25  Developed hematoma above radial band requiring manual pressure yesterday

## 2025-04-26 NOTE — ASSESSMENT & PLAN NOTE
Pressure overall well-controlled  Fluid status: Euvolemic  Home Rx: Amlodipine 10 mg tablet by mouth daily, Toprol-XL 25 mg by mouth in a.m

## 2025-04-26 NOTE — PROGRESS NOTES
Progress Note - Nephrology   Name: Christian Jones 89 y.o. male I MRN: [id number]  Unit/Bed#: [facility] 426-01 I Date of Admission: 4/25/2025   Date of Service: 4/26/2025 I Hospital Day: 0    Assessment & Plan  CKD stage 3a, GFR 45-59 ml/min ([facility])  Etiology: hypertensive nephrosclerosis + age related nephron loss  Creatinine fluctuating around 1.2-1.5  Plan:  Renal function is stable day #1 postcardiac cath with a creatinine down to 1.13.  Patient received IV fluids pre and post.  Recommend BMP 3 days after cardiac catheterization.  Benign hypertension with chronic kidney disease, stage III ([facility])  Pressure overall well-controlled  Fluid status: Euvolemic  Home Rx: Amlodipine 10 mg tablet by mouth daily, Toprol-XL 25 mg by mouth in a.m    Anemia  Hemoglobin stable at 12.4  Aortic stenosis  Admitted for an elective cardiac cath as part of TAVR workup found to have severe diffuse two-vessel CAD, staged PCI with 3 drug-eluting stents placed to LAD on 4/25  Developed hematoma above radial band requiring manual pressure yesterday  Coronary artery disease involving native coronary artery  Status post elective cardiac cath, severe diffuse two-vessel CAD, staged PCI with 3 drug-eluting stents placed to LAD on 4/25    I have reviewed the nephrology recommendations including stable from kidney standpoint to be to be discharged, needs repeat BMP in 3 days, and we are in agreement with renal plan including the information outlined above.     Subjective   Brief History of Admission -     Patient seen and examined, feeling okay, ready to go home, no complaints.      Objective :  Temp:  [97.3 °F (36.3 °C)-98.1 °F (36.7 °C)] 97.5 °F (36.4 °C)  HR:  [] 84  BP: (104-152)/(59-89) 141/89  Resp:  [16-18] 18  SpO2:  [94 %-100 %] 98 %  O2 Device: None (Room air)  Nasal Cannula O2 Flow Rate (L/min):  [2 L/min] 2 L/min    Current Weight: Weight - Scale: 68 kg (150 lb)  First Weight: Weight - Scale: 68 kg (150 lb)  I/O         04/24  0701  04/25 0700 04/25 0701  04/26 0700 04/26 0701  04/27 0700    P.O.  222 600    I.V. (mL/kg)  987.5 (14.5)     Total Intake(mL/kg)  1209.5 (17.8) 600 (8.8)    Urine (mL/kg/hr)  600 0 (0)    Total Output  600 0    Net  +609.5 +600                 Physical Exam  General: conscious, cooperative, in not acute distress  Eyes: conjunctivae pink, anicteric sclerae  ENT: lips and mucous membranes moist  Neck: supple, no JVD  Chest: clear breath sounds bilateral, no crackles, ronchus or wheezings  CVS: distinct S1 & S2, normal rate, regular rhythm  Abdomen: soft, non-tender, non-distended, normoactive bowel sounds  Extremities: no edema of both legs  Skin: no rash  Neuro: awake, alert, oriented    Medications:    Current Facility-Administered Medications:     acetaminophen (TYLENOL) tablet 650 mg, 650 mg, Oral, Q4H PRN, Wellington Schneider MD    alfuzosin (UROXATRAL) 24 hr tablet 10 mg, 10 mg, Oral, Daily, Wellington Schneider MD    amLODIPine (NORVASC) tablet 10 mg, 10 mg, Oral, Daily, Wellington Schneider MD, 10 mg at 04/26/25 0915    aspirin chewable tablet 81 mg, 81 mg, Oral, Daily, Wellington Schneider MD, 81 mg at 04/26/25 0915    atorvastatin (LIPITOR) tablet 80 mg, 80 mg, Oral, Daily With Dinner, Wellington Schneider MD, 80 mg at 04/25/25 1712    clopidogrel (PLAVIX) tablet 75 mg, 75 mg, Oral, Daily, Wellington Schneider MD, 75 mg at 04/26/25 0915    meclizine (ANTIVERT) tablet 25 mg, 25 mg, Oral, Q8H PRN, Wellington Schneider MD    metoprolol succinate (TOPROL-XL) 24 hr tablet 25 mg, 25 mg, Oral, Daily, Wellington Schneider MD, 25 mg at 04/26/25 0915    ondansetron (ZOFRAN) injection 4 mg, 4 mg, Intravenous, Q6H PRN, Wellington Schneider MD    pantoprazole (PROTONIX) EC tablet 40 mg, 40 mg, Oral, Early Morning, Wellington Schneider MD, 40 mg at 04/26/25 0502      Lab Results: I have reviewed the following results:  Results from last 7 days   Lab Units 04/26/25  0450 04/25/25  0902   WBC Thousand/uL 9.18  --    HEMOGLOBIN g/dL 12.4  --    HEMATOCRIT % 38.2  --   "  PLATELETS Thousands/uL 226  --    POTASSIUM mmol/L 4.0 3.9   CHLORIDE mmol/L 111* 109*   CO2 mmol/L 21 23   BUN mg/dL 20 23   CREATININE mg/dL 1.13 1.33*   CALCIUM mg/dL 8.3* 8.6       Administrative Statements     Portions of the record may have been created with voice recognition software. Occasional wrong word or \"sound a like\" substitutions may have occurred due to the inherent limitations of voice recognition software. Read the chart carefully and recognize, using context, where substitutions have occurred.If you have any questions, please contact the dictating provider.  "

## 2025-04-26 NOTE — ASSESSMENT & PLAN NOTE
Etiology: hypertensive nephrosclerosis + age related nephron loss  Creatinine fluctuating around 1.2-1.5  Plan:  Renal function is stable day #1 postcardiac cath with a creatinine down to 1.13.  Patient received IV fluids pre and post.  Recommend BMP 3 days after cardiac catheterization.

## 2025-04-26 NOTE — PLAN OF CARE
Problem: PAIN - ADULT  Goal: Verbalizes/displays adequate comfort level or baseline comfort level  Description: Interventions:- Encourage patient to monitor pain and request assistance- Assess pain using appropriate pain scale- Administer analgesics based on type and severity of pain and evaluate response- Implement non-pharmacological measures as appropriate and evaluate response- Consider cultural and social influences on pain and pain management- Notify physician/advanced practitioner if interventions unsuccessful or patient reports new pain  Outcome: Progressing     Problem: INFECTION - ADULT  Goal: Absence or prevention of progression during hospitalization  Description: INTERVENTIONS:- Assess and monitor for signs and symptoms of infection- Monitor lab/diagnostic results- Monitor all insertion sites, i.e. indwelling lines, tubes, and drains- Monitor endotracheal if appropriate and nasal secretions for changes in amount and color- [place] appropriate cooling/warming therapies per order- Administer medications as ordered- Instruct and encourage patient and family to use good hand hygiene technique- Identify and instruct in appropriate isolation precautions for identified infection/condition  Outcome: Progressing  Goal: Absence of fever/infection during neutropenic period  Description: INTERVENTIONS:- Monitor WBC  Outcome: Progressing     Problem: DISCHARGE PLANNING  Goal: Discharge to home or other facility with appropriate resources  Description: INTERVENTIONS:- Identify barriers to discharge w/patient and caregiver- Arrange for needed discharge resources and transportation as appropriate- Identify discharge learning needs (meds, wound care, etc.)- Arrange for interpretive services to assist at discharge as needed- Refer to Case Management Department for coordinating discharge planning if the patient needs post-hospital services based on physician/advanced practitioner order or complex needs related to  functional status, cognitive ability, or social support system  Outcome: Progressing

## 2025-04-26 NOTE — ASSESSMENT & PLAN NOTE
Status post elective cardiac cath, severe diffuse two-vessel CAD, staged PCI with 3 drug-eluting stents placed to LAD on 4/25

## 2025-04-26 NOTE — RESTORATIVE TECHNICIAN NOTE
Restorative Technician Note      Patient Name: Christian Jones     Restorative Tech Visit Date: 04/26/25  Note Type: Mobility  Patient Position Upon Consult: Supine  Activity Performed: Ambulated  Assistive Device: Other (Comment) (none)  Patient Position at End of Consult: Seated edge of bed; All needs within reach; Bed/Chair alarm activated

## 2025-04-26 NOTE — DISCHARGE SUMMARY
Discharge Summary    Christian Jones 89 y.o. male MRN: [id number]    Unit/Bed#: [facility] 426-01 Encounter: [id number]      Admission Date: 4/25/2025   Discharge Date: 4/26/2025      Admitting Diagnosis: Coronary artery disease involving native coronary artery, unspecified whether angina present, unspecified whether native or transplanted heart [I25.10]  Discharge Diagnosis: Active Problems:    Aortic stenosis    CKD stage 3a, GFR 45-59 ml/min ([facility])    Benign hypertension with chronic kidney disease, stage III ([facility])    At risk for electrolyte imbalance    Anemia       Condition at Discharge: good   Disposition: Home  Planned Readmission: No    HPI/Reason For Admission:   Christian is an 89-year-old gentleman who had recently established with Dr. Maier in the office having last been seen in February 2025.  He has no history of aortic stenosis and is being followed by cardiothoracic surgery for evaluation of TAVR.  He also has CKD with baseline creatinine around 1.2-1.5.  On April 15, 2025 he underwent elective catheterization for pre-TAVR workup and was found to have severe diffuse two-vessel CAD.  Staged PCI was then planned.    Hospital Course:   He returns to the hospital on April 25, 2025 for PCI to LAD.  He had 3 drug-eluting stents placed.  Afterwards he did have small hematoma above radial band requiring manual pressure.  A second radial band was placed about the first.  Due to his CKD he is staying overnight for IV fluids and to be monitored.  He is on dual antiplatelet therapy with aspirin and clopidogrel.  He resides near [place] and a follow up appointment has been made.  He does have upcoming follow-up with cardiac surgery.      He was seen and examined on April 26th. R wrist site with small hematoma without bleed or ooze. TR band x2 removed since 4am. Todays CBC WNL. No post-cath symptoms of concern today.     Procedures Performed:   Orders Placed This Encounter   Procedures    Cardiac catheterization        Complications: none    Other Significant Findings/Treatment: None      Discharge Medications:  See after visit summary for reconciled discharge medications provided to patient and family.      Discharge instructions/Information to patient and family:   See after visit summary for information provided to patient and family.      Provisions for Follow-Up Care:  See after visit summary for information related to follow-up care and any pertinent home health orders.      Discharge Statement   I had direct contact with the patient on the day of discharge.  I spent 30 minutes discharging the patient. This time was spent on the day of discharge including: education, examination, paperwork.  All questions were answered to patient satisfaction.

## 2025-05-02 ENCOUNTER — APPOINTMENT (OUTPATIENT)
Dept: LAB | Facility: CLINIC | Age: 89
End: 2025-05-02
Payer: COMMERCIAL

## 2025-05-02 ENCOUNTER — LAB REQUISITION (OUTPATIENT)
Dept: LAB | Facility: HOSPITAL | Age: 89
End: 2025-05-02
Payer: COMMERCIAL

## 2025-05-02 ENCOUNTER — OFFICE VISIT (OUTPATIENT)
Dept: CARDIAC SURGERY | Facility: CLINIC | Age: 89
End: 2025-05-02
Payer: COMMERCIAL

## 2025-05-02 VITALS
DIASTOLIC BLOOD PRESSURE: 65 MMHG | BODY MASS INDEX: 26.51 KG/M2 | OXYGEN SATURATION: 97 % | HEIGHT: 64 IN | SYSTOLIC BLOOD PRESSURE: 111 MMHG | HEART RATE: 78 BPM | WEIGHT: 155.3 LBS

## 2025-05-02 DIAGNOSIS — I35.0 NONRHEUMATIC AORTIC VALVE STENOSIS: Primary | ICD-10-CM

## 2025-05-02 DIAGNOSIS — Z01.812 ENCOUNTER FOR PRE-OPERATIVE LABORATORY TESTING: ICD-10-CM

## 2025-05-02 DIAGNOSIS — Z01.810 PRE-OPERATIVE CARDIOVASCULAR EXAMINATION: ICD-10-CM

## 2025-05-02 DIAGNOSIS — I35.0 NONRHEUMATIC AORTIC (VALVE) STENOSIS: ICD-10-CM

## 2025-05-02 DIAGNOSIS — I25.10 CORONARY ARTERY DISEASE INVOLVING NATIVE CORONARY ARTERY OF NATIVE HEART WITHOUT ANGINA PECTORIS: ICD-10-CM

## 2025-05-02 DIAGNOSIS — I35.0 NONRHEUMATIC AORTIC VALVE STENOSIS: ICD-10-CM

## 2025-05-02 LAB
ABO GROUP BLD: NORMAL
ALBUMIN SERPL BCG-MCNC: 3.7 G/DL (ref 3.5–5)
ALP SERPL-CCNC: 111 U/L (ref 34–104)
ALT SERPL W P-5'-P-CCNC: 55 U/L (ref 7–52)
ANION GAP SERPL CALCULATED.3IONS-SCNC: 10 MMOL/L (ref 4–13)
AST SERPL W P-5'-P-CCNC: 30 U/L (ref 13–39)
BASOPHILS # BLD AUTO: 0.05 THOUSANDS/ÂΜL (ref 0–0.1)
BASOPHILS NFR BLD AUTO: 1 % (ref 0–1)
BILIRUB SERPL-MCNC: 0.56 MG/DL (ref 0.2–1)
BLD GP AB SCN SERPL QL: NEGATIVE
BUN SERPL-MCNC: 22 MG/DL (ref 5–25)
CALCIUM SERPL-MCNC: 8.9 MG/DL (ref 8.4–10.2)
CHLORIDE SERPL-SCNC: 108 MMOL/L (ref 96–108)
CO2 SERPL-SCNC: 22 MMOL/L (ref 21–32)
CREAT SERPL-MCNC: 1.33 MG/DL (ref 0.6–1.3)
EOSINOPHIL # BLD AUTO: 0.62 THOUSAND/ÂΜL (ref 0–0.61)
EOSINOPHIL NFR BLD AUTO: 9 % (ref 0–6)
ERYTHROCYTE [DISTWIDTH] IN BLOOD BY AUTOMATED COUNT: 14.2 % (ref 11.6–15.1)
GFR SERPL CREATININE-BSD FRML MDRD: 47 ML/MIN/1.73SQ M
GLUCOSE P FAST SERPL-MCNC: 99 MG/DL (ref 65–99)
HCT VFR BLD AUTO: 38.8 % (ref 36.5–49.3)
HGB BLD-MCNC: 12.1 G/DL (ref 12–17)
IMM GRANULOCYTES # BLD AUTO: 0.04 THOUSAND/UL (ref 0–0.2)
IMM GRANULOCYTES NFR BLD AUTO: 1 % (ref 0–2)
INR PPP: 1.07 (ref 0.85–1.19)
LYMPHOCYTES # BLD AUTO: 1.66 THOUSANDS/ÂΜL (ref 0.6–4.47)
LYMPHOCYTES NFR BLD AUTO: 23 % (ref 14–44)
MCH RBC QN AUTO: 28.5 PG (ref 26.8–34.3)
MCHC RBC AUTO-ENTMCNC: 31.2 G/DL (ref 31.4–37.4)
MCV RBC AUTO: 92 FL (ref 82–98)
MONOCYTES # BLD AUTO: 0.77 THOUSAND/ÂΜL (ref 0.17–1.22)
MONOCYTES NFR BLD AUTO: 11 % (ref 4–12)
NEUTROPHILS # BLD AUTO: 4.12 THOUSANDS/ÂΜL (ref 1.85–7.62)
NEUTS SEG NFR BLD AUTO: 55 % (ref 43–75)
NRBC BLD AUTO-RTO: 0 /100 WBCS
PLATELET # BLD AUTO: 243 THOUSANDS/UL (ref 149–390)
PMV BLD AUTO: 11.6 FL (ref 8.9–12.7)
POTASSIUM SERPL-SCNC: 4.3 MMOL/L (ref 3.5–5.3)
PROT SERPL-MCNC: 6.3 G/DL (ref 6.4–8.4)
PROTHROMBIN TIME: 14.2 SECONDS (ref 12.3–15)
RBC # BLD AUTO: 4.24 MILLION/UL (ref 3.88–5.62)
RH BLD: POSITIVE
SODIUM SERPL-SCNC: 140 MMOL/L (ref 135–147)
SPECIMEN EXPIRATION DATE: NORMAL
WBC # BLD AUTO: 7.26 THOUSAND/UL (ref 4.31–10.16)

## 2025-05-02 PROCEDURE — 86901 BLOOD TYPING SEROLOGIC RH(D): CPT | Performed by: PHYSICIAN ASSISTANT

## 2025-05-02 PROCEDURE — 85025 COMPLETE CBC W/AUTO DIFF WBC: CPT

## 2025-05-02 PROCEDURE — 99214 OFFICE O/P EST MOD 30 MIN: CPT | Performed by: THORACIC SURGERY (CARDIOTHORACIC VASCULAR SURGERY)

## 2025-05-02 PROCEDURE — 86850 RBC ANTIBODY SCREEN: CPT | Performed by: PHYSICIAN ASSISTANT

## 2025-05-02 PROCEDURE — 86900 BLOOD TYPING SEROLOGIC ABO: CPT | Performed by: PHYSICIAN ASSISTANT

## 2025-05-02 PROCEDURE — 36415 COLL VENOUS BLD VENIPUNCTURE: CPT

## 2025-05-02 PROCEDURE — 80053 COMPREHEN METABOLIC PANEL: CPT

## 2025-05-02 PROCEDURE — 87081 CULTURE SCREEN ONLY: CPT

## 2025-05-02 PROCEDURE — 85610 PROTHROMBIN TIME: CPT

## 2025-05-02 RX ORDER — MUPIROCIN 20 MG/G
OINTMENT TOPICAL 2 TIMES DAILY
Qty: 22 G | Refills: 0 | Status: SHIPPED | OUTPATIENT
Start: 2025-05-02

## 2025-05-02 RX ORDER — CHLORHEXIDINE GLUCONATE ORAL RINSE 1.2 MG/ML
15 SOLUTION DENTAL ONCE
OUTPATIENT
Start: 2025-05-02 | End: 2025-05-02

## 2025-05-02 RX ORDER — CEFAZOLIN SODIUM 2 G/50ML
2000 SOLUTION INTRAVENOUS ONCE
OUTPATIENT
Start: 2025-05-02 | End: 2025-05-02

## 2025-05-02 NOTE — PROGRESS NOTES
Consultation - Cardiac Surgery   Christian Jones 89 y.o. male MRN: [id number]      Reason for Consult / Principal Problem: Aortic stenosis, Non-Rheumatic    History of Present Illness: Christian Jones is a 89 y.o. year old male who was previously evaluated in our office by Jered Caballero M.D. for transcatheter aortic valve replacement.  During this initial consultation, arrangements were made for the following studies to be completed: gated CTA of the chest, abdomen, and pelvis, cardiac catheterization, dental clearance, and carotid ultrasound.     Christian Jones now presents to review the results of these tests and confirm the suitability of proceeding with transcatheter aortic valve replacement.    Since Christian's initial consultation in March, he was seen at [facility] due to dizziness, and a stroke alert was called. CT head without acute findings, CTA head and neck also negative for acute ischemia, but did demonstrate high grade stenosis of right vertebral artery, moderate bilateral ICA stenosis - right 50-60%, left 60-70%. He also had a cardiac catheterization which demonstrated a small aneurysm of proximal LAD and several lesions along the LAD. He went back on 4/25 for planned PCI - had TRUNG x3 throughout LAD, and POBA of ostial Lcx (jailed by TRUNG).     In review, Christian has a PMHx known AS, HTN, HLD, CKD3a, BCC with excision and flap on his scalp (patient states in the 90's), arthritis, h/o rectal bleeding. He was recently evaluated by Dr. Maier, and has an echo from February of this year that demonstrates progression of his aortic stenosis - this has been monitored since about 2021. Over the last year, he has become progressively more symptomatic. He was identified by Jazmin for TAVR evaluation.      Upon interview today, patient admits that for the past year he has been experiencing dyspnea on exertion especially with walking long distances and going up hills and stairs.  He denies chest pain,  palpitations, LE edema, LH, syncope or presyncope, orthopnea, PND. He lives independently with his wife in a 55[place] community. He takes care of his wife, as she had a stroke 2 years ago. Patient is a remote former smoker, drinks a glass of wine about 3 days a week, no recreational drug use. He sees his dentist regularly, and dental clearance has been obtained.         Past Medical History:  Past Medical History:   Diagnosis Date    Aortic valve, bicuspid 2024    Arthritis 2000    Basal cell carcinoma     Head    Depression 1990    Ear problems     GERD (gastroesophageal reflux disease) 1990    Heart murmur long standing    Hyperlipemia     Hypertension     Positive fecal occult blood test     Psoriasis     Rectal bleeding     Tinnitus     continuing    Visual impairment 1950    corrected         Past Surgical History:   Past Surgical History:   Procedure Laterality Date    BASAL CELL CARCINOMA EXCISION      CARDIAC CATHETERIZATION N/A 4/15/2025    Procedure: Cardiac Coronary Angiogram;  Surgeon: Marlene Carney DO;  Location: [facility];  Service: Cardiology    CARDIAC CATHETERIZATION N/A 2025    Procedure: Cardiac PCI;  Surgeon: Marlene Carney DO;  Location: [facility];  Service: Cardiology    CARDIAC CATHETERIZATION N/A 2025    Procedure: Cardiac PCI Stent;  Surgeon: Marlene Carnye DO;  Location: [facility];  Service: Cardiology    COLONOSCOPY      COLONOSCOPY  2019    HERNIA REPAIR  2012    Inguinal    LIPOMA RESECTION      Scalp    SIGMOIDOSCOPY           Family History:  Family History   Problem Relation Age of Onset    Hypertension Father     Kidney disease Father     Thrombosis Mother         cause of death    Heart failure Mother                   Social History:    Social History     Substance and Sexual Activity   Alcohol Use Yes    Alcohol/week: 13.0 standard drinks of alcohol    Types: 7 Glasses of wine, 6 Cans of beer per  week    Comment: drink with meals     Social History     Substance and Sexual Activity   Drug Use Never     Social History     Tobacco Use   Smoking Status Former    Current packs/day: 0.00    Average packs/day: 0.3 packs/day for 15.0 years (3.8 ttl pk-yrs)    Types: Pipe, Cigarettes    Start date:     Quit date: 1985    Years since quittin.9   Smokeless Tobacco Never       Home Medications:   Prior to Admission medications    Medication Sig Start Date End Date Taking? Authorizing Provider   acetaminophen (TYLENOL) 650 mg CR tablet Take 650 mg by mouth every 8 (eight) hours as needed for mild pain For arthritis pain   Yes Historical Provider, MD   alfuzosin (UROXATRAL) 10 mg 24 hr tablet TAKE 1 TABLET BY MOUTH DAILY 3/17/25  Yes KAI Vegas   amLODIPine (NORVASC) 10 mg tablet TAKE 1 TABLET BY MOUTH EVERY DAY 24  Yes Marvin Meadows MD   aspirin 81 mg chewable tablet Chew 1 tablet (81 mg total) daily 25 Yes Karen Orantes DO   atorvastatin (LIPITOR) 80 mg tablet Take 1 tablet (80 mg total) by mouth daily 4/15/25  Yes KAI Reyes   atorvastatin (LIPITOR) 80 mg tablet Take 1 tablet (80 mg total) by mouth daily with dinner 25  Yes Renard Lopez PA-C   clopidogrel (PLAVIX) 75 mg tablet Take 1 tablet (75 mg total) by mouth daily for 21 days Do not start before 2025. 25 Yes Karen Orantes DO   clopidogrel (PLAVIX) 75 mg tablet Take 1 tablet (75 mg total) by mouth daily 25  Yes Renard Lopez PA-C   meclizine (ANTIVERT) 25 mg tablet Take 1 tablet (25 mg total) by mouth every 8 (eight) hours as needed for dizziness or nausea 25  Yes Karen Orantes DO   metoprolol succinate (TOPROL-XL) 25 mg 24 hr tablet Take 1 tablet (25 mg total) by mouth daily In AM 25  Yes Michael Maier MD   Multiple Vitamin (multivitamin) tablet Take 1 tablet by mouth daily   Yes Historical Provider, MD   mupirocin (BACTROBAN) 2 % ointment Apply  "topically 2 (two) times a day 5/2/25  Yes Jennifer Goss PA-C   pantoprazole (PROTONIX) 40 mg tablet Take 1 tablet (40 mg total) by mouth daily 4/12/25 5/12/25 Yes Karen Orantes DO       Allergies:  No Known Allergies    Review of Systems:     Review of Systems   Constitutional:  Positive for activity change. Negative for chills and fever.   HENT:  Negative for ear pain and sore throat.    Eyes:  Negative for pain and visual disturbance.   Respiratory:  Positive for shortness of breath. Negative for cough.    Cardiovascular:  Negative for chest pain and palpitations.   Gastrointestinal:  Negative for abdominal pain and vomiting.   Genitourinary:  Negative for dysuria and hematuria.   Musculoskeletal:  Negative for arthralgias and back pain.   Skin:  Negative for color change and rash.   Neurological:  Negative for seizures and syncope.   All other systems reviewed and are negative.      Vital Signs:     Vitals:    05/02/25 0855 05/02/25 0857   BP: 112/56 111/65   BP Location: Right arm Left arm   Patient Position: Sitting Sitting   Cuff Size: Adult Standard   Pulse: 78    SpO2: 97%    Weight: 70.4 kg (155 lb 4.8 oz)    Height: 5' 4\" (1.626 m)        Physical Exam:     Physical Exam  Vitals reviewed.   Constitutional:       Appearance: Normal appearance.   HENT:      Head: Normocephalic and atraumatic.   Eyes:      Pupils: Pupils are equal, round, and reactive to light.   Neck:      Vascular: No carotid bruit or JVD.   Cardiovascular:      Rate and Rhythm: Normal rate and regular rhythm.      Pulses:           Carotid pulses are 1+ on the right side and 1+ on the left side.       Radial pulses are 2+ on the right side and 2+ on the left side.        Dorsalis pedis pulses are 2+ on the right side and 2+ on the left side.      Heart sounds: Murmur heard.      Systolic murmur is present with a grade of 3/6.      No friction rub. No gallop.   Pulmonary:      Effort: Pulmonary effort is normal.      Breath sounds: " "Normal breath sounds. No wheezing, rhonchi or rales.   Abdominal:      Palpations: Abdomen is soft.      Tenderness: There is no abdominal tenderness.   Musculoskeletal:      Cervical back: Normal range of motion.   Skin:     General: Skin is warm and dry.      Capillary Refill: Capillary refill takes less than 2 seconds.   Neurological:      General: No focal deficit present.      Mental Status: He is alert.      Sensory: Sensation is intact.   Psychiatric:         Attention and Perception: Attention normal.         Mood and Affect: Mood normal.         Behavior: Behavior normal. Behavior is cooperative.         Lab Results:     Results from last 7 days   Lab Units 04/26/25  0450   WBC Thousand/uL 9.18   HEMOGLOBIN g/dL 12.4   HEMATOCRIT % 38.2   PLATELETS Thousands/uL 226     Results from last 7 days   Lab Units 04/26/25  0450   POTASSIUM mmol/L 4.0   CHLORIDE mmol/L 111*   CO2 mmol/L 21   BUN mg/dL 20   CREATININE mg/dL 1.13   CALCIUM mg/dL 8.3*         Lab Results   Component Value Date    HGBA1C 6.0 (H) 04/11/2025     No results found for: \"CKTOTAL\", \"CKMB\", \"CKMBINDEX\", \"TROPONINI\"    Imaging Studies:     Echocardiogram: 4/11/25  Left Ventricle Left ventricular cavity size is normal. Wall thickness is increased. There is mild asymmetric hypertrophy of the basal septal wall. The left ventricular ejection fraction is 65% by visual estimation. Systolic function is normal. Wall motion is normal. Diastolic function is mildly abnormal, consistent with grade I (abnormal) relaxation.   Right Ventricle Right ventricular cavity size is normal. Systolic function is normal.   Left Atrium The atrium is mildly dilated.   Right Atrium The atrium is normal in size.   Atrial Septum The interatrial septum appears to be grossly normal without evidence of shunting by color-flow Doppler.   Aortic Valve The leaflets are moderately thickened. The leaflets are moderately calcified. There is no evidence of regurgitation. There is " moderate stenosis with Vmax 3.24 m/s, TESSIE 1.02 cm2, DVI 0.30. mean PG 26.2 mmHg, LVOTd 2.1, LVOT VTI 21.8, AV VTI 73.71   Mitral Valve There is mild calcification. The leaflets exhibit normal mobility.  There is trace regurgitation. There is no evidence of stenosis.   Tricuspid Valve The leaflets exhibit normal mobility. There is mild regurgitation. There is no evidence of stenosis. Pulmonary artery systolic pressures are estimated at 26 mmHg.   Pulmonic Valve The pulmonic valve was not well visualized. There is no evidence of regurgitation. There is no evidence of stenosis.   Ascending Aorta The ascending aorta is normal in size.   IVC/SVC The inferior vena cava is normal in size. Respirophasic changes were normal.   Pericardium There is no pericardial effusion. The pericardium is normal in appearance.       Gated CTA of the chest/abdomen/pelvis: 4/2/25  IMPRESSION:     Measurements and analysis to allow for planning of Transcatheter Aortic Valve Repair as above.     Extensive atherosclerotic changes with excrescent atheroma, including both calcified and noncalcified components protruding into the lumen of the infrarenal abdominal aorta. Tortuosity of common iliac arteries.     Extensive arterial vascular atherosclerotic changes with severe atherosclerotic stenosis at both right and left renal artery origins.     Giant duodenal diverticulum. Extensive colonic diverticulosis. No evidence for acute diverticulitis.       Cardiac catheterization: 4/15/25  Left Main   The vessel was visualized by angiography and is moderate in size. There is mild diffuse disease throughout the vessel.      Left Anterior Descending   The vessel was visualized by angiography and is moderate in size. There is severe diffuse disease throughout the vessel. The vessel has a small aneurysm in the proximal segment.   Mid LAD lesion is 65% stenosed. Culprit lesion.   Mid LAD to Dist LAD lesion is 70% stenosed. Culprit lesion. DAVID flow is 2.    Dist LAD lesion is 80% stenosed. Culprit lesion. DAVID flow is 2.      Left Circumflex   The vessel was visualized by angiography and is moderate in size. The vessel exhibits minimal luminal irregularities.      First Obtuse Marginal Branch      Right Coronary Artery   The vessel was visualized by angiography and is moderate in size. There is severe diffuse disease throughout the vessel.   Prox RCA lesion is 50% stenosed. Not the culprit lesion. DAVID flow is 3.   Dist RCA lesion is 65% stenosed. Culprit lesion. DAVID flow is 3.      Right Posterior Descending Artery   The vessel is moderate in size and was visualized. There is severe diffuse disease throughout the vessel.   RPDA lesion is 90% stenosed. Culprit lesion. DAVID flow is 3.          Carotid artery ultrasound: 4/2/25  Impression  RIGHT:  There is <50% stenosis noted in the internal carotid artery. Plaque is  heterogenous and irregular.  Vertebral artery flow is antegrade. There is no significant subclavian artery  disease.  LEFT:  There is <50% stenosis noted in the internal carotid artery. Plaque is  heterogenous and irregular.  Vertebral artery flow is antegrade. There is no significant subclavian artery  disease.    CTA Head and Neck Stroke Alert: 4/11/25  FINDINGS:     CTA NECK  ARCH AND GREAT VESSELS: Bovine configuration noted. Moderate atherosclerotic changes with no severe stenosis.  VERTEBRAL ARTERIES: Mild to moderate stenosis at the bilateral vertebral artery origin secondary to calcified atheromatous plaquing. Patent extracranial segments.  RIGHT CAROTID: Calcified and noncalcified atheromatous plaquing at the carotid bifurcation and proximal cervical ICA segment. Approximately 50 to 60%.    No dissection.  LEFT CAROTID: Calcified and noncalcified atheromatous plaquing at the carotid bifurcation and proximal cervical ICA segment. Approximately 60 to 70%.   No dissection.  NASCET criteria was used to determine the degree of internal carotid  artery diameter stenosis.     CTA BRAIN:  INTERNAL CAROTID ARTERIES: Calcified atheromatous plaquing involving the bilateral cavernous and supraclinoid segments with mild to moderate narrowing, most severe at the distal right supraclinoid segment. No high-grade stenosis or occlusion.  ANTERIOR CEREBRAL ARTERY CIRCULATION:  No stenosis or occlusion.  MIDDLE CEREBRAL ARTERY CIRCULATION:  No stenosis or occlusion.  DISTAL VERTEBRAL ARTERIES: Short segment high-grade stenosis at the proximal mid right intradural vertebral artery secondary to calcified and noncalcified atheromatous plaquing. Similar atherosclerotic of the proximal left intradural vertebral artery   resulting in mild to moderate stenosis.  BASILAR ARTERY:  No stenosis or occlusion.  POSTERIOR CEREBRAL ARTERIES: No stenosis or occlusion. Fetal-type left PCA noted. A patent left posterior communicating artery is seen. Hypoplastic/absent right posterior communicating artery.  VENOUS STRUCTURES: Patent     NON VASCULAR ANATOMY  BRAIN: Post contrast imaging in the arterial phase is unremarkable.  No delayed imaging of the brain was obtained.     BONY STRUCTURES:  No acute osseous abnormality. Multilevel cervical degenerative changes.     SOFT TISSUES OF THE NECK: No acute cervical soft tissue abnormality is seen.     THORACIC INLET: Mild dependent atelectatic changes. No consolidations in the visualized lung fields. Mild fluid distention seen within the mid thoracic esophagus, nonspecific, possibly on the basis of dysmotility and/or reflux. No significant pulmonary   arterial filling defect seen.        IMPRESSION:     No evidence for acute large vessel occlusive disease.  Short segment high-grade stenosis at the proximal mid intradural right vertebral artery secondary to calcified and noncalcified atheromatous plaque.  Focal moderate stenosis at the right ICA distal supraclinoid segment.     Moderate stenosis at the bilateral ICA origins secondary to  calcified and noncalcified atheromatous plaquing, left worse than right (approximately 50 to 60% on the right and 60 to 70% on the left based on NASCET criteria).    Results Review Statement: I personally reviewed the following image studies in PACS and associated radiology reports: echo, CTA, cath, carotids, CTA head and neck. My interpretation of the radiology images/reports is: agree with above interpretation.    TAVR evaluation Assessment:     5 Meter Walk Test:      Attempt 1: 7   Attempt 2: 6   Attempt 3: 6    STS Risk Score: 5.46 %, mortality risk    Aortic Stenosis Stage: D2    [facility]: III    KCCQ-12 was completed    Assessment:  Patient Active Problem List    Diagnosis Date Noted    CKD stage 3a, GFR 45-59 ml/min ([facility]) 04/25/2025    Benign hypertension with chronic kidney disease, stage III ([facility]) 04/25/2025    At risk for electrolyte imbalance 04/25/2025    Anemia 04/25/2025    Coronary artery disease involving native coronary artery 04/15/2025    Essential hypertension 04/11/2025    Stroke-like symptoms 04/11/2025    Abnormal EKG 04/11/2025    NSVT (nonsustained ventricular tachycardia) ([facility]) 04/11/2025    Pulmonary edema 04/11/2025    Aortic stenosis 01/20/2025    Dyspnea on exertion 01/20/2025    Peripheral vascular disease ([facility]) 09/13/2024    Depression, recurrent ([facility]) 02/13/2023    CKD (chronic kidney disease) stage 3, GFR 30-59 ml/min ([facility]) 02/07/2022    Rectal bleeding 03/20/2019     Impression/Plan:    Christian Jones has symptomatic severe aortic stenosis. They have undergone testing for transcatheter aortic valve replacement.  The results of these studies have been interpreted by the multidisciplinary TAVR team who have determined the patient to be Intermediate risk for open aortic valve replacement based on other pre-existing comobidities not reflected in the STS risk score.     The risks, benefits, and alternatives to TAVR were discussed in detail with the patient today. They understand and  wish to proceed with transfemoral transcatheter aortic valve replacement.  Informed consent was obtained and a date for the intervention has been set.    Pre-op instructions reviewed with patient and they were instructed to hold MVI starting now. Continue Plavix due to recent TRUNG.     Christian Jones was comfortable with our recommendations, and their questions were answered to their satisfaction.       SIGNATURE: Jennifer Goss PA-C  DATE: May 2, 2025  TIME: 9:57 AM

## 2025-05-02 NOTE — H&P
Preop History and Physical - Cardiac Surgery   Christian Jones 89 y.o. male MRN: [id number]      Reason for Consult / Principal Problem: Aortic stenosis, Non-Rheumatic    History of Present Illness: Christian Jones is a 89 y.o. year old male who was previously evaluated in our office by Jered Caballero M.D. for transcatheter aortic valve replacement.  During this initial consultation, arrangements were made for the following studies to be completed: gated CTA of the chest, abdomen, and pelvis, cardiac catheterization, dental clearance, and carotid ultrasound.     Christian Jones now presents to review the results of these tests and confirm the suitability of proceeding with transcatheter aortic valve replacement.    Since Christian's initial consultation in March, he was seen at [facility] due to dizziness, and a stroke alert was called. CT head without acute findings, CTA head and neck also negative for acute ischemia, but did demonstrate high grade stenosis of right vertebral artery, moderate bilateral ICA stenosis - right 50-60%, left 60-70%. He also had a cardiac catheterization which demonstrated a small aneurysm of proximal LAD and several lesions along the LAD. He went back on 4/25 for planned PCI - had TRUNG x3 throughout LAD, and POBA of ostial Lcx (jailed by TRUNG).     In review, Christian has a PMHx known AS, HTN, HLD, CKD3a, BCC with excision and flap on his scalp (patient states in the 90's), arthritis, h/o rectal bleeding. He was recently evaluated by Dr. Maier, and has an echo from February of this year that demonstrates progression of his aortic stenosis - this has been monitored since about 2021. Over the last year, he has become progressively more symptomatic. He was identified by Jazmin for TAVR evaluation.      Upon interview today, patient admits that for the past year he has been experiencing dyspnea on exertion especially with walking long distances and going up hills and stairs.  He denies  chest pain, palpitations, LE edema, LH, syncope or presyncope, orthopnea, PND. He lives independently with his wife in a 55[place] community. He takes care of his wife, as she had a stroke 2 years ago. Patient is a remote former smoker, drinks a glass of wine about 3 days a week, no recreational drug use. He sees his dentist regularly, and dental clearance has been obtained.         Past Medical History:  Past Medical History:   Diagnosis Date    Aortic valve, bicuspid 2024    Arthritis 2000    Basal cell carcinoma     Head    Depression 1990    Ear problems     GERD (gastroesophageal reflux disease) 1990    Heart murmur long standing    Hyperlipemia     Hypertension     Positive fecal occult blood test     Psoriasis     Rectal bleeding     Tinnitus     continuing    Visual impairment 1950    corrected         Past Surgical History:   Past Surgical History:   Procedure Laterality Date    BASAL CELL CARCINOMA EXCISION      CARDIAC CATHETERIZATION N/A 4/15/2025    Procedure: Cardiac Coronary Angiogram;  Surgeon: Marlene Carney DO;  Location: [facility];  Service: Cardiology    CARDIAC CATHETERIZATION N/A 2025    Procedure: Cardiac PCI;  Surgeon: Marlene Carney DO;  Location: [facility];  Service: Cardiology    CARDIAC CATHETERIZATION N/A 2025    Procedure: Cardiac PCI Stent;  Surgeon: Marlene Carney DO;  Location: [facility];  Service: Cardiology    COLONOSCOPY      COLONOSCOPY  2019    HERNIA REPAIR  2012    Inguinal    LIPOMA RESECTION      Scalp    SIGMOIDOSCOPY           Family History:  Family History   Problem Relation Age of Onset    Hypertension Father     Kidney disease Father     Thrombosis Mother         cause of death    Heart failure Mother                   Social History:    Social History     Substance and Sexual Activity   Alcohol Use Yes    Alcohol/week: 13.0 standard drinks of alcohol    Types: 7 Glasses of wine, 6 Cans of  beer per week    Comment: drink with meals     Social History     Substance and Sexual Activity   Drug Use Never     Social History     Tobacco Use   Smoking Status Former    Current packs/day: 0.00    Average packs/day: 0.3 packs/day for 15.0 years (3.8 ttl pk-yrs)    Types: Pipe, Cigarettes    Start date:     Quit date: 1985    Years since quittin.9   Smokeless Tobacco Never       Home Medications:   Prior to Admission medications    Medication Sig Start Date End Date Taking? Authorizing Provider   acetaminophen (TYLENOL) 650 mg CR tablet Take 650 mg by mouth every 8 (eight) hours as needed for mild pain For arthritis pain   Yes Historical Provider, MD   alfuzosin (UROXATRAL) 10 mg 24 hr tablet TAKE 1 TABLET BY MOUTH DAILY 3/17/25  Yes KAI Vegas   amLODIPine (NORVASC) 10 mg tablet TAKE 1 TABLET BY MOUTH EVERY DAY 24  Yes Marvin Meadows MD   aspirin 81 mg chewable tablet Chew 1 tablet (81 mg total) daily 25 Yes Karen Orantes DO   atorvastatin (LIPITOR) 80 mg tablet Take 1 tablet (80 mg total) by mouth daily 4/15/25  Yes KAI Reyes   atorvastatin (LIPITOR) 80 mg tablet Take 1 tablet (80 mg total) by mouth daily with dinner 25  Yes Renard Lopez PA-C   clopidogrel (PLAVIX) 75 mg tablet Take 1 tablet (75 mg total) by mouth daily for 21 days Do not start before 2025. 25 Yes Karen Orantes DO   clopidogrel (PLAVIX) 75 mg tablet Take 1 tablet (75 mg total) by mouth daily 25  Yes Renard Lopez PA-C   meclizine (ANTIVERT) 25 mg tablet Take 1 tablet (25 mg total) by mouth every 8 (eight) hours as needed for dizziness or nausea 25  Yes Karen Orantes DO   metoprolol succinate (TOPROL-XL) 25 mg 24 hr tablet Take 1 tablet (25 mg total) by mouth daily In AM 25  Yes Michael Maier MD   Multiple Vitamin (multivitamin) tablet Take 1 tablet by mouth daily   Yes Historical Provider, MD   mupirocin (BACTROBAN) 2 %  "ointment Apply topically 2 (two) times a day 5/2/25  Yes Jennifer Goss PA-C   pantoprazole (PROTONIX) 40 mg tablet Take 1 tablet (40 mg total) by mouth daily 4/12/25 5/12/25 Yes Karen Orantes DO       Allergies:  No Known Allergies    Review of Systems:     Review of Systems   Constitutional:  Positive for activity change. Negative for chills and fever.   HENT:  Negative for ear pain and sore throat.    Eyes:  Negative for pain and visual disturbance.   Respiratory:  Positive for shortness of breath. Negative for cough.    Cardiovascular:  Negative for chest pain and palpitations.   Gastrointestinal:  Negative for abdominal pain and vomiting.   Genitourinary:  Negative for dysuria and hematuria.   Musculoskeletal:  Negative for arthralgias and back pain.   Skin:  Negative for color change and rash.   Neurological:  Negative for seizures and syncope.   All other systems reviewed and are negative.      Vital Signs:     Vitals:    05/02/25 0855 05/02/25 0857   BP: 112/56 111/65   BP Location: Right arm Left arm   Patient Position: Sitting Sitting   Cuff Size: Adult Standard   Pulse: 78    SpO2: 97%    Weight: 70.4 kg (155 lb 4.8 oz)    Height: 5' 4\" (1.626 m)        Physical Exam:     Physical Exam  Vitals reviewed.   Constitutional:       Appearance: Normal appearance.   HENT:      Head: Normocephalic and atraumatic.   Eyes:      Pupils: Pupils are equal, round, and reactive to light.   Neck:      Vascular: No carotid bruit or JVD.   Cardiovascular:      Rate and Rhythm: Normal rate and regular rhythm.      Pulses:           Carotid pulses are 1+ on the right side and 1+ on the left side.       Radial pulses are 2+ on the right side and 2+ on the left side.        Dorsalis pedis pulses are 2+ on the right side and 2+ on the left side.      Heart sounds: Murmur heard.      Systolic murmur is present with a grade of 3/6.      No friction rub. No gallop.   Pulmonary:      Effort: Pulmonary effort is normal.      " "Breath sounds: Normal breath sounds. No wheezing, rhonchi or rales.   Abdominal:      Palpations: Abdomen is soft.      Tenderness: There is no abdominal tenderness.   Musculoskeletal:      Cervical back: Normal range of motion.   Skin:     General: Skin is warm and dry.      Capillary Refill: Capillary refill takes less than 2 seconds.   Neurological:      General: No focal deficit present.      Mental Status: He is alert.      Sensory: Sensation is intact.   Psychiatric:         Attention and Perception: Attention normal.         Mood and Affect: Mood normal.         Behavior: Behavior normal. Behavior is cooperative.         Lab Results:     Results from last 7 days   Lab Units 04/26/25  0450   WBC Thousand/uL 9.18   HEMOGLOBIN g/dL 12.4   HEMATOCRIT % 38.2   PLATELETS Thousands/uL 226     Results from last 7 days   Lab Units 04/26/25  0450   POTASSIUM mmol/L 4.0   CHLORIDE mmol/L 111*   CO2 mmol/L 21   BUN mg/dL 20   CREATININE mg/dL 1.13   CALCIUM mg/dL 8.3*         Lab Results   Component Value Date    HGBA1C 6.0 (H) 04/11/2025     No results found for: \"CKTOTAL\", \"CKMB\", \"CKMBINDEX\", \"TROPONINI\"    Imaging Studies:     Echocardiogram: 4/11/25  Left Ventricle Left ventricular cavity size is normal. Wall thickness is increased. There is mild asymmetric hypertrophy of the basal septal wall. The left ventricular ejection fraction is 65% by visual estimation. Systolic function is normal. Wall motion is normal. Diastolic function is mildly abnormal, consistent with grade I (abnormal) relaxation.   Right Ventricle Right ventricular cavity size is normal. Systolic function is normal.   Left Atrium The atrium is mildly dilated.   Right Atrium The atrium is normal in size.   Atrial Septum The interatrial septum appears to be grossly normal without evidence of shunting by color-flow Doppler.   Aortic Valve The leaflets are moderately thickened. The leaflets are moderately calcified. There is no evidence of " regurgitation. There is moderate stenosis with Vmax 3.24 m/s, TESSIE 1.02 cm2, DVI 0.30. mean PG 26.2 mmHg, LVOTd 2.1, LVOT VTI 21.8, AV VTI 73.71   Mitral Valve There is mild calcification. The leaflets exhibit normal mobility.  There is trace regurgitation. There is no evidence of stenosis.   Tricuspid Valve The leaflets exhibit normal mobility. There is mild regurgitation. There is no evidence of stenosis. Pulmonary artery systolic pressures are estimated at 26 mmHg.   Pulmonic Valve The pulmonic valve was not well visualized. There is no evidence of regurgitation. There is no evidence of stenosis.   Ascending Aorta The ascending aorta is normal in size.   IVC/SVC The inferior vena cava is normal in size. Respirophasic changes were normal.   Pericardium There is no pericardial effusion. The pericardium is normal in appearance.       Gated CTA of the chest/abdomen/pelvis: 4/2/25  IMPRESSION:     Measurements and analysis to allow for planning of Transcatheter Aortic Valve Repair as above.     Extensive atherosclerotic changes with excrescent atheroma, including both calcified and noncalcified components protruding into the lumen of the infrarenal abdominal aorta. Tortuosity of common iliac arteries.     Extensive arterial vascular atherosclerotic changes with severe atherosclerotic stenosis at both right and left renal artery origins.     Giant duodenal diverticulum. Extensive colonic diverticulosis. No evidence for acute diverticulitis.       Cardiac catheterization: 4/15/25  Left Main   The vessel was visualized by angiography and is moderate in size. There is mild diffuse disease throughout the vessel.      Left Anterior Descending   The vessel was visualized by angiography and is moderate in size. There is severe diffuse disease throughout the vessel. The vessel has a small aneurysm in the proximal segment.   Mid LAD lesion is 65% stenosed. Culprit lesion.   Mid LAD to Dist LAD lesion is 70% stenosed. Culprit  lesion. DAVID flow is 2.   Dist LAD lesion is 80% stenosed. Culprit lesion. DAVID flow is 2.      Left Circumflex   The vessel was visualized by angiography and is moderate in size. The vessel exhibits minimal luminal irregularities.      First Obtuse Marginal Branch      Right Coronary Artery   The vessel was visualized by angiography and is moderate in size. There is severe diffuse disease throughout the vessel.   Prox RCA lesion is 50% stenosed. Not the culprit lesion. DAVID flow is 3.   Dist RCA lesion is 65% stenosed. Culprit lesion. DAVID flow is 3.      Right Posterior Descending Artery   The vessel is moderate in size and was visualized. There is severe diffuse disease throughout the vessel.   RPDA lesion is 90% stenosed. Culprit lesion. DAVID flow is 3.          Carotid artery ultrasound: 4/2/25  Impression  RIGHT:  There is <50% stenosis noted in the internal carotid artery. Plaque is  heterogenous and irregular.  Vertebral artery flow is antegrade. There is no significant subclavian artery  disease.  LEFT:  There is <50% stenosis noted in the internal carotid artery. Plaque is  heterogenous and irregular.  Vertebral artery flow is antegrade. There is no significant subclavian artery  disease.    CTA Head and Neck Stroke Alert: 4/11/25  FINDINGS:     CTA NECK  ARCH AND GREAT VESSELS: Bovine configuration noted. Moderate atherosclerotic changes with no severe stenosis.  VERTEBRAL ARTERIES: Mild to moderate stenosis at the bilateral vertebral artery origin secondary to calcified atheromatous plaquing. Patent extracranial segments.  RIGHT CAROTID: Calcified and noncalcified atheromatous plaquing at the carotid bifurcation and proximal cervical ICA segment. Approximately 50 to 60%.    No dissection.  LEFT CAROTID: Calcified and noncalcified atheromatous plaquing at the carotid bifurcation and proximal cervical ICA segment. Approximately 60 to 70%.   No dissection.  NASCET criteria was used to determine the degree  of internal carotid artery diameter stenosis.     CTA BRAIN:  INTERNAL CAROTID ARTERIES: Calcified atheromatous plaquing involving the bilateral cavernous and supraclinoid segments with mild to moderate narrowing, most severe at the distal right supraclinoid segment. No high-grade stenosis or occlusion.  ANTERIOR CEREBRAL ARTERY CIRCULATION:  No stenosis or occlusion.  MIDDLE CEREBRAL ARTERY CIRCULATION:  No stenosis or occlusion.  DISTAL VERTEBRAL ARTERIES: Short segment high-grade stenosis at the proximal mid right intradural vertebral artery secondary to calcified and noncalcified atheromatous plaquing. Similar atherosclerotic of the proximal left intradural vertebral artery   resulting in mild to moderate stenosis.  BASILAR ARTERY:  No stenosis or occlusion.  POSTERIOR CEREBRAL ARTERIES: No stenosis or occlusion. Fetal-type left PCA noted. A patent left posterior communicating artery is seen. Hypoplastic/absent right posterior communicating artery.  VENOUS STRUCTURES: Patent     NON VASCULAR ANATOMY  BRAIN: Post contrast imaging in the arterial phase is unremarkable.  No delayed imaging of the brain was obtained.     BONY STRUCTURES:  No acute osseous abnormality. Multilevel cervical degenerative changes.     SOFT TISSUES OF THE NECK: No acute cervical soft tissue abnormality is seen.     THORACIC INLET: Mild dependent atelectatic changes. No consolidations in the visualized lung fields. Mild fluid distention seen within the mid thoracic esophagus, nonspecific, possibly on the basis of dysmotility and/or reflux. No significant pulmonary   arterial filling defect seen.        IMPRESSION:     No evidence for acute large vessel occlusive disease.  Short segment high-grade stenosis at the proximal mid intradural right vertebral artery secondary to calcified and noncalcified atheromatous plaque.  Focal moderate stenosis at the right ICA distal supraclinoid segment.     Moderate stenosis at the bilateral ICA origins  secondary to calcified and noncalcified atheromatous plaquing, left worse than right (approximately 50 to 60% on the right and 60 to 70% on the left based on NASCET criteria).    Results Review Statement: I personally reviewed the following image studies in PACS and associated radiology reports: echo, CTA, cath, carotids, CTA head and neck. My interpretation of the radiology images/reports is: agree with above interpretation.    TAVR evaluation Assessment:     5 Meter Walk Test:      Attempt 1: 7   Attempt 2: 6   Attempt 3: 6    STS Risk Score: 5.46 %, mortality risk    Aortic Stenosis Stage: D2    [facility]: III    KCCQ-12 was completed    Assessment:  Patient Active Problem List    Diagnosis Date Noted    CKD stage 3a, GFR 45-59 ml/min ([facility]) 04/25/2025    Benign hypertension with chronic kidney disease, stage III ([facility]) 04/25/2025    At risk for electrolyte imbalance 04/25/2025    Anemia 04/25/2025    Coronary artery disease involving native coronary artery 04/15/2025    Essential hypertension 04/11/2025    Stroke-like symptoms 04/11/2025    Abnormal EKG 04/11/2025    NSVT (nonsustained ventricular tachycardia) ([facility]) 04/11/2025    Pulmonary edema 04/11/2025    Aortic stenosis 01/20/2025    Dyspnea on exertion 01/20/2025    Peripheral vascular disease ([facility]) 09/13/2024    Depression, recurrent ([facility]) 02/13/2023    CKD (chronic kidney disease) stage 3, GFR 30-59 ml/min ([facility]) 02/07/2022    Rectal bleeding 03/20/2019     Impression/Plan:    Christian Jones has symptomatic severe aortic stenosis. They have undergone testing for transcatheter aortic valve replacement.  The results of these studies have been interpreted by the multidisciplinary TAVR team who have determined the patient to be Intermediate risk for open aortic valve replacement based on other pre-existing comobidities not reflected in the STS risk score.     The risks, benefits, and alternatives to TAVR were discussed in detail with the patient today. They  understand and wish to proceed with transfemoral transcatheter aortic valve replacement.  Informed consent was obtained and a date for the intervention has been set.    Pre-op instructions reviewed with patient and they were instructed to hold MVI starting now. Continue Plavix due to recent TRUNG.     Christian Jones was comfortable with our recommendations, and their questions were answered to their satisfaction.       SIGNATURE: Jennifer Goss PA-C  DATE: May 2, 2025  TIME: 9:57 AM

## 2025-05-03 LAB — MRSA NOSE QL CULT: NORMAL

## 2025-05-05 ENCOUNTER — ANESTHESIA EVENT (OUTPATIENT)
Dept: PERIOP | Facility: HOSPITAL | Age: 89
DRG: 267 | End: 2025-05-05
Payer: MEDICARE

## 2025-05-05 RX ORDER — PHENYLEPHRINE HCL IN 0.9% NACL 1 MG/10 ML
200-2000 SYRINGE (ML) INTRAVENOUS ONCE
Status: CANCELLED | OUTPATIENT
Start: 2025-05-06

## 2025-05-05 NOTE — ANESTHESIA PREPROCEDURE EVALUATION
Procedure:  REPLACEMENT AORTIC VALVE TRANSCATHETER (TAVR) TRANSFEMORAL W/ 26MM VALVE(ACCESS ON RIGHT) HIEN (Chest)  Cardiac tavr (Chest)    Relevant Problems   ANESTHESIA (within normal limits)      CARDIO   (+) Aortic stenosis   (+) Coronary artery disease involving native coronary artery   (+) Dyspnea on exertion   (+) Essential hypertension   (+) Peripheral vascular disease (HCC)      /RENAL   (+) Benign hypertension with chronic kidney disease, stage III ([facility])   (+) CKD (chronic kidney disease) stage 3, GFR 30-59 ml/min (HCC)   (+) CKD stage 3a, GFR 45-59 ml/min (HCC)      HEMATOLOGY   (+) Anemia      NEURO/PSYCH   (+) Depression, recurrent (HCC)      PULMONARY   (+) Dyspnea on exertion      Cardiovascular/Peripheral Vascular   (+) NSVT (nonsustained ventricular tachycardia) ([facility])   Findings    Left Ventricle Left ventricular cavity size is normal. Wall thickness is increased. There is mild asymmetric hypertrophy of the basal septal wall. The left ventricular ejection fraction is 65% by visual estimation. Systolic function is normal. Wall motion is normal. Diastolic function is mildly abnormal, consistent with grade I (abnormal) relaxation.   Right Ventricle Right ventricular cavity size is normal. Systolic function is normal.   Left Atrium The atrium is mildly dilated.   Right Atrium The atrium is normal in size.   Atrial Septum The interatrial septum appears to be grossly normal without evidence of shunting by color-flow Doppler.   Aortic Valve The leaflets are moderately thickened. The leaflets are moderately calcified. There is no evidence of regurgitation. There is moderate stenosis with Vmax 3.24 m/s, TESSIE 1.02 cm2, DVI 0.30. mean PG 26.2 mmHg, LVOTd 2.1, LVOT VTI 21.8, AV VTI 73.71   Mitral Valve There is mild calcification. The leaflets exhibit normal mobility.  There is trace regurgitation. There is no evidence of stenosis.   Tricuspid Valve The leaflets exhibit normal mobility. There is mild  regurgitation. There is no evidence of stenosis. Pulmonary artery systolic pressures are estimated at 26 mmHg.   Pulmonic Valve The pulmonic valve was not well visualized. There is no evidence of regurgitation. There is no evidence of stenosis.   Ascending Aorta The ascending aorta is normal in size.   IVC/SVC The inferior vena cava is normal in size. Respirophasic changes were normal.   Pericardium There is no pericardial effusion. The pericardium is normal in appearance.       Impression         Severe, Diffuse, 2V-CAD: multifocal LAD disease with a prox 65% aneursymal lesion, mid long 70% lesion, and distal 80% lesion / LCX with mild diffuse disease / multifocal RCA disease with a distal 65% lesion and a mid RPDA 90% lesion       Impression         S/P PCI with TRUNG x 3 from the Mid LM / pLAD and then the Mid LAD x 1    S/P POBA to the Ostial LCX jailed by the Prior TRUNG    Prox LAD to Mid LAD lesion is 70% stenosed.    Sinus rhythm with Premature ventricular complexes  Otherwise normal ECG  When compared with ECG of 25-Apr-2025 08:30, (unconfirmed)  No significant change was found     Physical Exam    Airway    Mallampati score: II  TM Distance: >3 FB  Neck ROM: full     Dental        Cardiovascular  Rhythm: regular, Rate: normal, Murmur    Pulmonary  Pulmonary exam normal Breath sounds clear to auscultation    Other Findings        Anesthesia Plan  ASA Score- 4     Anesthesia Type- general with ASA Monitors.         Additional Monitors: arterial line and central venous line.    Airway Plan: ETT.    Comment: HIEN.       Plan Factors-Exercise tolerance (METS): <4 METS.    Chart reviewed. EKG reviewed.  Existing labs reviewed. Patient summary reviewed.    Patient is not a current smoker.              Induction- intravenous.    Postoperative Plan- Plan for postoperative opioid use. Planned trial extubation        Informed Consent- Anesthetic plan and risks discussed with patient.  I personally reviewed this patient  with the CRNA. Discussed and agreed on the Anesthesia Plan with the CRNA..      NPO Status:  No vitals data found for the desired time range.

## 2025-05-06 ENCOUNTER — APPOINTMENT (INPATIENT)
Dept: RADIOLOGY | Facility: HOSPITAL | Age: 89
DRG: 267 | End: 2025-05-06
Payer: MEDICARE

## 2025-05-06 ENCOUNTER — APPOINTMENT (INPATIENT)
Dept: NON INVASIVE DIAGNOSTICS | Facility: HOSPITAL | Age: 89
DRG: 267 | End: 2025-05-06
Payer: MEDICARE

## 2025-05-06 ENCOUNTER — HOSPITAL ENCOUNTER (INPATIENT)
Facility: HOSPITAL | Age: 89
LOS: 2 days | Discharge: HOME HEALTH | DRG: 267 | End: 2025-05-08
Attending: THORACIC SURGERY (CARDIOTHORACIC VASCULAR SURGERY) | Admitting: THORACIC SURGERY (CARDIOTHORACIC VASCULAR SURGERY)
Payer: MEDICARE

## 2025-05-06 ENCOUNTER — ANESTHESIA (OUTPATIENT)
Dept: PERIOP | Facility: HOSPITAL | Age: 89
DRG: 267 | End: 2025-05-06
Payer: MEDICARE

## 2025-05-06 DIAGNOSIS — I35.0 NONRHEUMATIC AORTIC VALVE STENOSIS: Primary | ICD-10-CM

## 2025-05-06 DIAGNOSIS — I35.9 NONRHEUMATIC AORTIC VALVE DISORDER: ICD-10-CM

## 2025-05-06 DIAGNOSIS — Z95.3 S/P TAVR (TRANSCATHETER AORTIC VALVE REPLACEMENT), BIOPROSTHETIC: Primary | ICD-10-CM

## 2025-05-06 DIAGNOSIS — I35.0 AORTIC VALVE STENOSIS, ETIOLOGY OF CARDIAC VALVE DISEASE UNSPECIFIED: ICD-10-CM

## 2025-05-06 DIAGNOSIS — I47.29 NSVT (NONSUSTAINED VENTRICULAR TACHYCARDIA) (HCC): ICD-10-CM

## 2025-05-06 DIAGNOSIS — Z95.2 S/P TAVR (TRANSCATHETER AORTIC VALVE REPLACEMENT): ICD-10-CM

## 2025-05-06 DIAGNOSIS — I44.7 NEW ONSET LEFT BUNDLE BRANCH BLOCK (LBBB): ICD-10-CM

## 2025-05-06 PROBLEM — Z91.81 AT RISK FOR FALLS: Status: ACTIVE | Noted: 2025-05-06

## 2025-05-06 PROBLEM — N40.0 BPH (BENIGN PROSTATIC HYPERPLASIA): Status: ACTIVE | Noted: 2025-05-06

## 2025-05-06 PROBLEM — R54 FRAILTY: Status: ACTIVE | Noted: 2025-05-06

## 2025-05-06 PROBLEM — Z71.89: Status: ACTIVE | Noted: 2025-05-06

## 2025-05-06 LAB
ABO GROUP BLD: NORMAL
ANION GAP SERPL CALCULATED.3IONS-SCNC: 7 MMOL/L (ref 4–13)
AORTIC ROOT: 2.7 CM
AORTIC VALVE MEAN VELOCITY: 11.8 M/S
ASCENDING AORTA: 3.9 CM
ATRIAL RATE: 93 BPM
AV AREA BY CONTINUOUS VTI: 2.2 CM2
AV AREA PEAK VELOCITY: 2.1 CM2
AV LVOT MEAN GRADIENT: 2 MMHG
AV LVOT PEAK GRADIENT: 5 MMHG
AV MEAN PRESS GRAD SYS DOP V1V2: 6 MMHG
AV ORIFICE AREA US: 2.19 CM2
AV PEAK GRADIENT: 11 MMHG
AV VELOCITY RATIO: 0.7
AV VMAX SYS DOP: 1.62 M/S
BASE EXCESS BLDA CALC-SCNC: -3 MMOL/L (ref -2–3)
BASE EXCESS BLDA CALC-SCNC: -6 MMOL/L (ref -2–3)
BASE EXCESS BLDA CALC-SCNC: -6 MMOL/L (ref -2–3)
BSA FOR ECHO PROCEDURE: 1.75 M2
BUN SERPL-MCNC: 21 MG/DL (ref 5–25)
CA-I BLD-SCNC: 1.1 MMOL/L (ref 1.12–1.32)
CA-I BLD-SCNC: 1.11 MMOL/L (ref 1.12–1.32)
CA-I BLD-SCNC: 1.19 MMOL/L (ref 1.12–1.32)
CALCIUM SERPL-MCNC: 8 MG/DL (ref 8.4–10.2)
CHLORIDE SERPL-SCNC: 111 MMOL/L (ref 96–108)
CO2 SERPL-SCNC: 21 MMOL/L (ref 21–32)
CREAT SERPL-MCNC: 1.4 MG/DL (ref 0.6–1.3)
DOP CALC AO VTI: 28.3 CM
DOP CALC LVOT AREA: 3.14 CM2
DOP CALC LVOT CARDIAC INDEX: 3.4 L/MIN/M2
DOP CALC LVOT CARDIAC OUTPUT: 5.96 L/MIN
DOP CALC LVOT DIAMETER: 2 CM
DOP CALC LVOT PEAK VEL VTI: 19.75 CM
DOP CALC LVOT PEAK VEL: 1.08 M/S
DOP CALC LVOT STROKE INDEX: 36 ML/M2
DOP CALC LVOT STROKE VOLUME: 62.02 CM3
E WAVE DECELERATION TIME: 291 MS
E/A RATIO: 0.74
FRACTIONAL SHORTENING: 25 (ref 28–44)
GFR SERPL CREATININE-BSD FRML MDRD: 44 ML/MIN/1.73SQ M
GLUCOSE SERPL-MCNC: 106 MG/DL (ref 65–140)
GLUCOSE SERPL-MCNC: 107 MG/DL (ref 65–140)
GLUCOSE SERPL-MCNC: 109 MG/DL (ref 65–140)
GLUCOSE SERPL-MCNC: 114 MG/DL (ref 65–140)
GLUCOSE SERPL-MCNC: 114 MG/DL (ref 65–140)
GLUCOSE SERPL-MCNC: 115 MG/DL (ref 65–140)
GLUCOSE SERPL-MCNC: 123 MG/DL (ref 65–140)
GLUCOSE SERPL-MCNC: 127 MG/DL (ref 65–140)
HCO3 BLDA-SCNC: 19.2 MMOL/L (ref 22–28)
HCO3 BLDA-SCNC: 19.8 MMOL/L (ref 22–28)
HCO3 BLDA-SCNC: 22.5 MMOL/L (ref 22–28)
HCT VFR BLD AUTO: 35 % (ref 36.5–49.3)
HCT VFR BLD CALC: 29 % (ref 36.5–49.3)
HCT VFR BLD CALC: 29 % (ref 36.5–49.3)
HCT VFR BLD CALC: 35 % (ref 36.5–49.3)
HGB BLD-MCNC: 11.2 G/DL (ref 12–17)
HGB BLDA-MCNC: 11.9 G/DL (ref 12–17)
HGB BLDA-MCNC: 9.9 G/DL (ref 12–17)
HGB BLDA-MCNC: 9.9 G/DL (ref 12–17)
INTERVENTRICULAR SEPTUM IN DIASTOLE (PARASTERNAL SHORT AXIS VIEW): 1.4 CM
INTERVENTRICULAR SEPTUM: 1.4 CM (ref 0.6–1.1)
IVC: 15 MM
KCT BLD-ACNC: 113 SEC (ref 89–137)
KCT BLD-ACNC: 250 SEC (ref 89–137)
KCT BLD-ACNC: 95 SEC (ref 89–137)
LAAS-AP2: 18.7 CM2
LAAS-AP4: 20.2 CM2
LEFT ATRIUM SIZE: 4 CM
LEFT ATRIUM VOLUME (MOD BIPLANE): 58 ML
LEFT ATRIUM VOLUME INDEX (MOD BIPLANE): 33.1 ML/M2
LEFT INTERNAL DIMENSION IN SYSTOLE: 2.7 CM (ref 2.1–4)
LEFT VENTRICULAR INTERNAL DIMENSION IN DIASTOLE: 3.6 CM (ref 3.5–6)
LEFT VENTRICULAR POSTERIOR WALL IN END DIASTOLE: 1.2 CM
LEFT VENTRICULAR STROKE VOLUME: 29 ML
LV EF US.2D.A4C+ESTIMATED: 52 %
LVSV (TEICH): 29 ML
MV E'TISSUE VEL-SEP: 6 CM/S
MV PEAK A VEL: 1.3 M/S
MV PEAK E VEL: 96 CM/S
MV STENOSIS PRESSURE HALF TIME: 84 MS
MV VALVE AREA P 1/2 METHOD: 2.62
P AXIS: 74 DEGREES
PCO2 BLD: 20 MMOL/L (ref 21–32)
PCO2 BLD: 21 MMOL/L (ref 21–32)
PCO2 BLD: 24 MMOL/L (ref 21–32)
PCO2 BLD: 35.6 MM HG (ref 36–44)
PCO2 BLD: 39 MM HG (ref 36–44)
PCO2 BLD: 41.2 MM HG (ref 36–44)
PH BLD: 7.31 [PH] (ref 7.35–7.45)
PH BLD: 7.34 [PH] (ref 7.35–7.45)
PH BLD: 7.34 [PH] (ref 7.35–7.45)
PLATELET # BLD AUTO: 177 THOUSANDS/UL (ref 149–390)
PMV BLD AUTO: 10.8 FL (ref 8.9–12.7)
PO2 BLD: 105 MM HG (ref 75–129)
PO2 BLD: 396 MM HG (ref 75–129)
PO2 BLD: 51 MM HG (ref 75–129)
POTASSIUM BLD-SCNC: 3.6 MMOL/L (ref 3.5–5.3)
POTASSIUM BLD-SCNC: 3.8 MMOL/L (ref 3.5–5.3)
POTASSIUM BLD-SCNC: 3.8 MMOL/L (ref 3.5–5.3)
POTASSIUM SERPL-SCNC: 3.9 MMOL/L (ref 3.5–5.3)
PR INTERVAL: 198 MS
QRS AXIS: 60 DEGREES
QRSD INTERVAL: 144 MS
QT INTERVAL: 448 MS
QTC INTERVAL: 557 MS
RA PRESSURE ESTIMATED: 5 MMHG
RH BLD: POSITIVE
RIGHT ATRIAL 2D VOLUME: 43 ML
RIGHT ATRIUM AREA SYSTOLE A4C: 16.5 CM2
RIGHT VENTRICLE ID DIMENSION: 3.5 CM
RV PSP: 36 MMHG
SAO2 % BLD FROM PO2: 100 % (ref 60–85)
SAO2 % BLD FROM PO2: 84 % (ref 60–85)
SAO2 % BLD FROM PO2: 98 % (ref 60–85)
SINOTUBULAR JUNCTION: 2.4 CM
SL CV LEFT ATRIUM LENGTH A2C: 5.2 CM
SL CV LV EF: 55
SL CV PED ECHO LEFT VENTRICLE DIASTOLIC VOLUME (MOD BIPLANE) 2D: 55 ML
SL CV PED ECHO LEFT VENTRICLE SYSTOLIC VOLUME (MOD BIPLANE) 2D: 27 ML
SL CV SINUS OF VALSALVA 2D: 3.7 CM
SODIUM BLD-SCNC: 140 MMOL/L (ref 136–145)
SODIUM BLD-SCNC: 141 MMOL/L (ref 136–145)
SODIUM BLD-SCNC: 141 MMOL/L (ref 136–145)
SODIUM SERPL-SCNC: 139 MMOL/L (ref 135–147)
SPECIMEN SOURCE: ABNORMAL
SPECIMEN SOURCE: NORMAL
SPECIMEN SOURCE: NORMAL
STJ: 2.4 CM
T WAVE AXIS: 144 DEGREES
TR MAX PG: 31 MMHG
TR PEAK VELOCITY: 2.8 M/S
TRICUSPID ANNULAR PLANE SYSTOLIC EXCURSION: 1.8 CM
TRICUSPID VALVE PEAK REGURGITATION VELOCITY: 2.76 M/S
VENTRICULAR RATE: 93 BPM

## 2025-05-06 PROCEDURE — C1760 CLOSURE DEV, VASC: HCPCS | Performed by: THORACIC SURGERY (CARDIOTHORACIC VASCULAR SURGERY)

## 2025-05-06 PROCEDURE — C1769 GUIDE WIRE: HCPCS | Performed by: THORACIC SURGERY (CARDIOTHORACIC VASCULAR SURGERY)

## 2025-05-06 PROCEDURE — NC001 PR NO CHARGE: Performed by: PHYSICIAN ASSISTANT

## 2025-05-06 PROCEDURE — C1751 CATH, INF, PER/CENT/MIDLINE: HCPCS | Performed by: THORACIC SURGERY (CARDIOTHORACIC VASCULAR SURGERY)

## 2025-05-06 PROCEDURE — 93355 ECHO TRANSESOPHAGEAL (TEE): CPT

## 2025-05-06 PROCEDURE — 93005 ELECTROCARDIOGRAM TRACING: CPT

## 2025-05-06 PROCEDURE — 85347 COAGULATION TIME ACTIVATED: CPT

## 2025-05-06 PROCEDURE — 71045 X-RAY EXAM CHEST 1 VIEW: CPT

## 2025-05-06 PROCEDURE — 76377 3D RENDER W/INTRP POSTPROCES: CPT

## 2025-05-06 PROCEDURE — 82803 BLOOD GASES ANY COMBINATION: CPT

## 2025-05-06 PROCEDURE — 84295 ASSAY OF SERUM SODIUM: CPT

## 2025-05-06 PROCEDURE — 02RF38Z REPLACEMENT OF AORTIC VALVE WITH ZOOPLASTIC TISSUE, PERCUTANEOUS APPROACH: ICD-10-PCS | Performed by: THORACIC SURGERY (CARDIOTHORACIC VASCULAR SURGERY)

## 2025-05-06 PROCEDURE — 93306 TTE W/DOPPLER COMPLETE: CPT | Performed by: INTERNAL MEDICINE

## 2025-05-06 PROCEDURE — C1894 INTRO/SHEATH, NON-LASER: HCPCS | Performed by: THORACIC SURGERY (CARDIOTHORACIC VASCULAR SURGERY)

## 2025-05-06 PROCEDURE — 85014 HEMATOCRIT: CPT | Performed by: PHYSICIAN ASSISTANT

## 2025-05-06 PROCEDURE — 85018 HEMOGLOBIN: CPT | Performed by: PHYSICIAN ASSISTANT

## 2025-05-06 PROCEDURE — 02HV33Z INSERTION OF INFUSION DEVICE INTO SUPERIOR VENA CAVA, PERCUTANEOUS APPROACH: ICD-10-PCS | Performed by: ANESTHESIOLOGY

## 2025-05-06 PROCEDURE — 82947 ASSAY GLUCOSE BLOOD QUANT: CPT

## 2025-05-06 PROCEDURE — 33361 REPLACE AORTIC VALVE PERQ: CPT | Performed by: THORACIC SURGERY (CARDIOTHORACIC VASCULAR SURGERY)

## 2025-05-06 PROCEDURE — 85049 AUTOMATED PLATELET COUNT: CPT | Performed by: PHYSICIAN ASSISTANT

## 2025-05-06 PROCEDURE — 80048 BASIC METABOLIC PNL TOTAL CA: CPT | Performed by: PHYSICIAN ASSISTANT

## 2025-05-06 PROCEDURE — 99223 1ST HOSP IP/OBS HIGH 75: CPT | Performed by: INTERNAL MEDICINE

## 2025-05-06 PROCEDURE — 85014 HEMATOCRIT: CPT

## 2025-05-06 PROCEDURE — 86923 COMPATIBILITY TEST ELECTRIC: CPT

## 2025-05-06 PROCEDURE — 99222 1ST HOSP IP/OBS MODERATE 55: CPT | Performed by: FAMILY MEDICINE

## 2025-05-06 PROCEDURE — 82948 REAGENT STRIP/BLOOD GLUCOSE: CPT

## 2025-05-06 PROCEDURE — 82330 ASSAY OF CALCIUM: CPT

## 2025-05-06 PROCEDURE — 84132 ASSAY OF SERUM POTASSIUM: CPT

## 2025-05-06 PROCEDURE — 93306 TTE W/DOPPLER COMPLETE: CPT

## 2025-05-06 PROCEDURE — 93010 ELECTROCARDIOGRAM REPORT: CPT | Performed by: INTERNAL MEDICINE

## 2025-05-06 DEVICE — PERCLOSE™ PROSTYLE™ SUTURE-MEDIATED CLOSURE AND REPAIR SYSTEM
Type: IMPLANTABLE DEVICE | Site: GROIN | Status: FUNCTIONAL
Brand: PERCLOSE™ PROSTYLE™

## 2025-05-06 DEVICE — SAPIEN 3 ULTRA RESILIA TRANSCATHETER HEART VALVE, 26MM
Type: IMPLANTABLE DEVICE | Site: HEART | Status: FUNCTIONAL
Brand: SAPIEN 3 ULTRA RESILIA

## 2025-05-06 RX ORDER — FENTANYL CITRATE 50 UG/ML
INJECTION, SOLUTION INTRAMUSCULAR; INTRAVENOUS AS NEEDED
Status: DISCONTINUED | OUTPATIENT
Start: 2025-05-06 | End: 2025-05-06

## 2025-05-06 RX ORDER — LIDOCAINE HYDROCHLORIDE 10 MG/ML
INJECTION, SOLUTION INFILTRATION; PERINEURAL AS NEEDED
Status: DISCONTINUED | OUTPATIENT
Start: 2025-05-06 | End: 2025-05-06

## 2025-05-06 RX ORDER — LIDOCAINE HYDROCHLORIDE 10 MG/ML
1 INJECTION, SOLUTION EPIDURAL; INFILTRATION; INTRACAUDAL; PERINEURAL ONCE
Status: COMPLETED | OUTPATIENT
Start: 2025-05-06 | End: 2025-05-06

## 2025-05-06 RX ORDER — ROCURONIUM BROMIDE 10 MG/ML
INJECTION, SOLUTION INTRAVENOUS AS NEEDED
Status: DISCONTINUED | OUTPATIENT
Start: 2025-05-06 | End: 2025-05-06

## 2025-05-06 RX ORDER — CALCIUM GLUCONATE 20 MG/ML
2 INJECTION, SOLUTION INTRAVENOUS ONCE AS NEEDED
Status: DISCONTINUED | OUTPATIENT
Start: 2025-05-06 | End: 2025-05-08 | Stop reason: HOSPADM

## 2025-05-06 RX ORDER — ACETAMINOPHEN 325 MG/1
650 TABLET ORAL EVERY 4 HOURS PRN
Status: DISCONTINUED | OUTPATIENT
Start: 2025-05-06 | End: 2025-05-07

## 2025-05-06 RX ORDER — SODIUM CHLORIDE, SODIUM LACTATE, POTASSIUM CHLORIDE, CALCIUM CHLORIDE 600; 310; 30; 20 MG/100ML; MG/100ML; MG/100ML; MG/100ML
INJECTION, SOLUTION INTRAVENOUS CONTINUOUS PRN
Status: DISCONTINUED | OUTPATIENT
Start: 2025-05-06 | End: 2025-05-06

## 2025-05-06 RX ORDER — CEFAZOLIN SODIUM 2 G/50ML
2000 SOLUTION INTRAVENOUS EVERY 8 HOURS
Status: COMPLETED | OUTPATIENT
Start: 2025-05-06 | End: 2025-05-07

## 2025-05-06 RX ORDER — CLOPIDOGREL BISULFATE 75 MG/1
75 TABLET ORAL DAILY
Status: DISCONTINUED | OUTPATIENT
Start: 2025-05-06 | End: 2025-05-08 | Stop reason: HOSPADM

## 2025-05-06 RX ORDER — ONDANSETRON 2 MG/ML
INJECTION INTRAMUSCULAR; INTRAVENOUS AS NEEDED
Status: DISCONTINUED | OUTPATIENT
Start: 2025-05-06 | End: 2025-05-06

## 2025-05-06 RX ORDER — LABETALOL HYDROCHLORIDE 5 MG/ML
10 INJECTION, SOLUTION INTRAVENOUS
Status: DISCONTINUED | OUTPATIENT
Start: 2025-05-06 | End: 2025-05-06

## 2025-05-06 RX ORDER — HYDRALAZINE HYDROCHLORIDE 20 MG/ML
10 INJECTION INTRAMUSCULAR; INTRAVENOUS EVERY 6 HOURS PRN
Status: DISCONTINUED | OUTPATIENT
Start: 2025-05-06 | End: 2025-05-08 | Stop reason: HOSPADM

## 2025-05-06 RX ORDER — CHLORHEXIDINE GLUCONATE ORAL RINSE 1.2 MG/ML
15 SOLUTION DENTAL ONCE
Status: COMPLETED | OUTPATIENT
Start: 2025-05-06 | End: 2025-05-06

## 2025-05-06 RX ORDER — METOPROLOL SUCCINATE 25 MG/1
25 TABLET, EXTENDED RELEASE ORAL DAILY
Status: DISCONTINUED | OUTPATIENT
Start: 2025-05-06 | End: 2025-05-06

## 2025-05-06 RX ORDER — CHLORHEXIDINE GLUCONATE ORAL RINSE 1.2 MG/ML
15 SOLUTION DENTAL 2 TIMES DAILY
Status: DISCONTINUED | OUTPATIENT
Start: 2025-05-06 | End: 2025-05-08 | Stop reason: HOSPADM

## 2025-05-06 RX ORDER — POLYETHYLENE GLYCOL 3350 17 G/17G
17 POWDER, FOR SOLUTION ORAL DAILY
Status: DISCONTINUED | OUTPATIENT
Start: 2025-05-06 | End: 2025-05-08 | Stop reason: HOSPADM

## 2025-05-06 RX ORDER — PHENYLEPHRINE HCL IN 0.9% NACL 1 MG/10 ML
SYRINGE (ML) INTRAVENOUS AS NEEDED
Status: DISCONTINUED | OUTPATIENT
Start: 2025-05-06 | End: 2025-05-06

## 2025-05-06 RX ORDER — SODIUM CHLORIDE 9 MG/ML
INJECTION, SOLUTION INTRAVENOUS CONTINUOUS PRN
Status: DISCONTINUED | OUTPATIENT
Start: 2025-05-06 | End: 2025-05-06

## 2025-05-06 RX ORDER — HEPARIN SODIUM 1000 [USP'U]/ML
400 INJECTION, SOLUTION INTRAVENOUS; SUBCUTANEOUS ONCE
Status: DISCONTINUED | OUTPATIENT
Start: 2025-05-06 | End: 2025-05-06

## 2025-05-06 RX ORDER — INSULIN LISPRO 100 [IU]/ML
1-5 INJECTION, SOLUTION INTRAVENOUS; SUBCUTANEOUS
Status: DISCONTINUED | OUTPATIENT
Start: 2025-05-06 | End: 2025-05-08 | Stop reason: HOSPADM

## 2025-05-06 RX ORDER — LIDOCAINE HYDROCHLORIDE 10 MG/ML
INJECTION, SOLUTION EPIDURAL; INFILTRATION; INTRACAUDAL; PERINEURAL
Status: COMPLETED
Start: 2025-05-06 | End: 2025-05-06

## 2025-05-06 RX ORDER — PROMETHAZINE HYDROCHLORIDE 25 MG/ML
12.5 INJECTION, SOLUTION INTRAMUSCULAR; INTRAVENOUS ONCE AS NEEDED
Status: DISCONTINUED | OUTPATIENT
Start: 2025-05-06 | End: 2025-05-06

## 2025-05-06 RX ORDER — ASPIRIN 81 MG/1
81 TABLET, CHEWABLE ORAL DAILY
Status: DISCONTINUED | OUTPATIENT
Start: 2025-05-06 | End: 2025-05-08 | Stop reason: HOSPADM

## 2025-05-06 RX ORDER — CEFAZOLIN SODIUM 2 G/50ML
2000 SOLUTION INTRAVENOUS ONCE
Status: COMPLETED | OUTPATIENT
Start: 2025-05-06 | End: 2025-05-06

## 2025-05-06 RX ORDER — ONDANSETRON 2 MG/ML
4 INJECTION INTRAMUSCULAR; INTRAVENOUS ONCE AS NEEDED
Status: DISCONTINUED | OUTPATIENT
Start: 2025-05-06 | End: 2025-05-06

## 2025-05-06 RX ORDER — MECLIZINE HYDROCHLORIDE 25 MG/1
25 TABLET ORAL EVERY 8 HOURS PRN
Status: DISCONTINUED | OUTPATIENT
Start: 2025-05-06 | End: 2025-05-07

## 2025-05-06 RX ORDER — BISACODYL 10 MG
10 SUPPOSITORY, RECTAL RECTAL DAILY PRN
Status: DISCONTINUED | OUTPATIENT
Start: 2025-05-06 | End: 2025-05-08 | Stop reason: HOSPADM

## 2025-05-06 RX ORDER — METOPROLOL SUCCINATE 25 MG/1
25 TABLET, EXTENDED RELEASE ORAL DAILY
Status: DISCONTINUED | OUTPATIENT
Start: 2025-05-06 | End: 2025-05-08

## 2025-05-06 RX ORDER — HYDROMORPHONE HCL/PF 1 MG/ML
0.5 SYRINGE (ML) INJECTION
Status: DISCONTINUED | OUTPATIENT
Start: 2025-05-06 | End: 2025-05-06

## 2025-05-06 RX ORDER — POTASSIUM CHLORIDE 1500 MG/1
20 TABLET, EXTENDED RELEASE ORAL ONCE
Status: COMPLETED | OUTPATIENT
Start: 2025-05-06 | End: 2025-05-06

## 2025-05-06 RX ORDER — PROTAMINE SULFATE 10 MG/ML
INJECTION, SOLUTION INTRAVENOUS AS NEEDED
Status: DISCONTINUED | OUTPATIENT
Start: 2025-05-06 | End: 2025-05-06

## 2025-05-06 RX ORDER — PROPOFOL 10 MG/ML
INJECTION, EMULSION INTRAVENOUS AS NEEDED
Status: DISCONTINUED | OUTPATIENT
Start: 2025-05-06 | End: 2025-05-06

## 2025-05-06 RX ORDER — HEPARIN SODIUM 5000 [USP'U]/ML
5000 INJECTION, SOLUTION INTRAVENOUS; SUBCUTANEOUS EVERY 8 HOURS SCHEDULED
Status: DISCONTINUED | OUTPATIENT
Start: 2025-05-07 | End: 2025-05-07

## 2025-05-06 RX ORDER — TAMSULOSIN HYDROCHLORIDE 0.4 MG/1
0.4 CAPSULE ORAL
Status: DISCONTINUED | OUTPATIENT
Start: 2025-05-06 | End: 2025-05-08 | Stop reason: HOSPADM

## 2025-05-06 RX ORDER — AMLODIPINE BESYLATE 10 MG/1
10 TABLET ORAL DAILY
Status: DISCONTINUED | OUTPATIENT
Start: 2025-05-06 | End: 2025-05-08 | Stop reason: HOSPADM

## 2025-05-06 RX ORDER — ALBUMIN HUMAN 50 G/1000ML
SOLUTION INTRAVENOUS CONTINUOUS PRN
Status: DISCONTINUED | OUTPATIENT
Start: 2025-05-06 | End: 2025-05-06

## 2025-05-06 RX ORDER — ONDANSETRON 2 MG/ML
4 INJECTION INTRAMUSCULAR; INTRAVENOUS EVERY 6 HOURS PRN
Status: DISCONTINUED | OUTPATIENT
Start: 2025-05-06 | End: 2025-05-08 | Stop reason: HOSPADM

## 2025-05-06 RX ORDER — ALBUTEROL SULFATE 0.83 MG/ML
2.5 SOLUTION RESPIRATORY (INHALATION) ONCE AS NEEDED
Status: DISCONTINUED | OUTPATIENT
Start: 2025-05-06 | End: 2025-05-06

## 2025-05-06 RX ORDER — METOCLOPRAMIDE HYDROCHLORIDE 5 MG/ML
10 INJECTION INTRAMUSCULAR; INTRAVENOUS ONCE AS NEEDED
Status: DISCONTINUED | OUTPATIENT
Start: 2025-05-06 | End: 2025-05-06

## 2025-05-06 RX ORDER — HEPARIN SODIUM 1000 [USP'U]/ML
INJECTION, SOLUTION INTRAVENOUS; SUBCUTANEOUS AS NEEDED
Status: DISCONTINUED | OUTPATIENT
Start: 2025-05-06 | End: 2025-05-06

## 2025-05-06 RX ORDER — PANTOPRAZOLE SODIUM 40 MG/1
40 TABLET, DELAYED RELEASE ORAL DAILY
Status: DISCONTINUED | OUTPATIENT
Start: 2025-05-06 | End: 2025-05-08 | Stop reason: HOSPADM

## 2025-05-06 RX ORDER — HYDROMORPHONE HCL IN WATER/PF 6 MG/30 ML
0.2 PATIENT CONTROLLED ANALGESIA SYRINGE INTRAVENOUS
Status: DISCONTINUED | OUTPATIENT
Start: 2025-05-06 | End: 2025-05-06

## 2025-05-06 RX ORDER — ATORVASTATIN CALCIUM 80 MG/1
80 TABLET, FILM COATED ORAL DAILY
Status: DISCONTINUED | OUTPATIENT
Start: 2025-05-06 | End: 2025-05-08 | Stop reason: HOSPADM

## 2025-05-06 RX ORDER — FENTANYL CITRATE/PF 50 MCG/ML
25 SYRINGE (ML) INJECTION
Status: DISCONTINUED | OUTPATIENT
Start: 2025-05-06 | End: 2025-05-06

## 2025-05-06 RX ORDER — FUROSEMIDE 10 MG/ML
40 INJECTION INTRAMUSCULAR; INTRAVENOUS ONCE
Status: COMPLETED | OUTPATIENT
Start: 2025-05-06 | End: 2025-05-06

## 2025-05-06 RX ORDER — HYDRALAZINE HYDROCHLORIDE 20 MG/ML
5 INJECTION INTRAMUSCULAR; INTRAVENOUS
Status: DISCONTINUED | OUTPATIENT
Start: 2025-05-06 | End: 2025-05-06

## 2025-05-06 RX ADMIN — CEFAZOLIN SODIUM 2000 MG: 2 SOLUTION INTRAVENOUS at 07:31

## 2025-05-06 RX ADMIN — FUROSEMIDE 40 MG: 10 INJECTION, SOLUTION INTRAMUSCULAR; INTRAVENOUS at 13:06

## 2025-05-06 RX ADMIN — METOPROLOL SUCCINATE 25 MG: 25 TABLET, EXTENDED RELEASE ORAL at 14:39

## 2025-05-06 RX ADMIN — SODIUM CHLORIDE, SODIUM LACTATE, POTASSIUM CHLORIDE, AND CALCIUM CHLORIDE: .6; .31; .03; .02 INJECTION, SOLUTION INTRAVENOUS at 06:55

## 2025-05-06 RX ADMIN — CHLORHEXIDINE GLUCONATE 15 ML: 1.2 SOLUTION ORAL at 17:41

## 2025-05-06 RX ADMIN — NICARDIPINE HYDROCHLORIDE 5 MG/HR: 2.5 INJECTION, SOLUTION INTRAVENOUS at 08:41

## 2025-05-06 RX ADMIN — Medication 100 MCG: at 08:11

## 2025-05-06 RX ADMIN — FENTANYL CITRATE 50 MCG: 50 INJECTION INTRAMUSCULAR; INTRAVENOUS at 07:32

## 2025-05-06 RX ADMIN — CHLORHEXIDINE GLUCONATE 15 ML: 1.2 SOLUTION ORAL at 05:56

## 2025-05-06 RX ADMIN — Medication 50 MCG: at 08:30

## 2025-05-06 RX ADMIN — NICARDIPINE HYDROCHLORIDE 300 MCG: 2.5 INJECTION, SOLUTION INTRAVENOUS at 08:40

## 2025-05-06 RX ADMIN — SUGAMMADEX 200 MG: 100 INJECTION, SOLUTION INTRAVENOUS at 08:38

## 2025-05-06 RX ADMIN — MUPIROCIN 1 APPLICATION: 20 OINTMENT TOPICAL at 05:56

## 2025-05-06 RX ADMIN — ATORVASTATIN CALCIUM 80 MG: 80 TABLET, FILM COATED ORAL at 10:31

## 2025-05-06 RX ADMIN — PROPOFOL 70 MG: 10 INJECTION, EMULSION INTRAVENOUS at 07:35

## 2025-05-06 RX ADMIN — POTASSIUM CHLORIDE 20 MEQ: 1500 TABLET, EXTENDED RELEASE ORAL at 11:23

## 2025-05-06 RX ADMIN — ROCURONIUM BROMIDE 50 MG: 10 INJECTION, SOLUTION INTRAVENOUS at 07:36

## 2025-05-06 RX ADMIN — CEFAZOLIN SODIUM 2000 MG: 2 SOLUTION INTRAVENOUS at 14:39

## 2025-05-06 RX ADMIN — FENTANYL CITRATE 50 MCG: 50 INJECTION INTRAMUSCULAR; INTRAVENOUS at 07:35

## 2025-05-06 RX ADMIN — LIDOCAINE HYDROCHLORIDE 1 ML: 10 INJECTION, SOLUTION EPIDURAL; INFILTRATION; INTRACAUDAL at 14:00

## 2025-05-06 RX ADMIN — CLOPIDOGREL BISULFATE 75 MG: 75 TABLET, FILM COATED ORAL at 10:31

## 2025-05-06 RX ADMIN — SODIUM CHLORIDE: 9 INJECTION, SOLUTION INTRAVENOUS at 07:39

## 2025-05-06 RX ADMIN — ALBUMIN (HUMAN): 12.5 INJECTION, SOLUTION INTRAVENOUS at 08:14

## 2025-05-06 RX ADMIN — TAMSULOSIN HYDROCHLORIDE 0.4 MG: 0.4 CAPSULE ORAL at 16:24

## 2025-05-06 RX ADMIN — ONDANSETRON 4 MG: 2 INJECTION, SOLUTION INTRAMUSCULAR; INTRAVENOUS at 07:35

## 2025-05-06 RX ADMIN — LIDOCAINE HYDROCHLORIDE 1 ML: 10 INJECTION, SOLUTION EPIDURAL; INFILTRATION; INTRACAUDAL; PERINEURAL at 14:00

## 2025-05-06 RX ADMIN — CEFAZOLIN SODIUM 2000 MG: 2 SOLUTION INTRAVENOUS at 22:09

## 2025-05-06 RX ADMIN — HYDRALAZINE HYDROCHLORIDE 10 MG: 20 INJECTION, SOLUTION INTRAMUSCULAR; INTRAVENOUS at 19:42

## 2025-05-06 RX ADMIN — PROTAMINE SULFATE 60 MG: 10 INJECTION, SOLUTION INTRAVENOUS at 08:35

## 2025-05-06 RX ADMIN — AMLODIPINE BESYLATE 10 MG: 10 TABLET ORAL at 11:28

## 2025-05-06 RX ADMIN — CHLORHEXIDINE GLUCONATE 15 ML: 1.2 SOLUTION ORAL at 10:34

## 2025-05-06 RX ADMIN — MUPIROCIN 1 APPLICATION: 20 OINTMENT TOPICAL at 22:08

## 2025-05-06 RX ADMIN — ASPIRIN 81 MG CHEWABLE TABLET 81 MG: 81 TABLET CHEWABLE at 10:31

## 2025-05-06 RX ADMIN — PANTOPRAZOLE SODIUM 40 MG: 40 TABLET, DELAYED RELEASE ORAL at 10:31

## 2025-05-06 RX ADMIN — LIDOCAINE HYDROCHLORIDE 50 MG: 10 INJECTION, SOLUTION EPIDURAL; INFILTRATION; INTRACAUDAL; PERINEURAL at 07:35

## 2025-05-06 RX ADMIN — HEPARIN SODIUM 10000 UNITS: 1000 INJECTION, SOLUTION INTRAVENOUS; SUBCUTANEOUS at 08:19

## 2025-05-06 NOTE — ANESTHESIA POSTPROCEDURE EVALUATION
Post-Op Assessment Note    Last Filed PACU Vitals:  Vitals Value Taken Time   Temp 97.3 °F (36.3 °C) 05/06/25 0930   Pulse 101 05/06/25 1008   /55 05/06/25 1000   Resp 18 05/06/25 1008   SpO2 93 % 05/06/25 1008   Vitals shown include unfiled device data.    Modified Hanane:     Vitals Value Taken Time   Activity 2 05/06/25 0945   Respiration 2 05/06/25 0945   Circulation 2 05/06/25 0945   Consciousness 2 05/06/25 0945   Oxygen Saturation 2 05/06/25 0945     Modified Hanane Score: 10

## 2025-05-06 NOTE — ASSESSMENT & PLAN NOTE
Lab Results   Component Value Date    EGFR 44 05/06/2025    EGFR 47 05/02/2025    EGFR 57 04/26/2025    CREATININE 1.40 (H) 05/06/2025    CREATININE 1.33 (H) 05/02/2025    CREATININE 1.13 04/26/2025   Appears close to baseline   Usually around 1.4

## 2025-05-06 NOTE — PROCEDURES
05/06/25    Procedure: RIJ Purstring Suture    RIJ TLC site oozing, not alleviated w/ manual pressure or pressure + Avetine. Patient already had ASA/Plavix dosing. Slow ooze noted at bedside, approximately 1.5cc Lidocaine w/ epinephrine injected around the TLC exit site w/ numbness achieved. Purstring suture x1 placed w/ improvement in oozing. RN at bedside for encounter & for dressing reapplication. Christian Jones tolerated the procedure well.    SIGNATURE: Jen Solomon PA-C  DATE: May 6, 2025  TIME: 2:12 PM

## 2025-05-06 NOTE — ASSESSMENT & PLAN NOTE
Follows with urology as outpatient, last OV 9/16/24  Reports nocturia 3x night  On alfuzosin   Montes in place post op

## 2025-05-06 NOTE — CONSULTS
Consultation - Electrophysiology - Cardiology  Christian Jones 89 y.o. male MRN: [id number]  [facility] Encounter: [id number]      Inpatient consult to Electrophysiology  Consult performed by: Michael Kumar PA-C  Consult ordered by: dEith Goodrich PA-C          History of Present Illness   Physician Requesting Consult: DIMAS Caballero MD  Reason for Consult / Principal Problem: LBBB post TAVR     Assessment & Plan   Assessment & Plan  New onset left bundle branch block (LBBB)  New after TAVR with prior QRS duration 80 ms with LBBB 140 MS   3 hour EKG shows persistently long, albeit shorter  ms   On metoprolol in the outpatient setting for NSVT and CAD   Restart metoprolol - for CAD, PVC/NSVT  Having significant ectopy on tele post TAVR   Npo at midnight for consideration for pacer  Monitor rhythm over 24 hours   Aortic stenosis  S/p TAVR w/ 26 mm Marinelli AIMEE 3 Ultra Resilia   CKD (chronic kidney disease) stage 3, GFR 30-59 ml/min ([facility])  Lab Results   Component Value Date    EGFR 44 05/06/2025    EGFR 47 05/02/2025    EGFR 57 04/26/2025    CREATININE 1.40 (H) 05/06/2025    CREATININE 1.33 (H) 05/02/2025    CREATININE 1.13 04/26/2025   Appears close to baseline   Usually around 1.4   NSVT (nonsustained ventricular tachycardia) (HCC)  Noted on prior holter monitor   Does have frequent PVCs on monitor here   Last echo with preserved EF   He is on metoprolol 25 mg daily for control - currently on hold for LBBB   Coronary artery disease involving native coronary artery  Noted on pre TAVR cath with severe 2V CAD   LAD 65% prox 70% mid long and 80% distal lesion   LCX mild diffuse disease   Multifocal RCA dz distal 65% and mid RPDA 90%   Statin, ASA, BB plavix        HPI: Christian Jones is a 89 y.o. year old male with past medical history as mentioned above who presented to [facility] to undergo elective TAVR. His pre TAVR workup was revealing for severe 2V CAD which is being medically managed.  He did have holter monitor done many years ago with periods of NSVT. His last echo had a normal EF. He underwent TAVR 5/6/25 with 26 mm Marinelli AIMEE valve. Prior to TAVR was noted to have a QRS duration of 80 ms with new LBBB after TAVR with QRS duration 140 ms. 3 hours EKG was done post operatively showing persistently wide QRS at 130 ms. He was also noted to have frequent ectopy on his telemetry in the immediate post op period thus making a case for restarting metoprolol. Discussed plan of restarting metoprolol and npo after midnight with primary service.       Historical Information   Past Medical History:   Diagnosis Date    Aortic valve, bicuspid 12/1/2024    Arthritis 1/2000    Basal cell carcinoma     Head    Depression 1/1990    Ear problems 1980    GERD (gastroesophageal reflux disease) 1/1990    Heart murmur long standing    Hyperlipemia     Hypertension     Positive fecal occult blood test     Psoriasis     Rectal bleeding     Tinnitus 1980    continuing    Visual impairment 1/1950    corrected     Past Surgical History:   Procedure Laterality Date    BASAL CELL CARCINOMA EXCISION      CARDIAC CATHETERIZATION N/A 4/15/2025    Procedure: Cardiac Coronary Angiogram;  Surgeon: Marlene Carney DO;  Location: [facility];  Service: Cardiology    CARDIAC CATHETERIZATION N/A 4/25/2025    Procedure: Cardiac PCI;  Surgeon: Marlene Carney DO;  Location: [facility];  Service: Cardiology    CARDIAC CATHETERIZATION N/A 4/25/2025    Procedure: Cardiac PCI Stent;  Surgeon: Marlene Carney DO;  Location: [facility];  Service: Cardiology    COLONOSCOPY      COLONOSCOPY  03/28/2019    HERNIA REPAIR  2012    Inguinal    LIPOMA RESECTION      Scalp    SIGMOIDOSCOPY       Social History     Substance and Sexual Activity   Alcohol Use Yes    Alcohol/week: 13.0 standard drinks of alcohol    Types: 7 Glasses of wine, 6 Cans of beer per week    Comment: drink with meals     Social History      Substance and Sexual Activity   Drug Use Never     Social History     Tobacco Use   Smoking Status Former    Current packs/day: 0.00    Average packs/day: 0.3 packs/day for 15.0 years (3.8 ttl pk-yrs)    Types: Pipe, Cigarettes    Start date:     Quit date: 1985    Years since quittin.9   Smokeless Tobacco Never     Family History: Family history non-contributory    Meds/Allergies   Hospital Medications:   Current Facility-Administered Medications:     acetaminophen (TYLENOL) rectal suppository 650 mg, Q4H PRN    acetaminophen (TYLENOL) tablet 650 mg, Q4H PRN    amLODIPine (NORVASC) tablet 10 mg, Daily    aspirin chewable tablet 81 mg, Daily    atorvastatin (LIPITOR) tablet 80 mg, Daily    bisacodyl (DULCOLAX) rectal suppository 10 mg, Daily PRN    calcium gluconate 2 g in sodium chloride 0.9% 100 mL (premix), Once PRN    ceFAZolin (ANCEF) IVPB (premix in dextrose) 2,000 mg 50 mL, Q8H    chlorhexidine (PERIDEX) 0.12 % oral rinse 15 mL, BID    clopidogrel (PLAVIX) tablet 75 mg, Daily    [START ON 2025] heparin (porcine) subcutaneous injection 5,000 Units, Q8H ELSY **AND** [CANCELED] Platelet count, Once    hydrALAZINE (APRESOLINE) injection 10 mg, Q6H PRN    insulin lispro (HumALOG/ADMELOG) 100 units/mL subcutaneous injection 1-5 Units, TID AC **AND** Fingerstick Glucose (POCT), TID AC    insulin lispro (HumALOG/ADMELOG) 100 units/mL subcutaneous injection 1-5 Units, HS    lidocaine (PF) (XYLOCAINE-MPF) 1 % injection **ADS Override Pull**,     lidocaine (PF) (XYLOCAINE-MPF) 1 % injection 1 mL, Once    meclizine (ANTIVERT) tablet 25 mg, Q8H PRN    mupirocin (BACTROBAN) 2 % nasal ointment 1 Application, Q12H ELSY    niCARdipine (CARDENE) 25 mg (STANDARD CONCENTRATION) in sodium chloride 0.9% 250 mL, Titrated, Last Rate: 2.5 mg/hr (25 1330)    ondansetron (ZOFRAN) injection 4 mg, Q6H PRN    pantoprazole (PROTONIX) EC tablet 40 mg, Daily    polyethylene glycol (MIRALAX) packet 17 g, Daily     "tamsulosin (FLOMAX) capsule 0.4 mg, Daily With Dinner  Home Medications:   Medications Prior to Admission:     acetaminophen (TYLENOL) 650 mg CR tablet    alfuzosin (UROXATRAL) 10 mg 24 hr tablet    amLODIPine (NORVASC) 10 mg tablet    aspirin 81 mg chewable tablet    atorvastatin (LIPITOR) 80 mg tablet    clopidogrel (PLAVIX) 75 mg tablet    metoprolol succinate (TOPROL-XL) 25 mg 24 hr tablet    Multiple Vitamin (multivitamin) tablet    pantoprazole (PROTONIX) 40 mg tablet    atorvastatin (LIPITOR) 80 mg tablet    clopidogrel (PLAVIX) 75 mg tablet    meclizine (ANTIVERT) 25 mg tablet    mupirocin (BACTROBAN) 2 % ointment    No Known Allergies    Objective   Vitals: Blood pressure 135/64, pulse 101, temperature 98.2 °F (36.8 °C), temperature source Bladder, resp. rate 14, height 5' 5\" (1.651 m), weight 68.3 kg (150 lb 9.6 oz), SpO2 94%.  Orthostatic Blood Pressures      Flowsheet Row Most Recent Value   Blood Pressure 135/64 filed at 05/06/2025 1330   Patient Position - Orthostatic VS Lying filed at 05/06/2025 1300              Intake/Output Summary (Last 24 hours) at 5/6/2025 1358  Last data filed at 5/6/2025 1317  Gross per 24 hour   Intake 1199.25 ml   Output 690 ml   Net 509.25 ml       Invasive Devices       Central Venous Catheter Line  Duration             CVC Central Lines 05/06/25 <1 day              Peripheral Intravenous Line  Duration             Peripheral IV 05/06/25 Left Forearm <1 day    Peripheral IV 05/06/25 Right Hand <1 day              Arterial Line  Duration             Arterial Line 05/06/25 Left Radial <1 day              Drain  Duration             Urethral Catheter Non-latex;Temperature probe 16 Fr. <1 day                    Review of Systems:  Review of Systems   Constitutional: Negative for fever and malaise/fatigue.   Cardiovascular:  Negative for chest pain, dyspnea on exertion, near-syncope, orthopnea, palpitations and syncope.   Respiratory:  Negative for shortness of breath.      ROS " as noted above, otherwise 12 point review of systems was performed and is negative.     Physical Exam:   Physical Exam  Vitals and nursing note reviewed.   Constitutional:       General: He is not in acute distress.  HENT:      Head: Normocephalic and atraumatic.   Cardiovascular:      Rate and Rhythm: Normal rate. Rhythm irregular.   Pulmonary:      Effort: Pulmonary effort is normal.      Breath sounds: Normal breath sounds.   Musculoskeletal:      Right lower leg: No edema.      Left lower leg: No edema.   Skin:     General: Skin is warm and dry.      Coloration: Skin is not jaundiced.   Neurological:      Mental Status: He is alert.         Lab Results: I have personally reviewed pertinent lab results.    Results from last 7 days   Lab Units 05/06/25  0857 05/06/25  0845 05/06/25 0825 05/06/25 0748 05/02/25  1108   WBC Thousand/uL  --   --   --   --  7.26   HEMOGLOBIN g/dL 11.2*  --   --   --  12.1   I STAT HEMOGLOBIN g/dl  --  9.9* 9.9*   < >  --    HEMATOCRIT % 35.0*  --   --   --  38.8   HEMATOCRIT, ISTAT %  --  29* 29*   < >  --    PLATELETS Thousands/uL 177  --   --   --  243    < > = values in this interval not displayed.     Results from last 7 days   Lab Units 05/06/25  0857 05/06/25  0845 05/06/25 0825 05/06/25 0748 05/02/25  1108   POTASSIUM mmol/L 3.9  --   --   --  4.3   CHLORIDE mmol/L 111*  --   --   --  108   CO2 mmol/L 21  --   --   --  22   CO2, I-STAT mmol/L  --  21 20*   < >  --    BUN mg/dL 21  --   --   --  22   CREATININE mg/dL 1.40*  --   --   --  1.33*   GLUCOSE, ISTAT mg/dl  --  107 109   < >  --    CALCIUM mg/dL 8.0*  --   --   --  8.9    < > = values in this interval not displayed.     Results from last 7 days   Lab Units 05/02/25  1108   INR  1.07           Imaging: Results Review Statement: No pertinent imaging studies reviewed.  ECHO: Results for orders placed during the hospital encounter of 09/03/21    Echo complete with contrast if indicated    Narrative  [facility]  [address] [address]  [phone]    Transthoracic Echocardiogram  2D, M-mode, Doppler, and Color Doppler    Study date:  03-Sep-2021    Patient: FAMILIA LUCIO  MR number: [id number]  Account number: [id number]  : 1935  Age: 85 years  Gender: Male  Status: Outpatient  Location: [address] [place] [address]  Height: 64 in  Weight: 163.7 lb  BP: 138/ 85 mmHg    Indications: Murmur    Diagnoses: R01.1 - Cardiac murmur, unspecified    Sonographer:  Olegario Valencia RDCS  Referring Physician:  Marvin Meadows MD  Group:  [facility]  Interpreting Physician:  Jeffrey Beltrán MD    SUMMARY    LEFT VENTRICLE:  Systolic function was normal. Ejection fraction was estimated to be 55 %.  There were no regional wall motion abnormalities.  Doppler parameters were consistent with abnormal left ventricular relaxation (grade 1 diastolic dysfunction).    MITRAL VALVE:  There was mild regurgitation.    AORTIC VALVE:  Transaortic velocity was increased due to valvular stenosis.  There was mild stenosis.  Valve mean gradient was 10 mmHg.    TRICUSPID VALVE:  There was mild regurgitation.  Pulmonary artery systolic pressure was within the normal range.    HISTORY: PRIOR HISTORY: HTN, HLD    PROCEDURE: The study was performed in the [address] [place] [address]. This was a routine study. The transthoracic approach was used. The study included complete 2D imaging, M-mode, complete spectral Doppler, and color Doppler. The  heart rate was 95 bpm, at the start of the study. Images were obtained from the parasternal, apical, subcostal, and suprasternal notch acoustic windows. Echocardiographic views were limited due to lung interference. Image quality was  adequate.    LEFT VENTRICLE: Size was normal. Systolic function was normal. Ejection fraction was estimated to be 55 %. There were no regional wall motion abnormalities. Wall thickness was normal.  DOPPLER: Doppler parameters were consistent with  abnormal left ventricular relaxation (grade 1 diastolic dysfunction).    RIGHT VENTRICLE: The size was normal. Systolic function was normal. Wall thickness was normal.    LEFT ATRIUM: Size was normal.    RIGHT ATRIUM: Size was normal.    MITRAL VALVE: Valve structure was normal. There was normal leaflet separation. DOPPLER: The transmitral velocity was within the normal range. There was no evidence for stenosis. There was mild regurgitation.    AORTIC VALVE: The valve was trileaflet. Leaflets exhibited mildly increased thickness, mild calcification, and mildly reduced cuspal separation. DOPPLER: Transaortic velocity was increased due to valvular stenosis. There was mild stenosis.  There was no significant regurgitation.    TRICUSPID VALVE: The valve structure was normal. There was normal leaflet separation. DOPPLER: The transtricuspid velocity was within the normal range. There was no evidence for stenosis. There was mild regurgitation. Pulmonary artery  systolic pressure was within the normal range. Estimated peak PA pressure was 25 mmHg.    PULMONIC VALVE: Leaflets exhibited normal thickness, no calcification, and normal cuspal separation. DOPPLER: The transpulmonic velocity was within the normal range. There was no significant regurgitation.    PERICARDIUM: There was no pericardial effusion. The pericardium was normal in appearance.    AORTA: The root exhibited normal size.    SYSTEMIC VEINS: IVC: The inferior vena cava was normal in size. Respirophasic changes were normal.    MEASUREMENT TABLES    2D MEASUREMENTS  LVOT   (Reference normals)  Diam   20 mm   (--)    DOPPLER MEASUREMENTS  LVOT   (Reference normals)  Peak amira   84 cm/s   (--)  Mean amira   64 cm/s   (--)  VTI   18 cm   (--)  Peak gradient   3 mmHg   (--)  Mean gradient   2 mmHg   (--)  Stroke vol   56.55 ml   (--)  Aortic valve   (Reference normals)  Peak amira   211 cm/s   (--)  Mean amira   145 cm/s    (--)  VTI   40 cm   (--)  Peak gradient   18 mmHg   (--)  Mean gradient   10 mmHg   (--)  Obstr index, VTI   0.45    (--)  Valve area, VTI   1.41 cmï¾²   (--)  Area index, VTI   0.78 cmï¾²/mï¾²   (--)  Obstr index, Vmax   0.4    (--)  Valve area, Vmax   1.26 cmï¾²   (--)  Area index, Vmax   0.7 cmï¾²/mï¾²   (--)  Obstr index, Vmean   0.44    (--)  Valve area, Vmean   1.38 cmï¾²   (--)  Area index, Vmean   0.77 cmï¾²/mï¾²   (--)    SYSTEM MEASUREMENT TABLES    2D  %FS: 25.57 %  Ao Diam: 3.16 cm  Ao asc: 3.74 cm  EDV(Teich): 71.74 ml  EF(Teich): 50.89 %  ESV(Teich): 35.23 ml  HR_4Ch_Q: 81.08 BPM  IVSd: 1 cm  LA Diam: 4.09 cm  LAAs A4C: 18.66 cm2  LAESV A-L A4C: 55.68 ml  LAESV MOD A4C: 52.9 ml  LALs A4C: 5.31 cm  LVCO_4Ch_Q: 4.29 L/min  LVEDV MOD A4C: 76.67 ml  LVEF MOD A4C: 65.03 %  LVEF_4Ch_Q: 54.58 %  LVESV MOD A4C: 26.81 ml  LVIDd: 4.04 cm  LVIDs: 3.01 cm  LVLd A4C: 7.62 cm  LVLd_4Ch_Q: 7.2 cm  LVLs A4C: 5.88 cm  LVLs_4Ch_Q: 5.83 cm  LVOT Diam: 1.97 cm  LVPWd: 0.94 cm  LVSV_4Ch_Q: 52.89 ml  LVVED_4Ch_Q: 96.91 ml  LVVES_4Ch_Q: 44.02 ml  RAEDV A-L: 29.34 ml  RAEDV MOD: 30.11 ml  RALd: 5.27 cm  RVIDd: 3.27 cm  SV MOD A4C: 49.86 ml  SV(Teich): 36.51 ml    CW  AV Env.Ti: 286.87 ms  AV VTI: 39.16 cm  AV Vmax: 1.99 m/s  AV Vmean: 1.37 m/s  AV maxPG: 15.85 mmHg  AV meanP.62 mmHg  TR Vmax: 2.31 m/s  TR maxP.29 mmHg    MM  TAPSE: 1.87 cm    PW  TESSIE (VTI): 1.27 cm2  TESSIE Vmax: 1.26 cm2  AVAI (VTI): 0 cm2/m2  AVAI Vmax: 0 cm2/m2  E' Sept: 0.06 m/s  E/E' Sept: 13.55  LVOT Env.Ti: 281.64 ms  LVOT VTI: 16.33 cm  LVOT Vmax: 0.82 m/s  LVOT Vmean: 0.58 m/s  LVOT maxP.72 mmHg  LVOT meanP.51 mmHg  LVSI Dopp: 27.54 ml/m2  LVSV Dopp: 49.58 ml  MV A Dariel: 1.21 m/s  MV Dec [place]: 5.87 m/s2  MV DecT: 163.53 ms  MV E Dariel: 0.88 m/s  MV E/A Ratio: 0.73  MV PHT: 42.3 ms  MVA By PHT: 5.2 cm2    In[employer]ocietal Commission Accredited Echocardiography Laboratory    Prepared and electronically signed by    Jeffrey Beltrán MD  Signed  03-Sep-2021 15:49:00       Cardiac testing:   ECHO:   Results for orders placed during the hospital encounter of 21    Echo complete with contrast if indicated    Narrative  [place] [phone] [phone]    Transthoracic Echocardiogram  2D, M-mode, Doppler, and Color Doppler    Study date:  03-Sep-2021    Patient: FAMILIA LUCIO  MR number: [id number]  Account number: [id number]  : 1935  Age: 85 years  Gender: Male  Status: Outpatient  Location: [address] [place] [address]  Height: 64 in  Weight: 163.7 lb  BP: 138/ 85 mmHg    Indications: Murmur    Diagnoses: R01.1 - Cardiac murmur, unspecified    Sonographer:  Olegario Valencia RDCS  Referring Physician:  Marvin Meadows MD  Group:  [facility]  Interpreting Physician:  Jeffrey Beltrán MD    SUMMARY    LEFT VENTRICLE:  Systolic function was normal. Ejection fraction was estimated to be 55 %.  There were no regional wall motion abnormalities.  Doppler parameters were consistent with abnormal left ventricular relaxation (grade 1 diastolic dysfunction).    MITRAL VALVE:  There was mild regurgitation.    AORTIC VALVE:  Transaortic velocity was increased due to valvular stenosis.  There was mild stenosis.  Valve mean gradient was 10 mmHg.    TRICUSPID VALVE:  There was mild regurgitation.  Pulmonary artery systolic pressure was within the normal range.    HISTORY: PRIOR HISTORY: HTN, HLD    PROCEDURE: The study was performed in the [address] [place] [address]. This was a routine study. The transthoracic approach was used. The study included complete 2D imaging, M-mode, complete spectral Doppler, and color Doppler. The  heart rate was 95 bpm, at the start of the study. Images were obtained from the parasternal, apical, subcostal, and suprasternal notch acoustic windows. Echocardiographic views were limited due to lung interference. Image quality  was  adequate.    LEFT VENTRICLE: Size was normal. Systolic function was normal. Ejection fraction was estimated to be 55 %. There were no regional wall motion abnormalities. Wall thickness was normal. DOPPLER: Doppler parameters were consistent with  abnormal left ventricular relaxation (grade 1 diastolic dysfunction).    RIGHT VENTRICLE: The size was normal. Systolic function was normal. Wall thickness was normal.    LEFT ATRIUM: Size was normal.    RIGHT ATRIUM: Size was normal.    MITRAL VALVE: Valve structure was normal. There was normal leaflet separation. DOPPLER: The transmitral velocity was within the normal range. There was no evidence for stenosis. There was mild regurgitation.    AORTIC VALVE: The valve was trileaflet. Leaflets exhibited mildly increased thickness, mild calcification, and mildly reduced cuspal separation. DOPPLER: Transaortic velocity was increased due to valvular stenosis. There was mild stenosis.  There was no significant regurgitation.    TRICUSPID VALVE: The valve structure was normal. There was normal leaflet separation. DOPPLER: The transtricuspid velocity was within the normal range. There was no evidence for stenosis. There was mild regurgitation. Pulmonary artery  systolic pressure was within the normal range. Estimated peak PA pressure was 25 mmHg.    PULMONIC VALVE: Leaflets exhibited normal thickness, no calcification, and normal cuspal separation. DOPPLER: The transpulmonic velocity was within the normal range. There was no significant regurgitation.    PERICARDIUM: There was no pericardial effusion. The pericardium was normal in appearance.    AORTA: The root exhibited normal size.    SYSTEMIC VEINS: IVC: The inferior vena cava was normal in size. Respirophasic changes were normal.    MEASUREMENT TABLES    2D MEASUREMENTS  LVOT   (Reference normals)  Diam   20 mm   (--)    DOPPLER MEASUREMENTS  LVOT   (Reference normals)  Peak amira   84 cm/s   (--)  Mean amira   64 cm/s    (--)  VTI   18 cm   (--)  Peak gradient   3 mmHg   (--)  Mean gradient   2 mmHg   (--)  Stroke vol   56.55 ml   (--)  Aortic valve   (Reference normals)  Peak dariel   211 cm/s   (--)  Mean dariel   145 cm/s   (--)  VTI   40 cm   (--)  Peak gradient   18 mmHg   (--)  Mean gradient   10 mmHg   (--)  Obstr index, VTI   0.45    (--)  Valve area, VTI   1.41 cmï¾²   (--)  Area index, VTI   0.78 cmï¾²/mï¾²   (--)  Obstr index, Vmax   0.4    (--)  Valve area, Vmax   1.26 cmï¾²   (--)  Area index, Vmax   0.7 cmï¾²/mï¾²   (--)  Obstr index, Vmean   0.44    (--)  Valve area, Vmean   1.38 cmï¾²   (--)  Area index, Vmean   0.77 cmï¾²/mï¾²   (--)    SYSTEM MEASUREMENT TABLES    2D  %FS: 25.57 %  Ao Diam: 3.16 cm  Ao asc: 3.74 cm  EDV(Teich): 71.74 ml  EF(Teich): 50.89 %  ESV(Teich): 35.23 ml  HR_4Ch_Q: 81.08 BPM  IVSd: 1 cm  LA Diam: 4.09 cm  LAAs A4C: 18.66 cm2  LAESV A-L A4C: 55.68 ml  LAESV MOD A4C: 52.9 ml  LALs A4C: 5.31 cm  LVCO_4Ch_Q: 4.29 L/min  LVEDV MOD A4C: 76.67 ml  LVEF MOD A4C: 65.03 %  LVEF_4Ch_Q: 54.58 %  LVESV MOD A4C: 26.81 ml  LVIDd: 4.04 cm  LVIDs: 3.01 cm  LVLd A4C: 7.62 cm  LVLd_4Ch_Q: 7.2 cm  LVLs A4C: 5.88 cm  LVLs_4Ch_Q: 5.83 cm  LVOT Diam: 1.97 cm  LVPWd: 0.94 cm  LVSV_4Ch_Q: 52.89 ml  LVVED_4Ch_Q: 96.91 ml  LVVES_4Ch_Q: 44.02 ml  RAEDV A-L: 29.34 ml  RAEDV MOD: 30.11 ml  RALd: 5.27 cm  RVIDd: 3.27 cm  SV MOD A4C: 49.86 ml  SV(Teich): 36.51 ml    CW  AV Env.Ti: 286.87 ms  AV VTI: 39.16 cm  AV Vmax: 1.99 m/s  AV Vmean: 1.37 m/s  AV maxPG: 15.85 mmHg  AV meanP.62 mmHg  TR Vmax: 2.31 m/s  TR maxP.29 mmHg    MM  TAPSE: 1.87 cm    PW  TESSIE (VTI): 1.27 cm2  TESSIE Vmax: 1.26 cm2  AVAI (VTI): 0 cm2/m2  AVAI Vmax: 0 cm2/m2  E' Sept: 0.06 m/s  E/E' Sept: 13.55  LVOT Env.Ti: 281.64 ms  LVOT VTI: 16.33 cm  LVOT Vmax: 0.82 m/s  LVOT Vmean: 0.58 m/s  LVOT maxP.72 mmHg  LVOT meanP.51 mmHg  LVSI Dopp: 27.54 ml/m2  LVSV Dopp: 49.58 ml  MV A Dariel: 1.21 m/s  MV Dec [place]: 5.87 m/s2  MV DecT: 163.53 ms  MV E Dariel:  0.88 m/s  MV E/A Ratio: 0.73  MV PHT: 42.3 ms  MVA By PHT: 5.2 cm2    [employer] Commission Accredited Echocardiography Laboratory    Prepared and electronically signed by    Jeffrey Beltrán MD  Signed 03-Sep-2021 15:49:00    No results found for this or any previous visit.      CATH:  No results found for this or any previous visit.      STRESS TEST:  No results found for this or any previous visit.      VTE Prophylaxis: Heparin

## 2025-05-06 NOTE — INTERVAL H&P NOTE
Vitals:    05/06/25 0539   BP: 141/77   Pulse: (!) 54   Resp: 18   Temp: (!) 97.2 °F (36.2 °C)   SpO2: 100%         H&P reviewed. After examining the patient I find no changes in the patients condition since the H&P was completed.    Plan for TAVR.    Preoperative Beta Blocker: relative contraindication.    Anticipated Length of Stay: Patient will be admitted on an inpatient basis with an anticipated length of stay of grater than 2 midnights.  Justification for Hospital StaY: Post surgical recovery following open heart surgery.      DIMAS Caballero MD  05/06/25  7:22 AM

## 2025-05-06 NOTE — OP NOTE
OPERATIVE REPORT  PATIENT NAME: Christian Jones    :  1935  MRN: [id number]  Pt Location: [facility] OR ROOM 02    SURGERY DATE: 2025    SURGEON: DIMAS Caballero MD     ASSISTANT: Edith Goodrich PA-C    CO-SURGEON: Dr. Willis Mckeon     PREOPERATIVE DIAGNOSIS Symptomatic severe aortic stenosis.     POSTOPERATIVE DIAGNOSIS Symptomatic severe aortic stenosis.     NYHA CLASS: 3    CCS CLASS: 3    PROCEDURE Transcatheter aortic valve replacement with a 26 mm Marinelli AIMEE 3 Ultra Resilia bioprosthetic valve via a percutaneous right transfemoral approach.     ANESTHESIA Dr. Bertha Osorio, general endotracheal anesthesia with transesophageal echocardiogram guidance.     CARDIOPULMONARY BYPASS TIME 0.    PACKS/TUBES/DRAINS None.     ESTIMATED BLOOD LOSS: 20 mL    OPERATIVE TECHNIQUE The patient was taken to the operating room and placed supine on the operating table. Following the satisfactory induction of general anesthesia and placement of monitoring lines, the patient was prepped and draped in the usual sterile fashion. A time-out procedure was performed.     The right common femoral artery was accessed percutaneously using Seldinger technique and fluoroscopy.  Two (2) Perclose sutures were deployed in the standard fashion. The right common femoral vein was accessed in a similar fashion and was cannulated with a 6 Georgian sheath. The femoral artery was cannulated with a 7 Georgian sheath. A pigtail catheter was inserted and advanced through the right common femoral artery sheath into the right coronary cusp. Using a series of injections, the angle of deployment was determined. Through the right common femoral vein sheath, a balloon tip temporary pacing catheter was inserted into the right ventricle and its capture was confirmed.     The patient was systemically heparinized. The right common femoral artery sheath was then removed over an extra-stiff wire and the delivery sheath was inserted through the right  common femoral artery and advanced into the aorta. The aortic valve was crossed with a wire.    A 26 mm AIMEE 3 Ultra Resilia valve was then advanced through the sheath into the aorta and positioned onto the deployment balloon in the aorta and then advanced around the aortic arch and through the annulus of the aortic valve to the appropriate level. At this point, the catheter was desheathed in the standard fashion. The valve was positioned appropriately using a combination of fluoroscopy and transesophageal echocardiogram guidance. During an episode of rapid pacing, balloon deployment of the 26 mm AIMEE 3 Ultra Resilia valve was performed. Following deployment, the position of the valve was confirmed by fluoroscopy and echocardiography and its position appeared appropriate with no perivalvular leak.     The valve delivery system was subsequently removed and the sheath was removed from the right common femoral artery while the Perclose sutures were secured and direct pressure was held. Protamine was administered with normalization of the ACT. Pressure was released from the right groin and there was no active bleeding and no evidence of hematoma development. The common femoral vein sheath was removed from the right and pressure was held.     Sponge, needle, and instrument counts were reported as correct by the nursing staff. As the attending surgeon, I was present and scrubbed for all critical portions of this procedure. Final transesophageal echocardiogram demonstrated a well-positioned bioprosthetic transcatheter aortic valve with normal function and no perivalvular leak.     The case required the expertise of a cardiac surgeon as well as an interventional cardiologist to act as co-surgeons per CMS requirements.      SIGNATURE: DIMAS Caballero MD  DATE: May 6, 2025  TIME: 8:44 AM

## 2025-05-06 NOTE — ANESTHESIA POSTPROCEDURE EVALUATION
Post-Op Assessment Note    CV Status:  Stable    Pain management: adequate       Mental Status:  Alert, awake and sleepy   Hydration Status:  Stable   PONV Controlled:  Controlled   Airway Patency:  Patent     Post Op Vitals Reviewed: Yes    No anethesia notable event occurred.    Staff: CRNA       Last Filed PACU Vitals:  Vitals Value Taken Time   Temp     Pulse 94 05/06/25 0851   /58 05/06/25 0851   Resp     SpO2 100 % 05/06/25 0851   Vitals shown include unfiled device data.

## 2025-05-06 NOTE — ASSESSMENT & PLAN NOTE
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

## 2025-05-06 NOTE — ASSESSMENT & PLAN NOTE
At risk secondary to age, medications  Fall precautions  Keep physically active  Recommend fall prevention/ balance training such as Matter of Balance or [employer] or yoga  Fall prevention handout given from cdc.gov/steadi

## 2025-05-06 NOTE — ANESTHESIA PROCEDURE NOTES
Procedure Performed: HIEN Anesthesia  Start Time:  5/6/2025 7:51 AM        Preanesthesia Checklist    Patient identified, IV assessed, risks and benefits discussed, monitors and equipment assessed, procedure being performed at surgeon's request and anesthesia consent obtained.      Procedure    Diagnostic Indications for HIEN:  assessment of ascending aorta, assessment of surgical repair and hemodynamic monitoring. Type of HIEN: interventional HIEN with real time guidance of intracardiac procedure. Images Saved: ultrasound permanent image saved. Physician Requesting Echo: DIMAS Caballero MD.  Location performed: OR. Intubated.  Heart visualized. Insertion of HIEN Probe:  Easy. Probe Type:  Epiaortic and multiplane. Modalities:  Color flow mapping, 3D, continuous wave Doppler and pulse wave Doppler.      Echocardiographic and Doppler Measurements    PREPROCEDURE    LEFT VENTRICLE:  Systolic Function: normal. Ejection Fraction: 50-55%. Cavity size: normal.   Regional Wall Motion Abnormalities: none.    RIGHT VENTRICLE:  Systolic Function: normal.  Cavity size normal. No hypertrophy              AORTIC VALVE:  Leaflets: trileaflet. Leaflet motions restricted. Stenosis: severe. Mean Gradient: 21 mmHg. Peak Gradient: 38 mmHg.  Area: 0.91 cm². Regurgitation: none.      MITRAL VALVE:  Leaflets: normal. Leaflet Motions: normal. Regurgitation: mild.   Stenosis: none.       TRICUSPID VALVE:  Leaflets: normal. Leaflet Motions: normal. Stenosis: none. Regurgitation: mild.      PULMONIC VALVE:  Leaflets: normal. Regurgitation: none. Stenosis: none.        ASCENDING AORTA:  Size:  normal.  Dissection not present.      AORTIC ARCH:  Size:  normal.  dissection not present. Grade 3: atheroma protruding < 0.5 cm into lumen.    DESCENDING AORTA:  Size: normal.  Dissection not present. Grade 3: atheroma protruding < 0.5 cm into lumen.        RIGHT ATRIUM:  Size:  normal. No spontaneous echo contrast.    LEFT ATRIUM:  Size: normal. No  spontaneous echo contrast.    LEFT ATRIAL APPENDAGE:  Size: normal. No spontaneous echo contrast         ATRIAL SEPTUM:  Intra-atrial septal morphology: normal.          VENTRICULAR SEPTUM:  Intra-ventricular septum morphology: normal.             OTHER FINDINGS:  Pericardium:  normal. Pleural Effusion:  none.        POSTPROCEDURE    LEFT VENTRICLE: Unchanged .         RIGHT VENTRICLE: Unchanged .           AORTIC VALVE:   Leaflets: bioprosthetic. Stenosis: none. Mean Gradient: 3 mmHg. Regurgitation: 2 trace PVLs seen and trace.  Valve Size: 26 mm.    MITRAL VALVE: Unchanged .         TRICUSPID VALVE: Unchanged .                ATRIA: Unchanged .          AORTA: Unchanged .        REMOVAL:  Probe Removal: atraumatic.

## 2025-05-06 NOTE — Clinical Note
The PACER GENERATOR CASIE XT DR KASHMIR ARDON - [id number] device was inserted. The leads were placed into the connector and visually verified to be in correct position. Injury current obtained.

## 2025-05-06 NOTE — ANESTHESIA PROCEDURE NOTES
"Arterial Line Insertion    Performed by: Bertha Osorio MD  Authorized by: Bertha Osorio MD  Consent: Verbal consent obtained. Written consent obtained.  Risks and benefits: risks, benefits and alternatives were discussed  Consent given by: patient  Patient understanding: patient states understanding of the procedure being performed  Patient consent: the patient's understanding of the procedure matches consent given  Required items: required blood products, implants, devices, and special equipment available  Patient identity confirmed: arm band and verbally with patient  Time out: Immediately prior to procedure a \"time out\" was called to verify the correct patient, procedure, equipment, support staff and site/side marked as required.  Preparation: Patient was prepped and draped in the usual sterile fashion.  Indications: multiple ABGs and hemodynamic monitoring  Orientation:  Left  Location: radial artery  Sedation:  Patient sedated: no    Procedure Details:      Needle gauge: 20  Placement technique:  Anatomical landmarks and ultrasound guided  Ultrasound image availability:  Stored in EPIC  Number of attempts: 1    Post-procedure:  Post-procedure: dressing applied  Waveform: good waveform  Post-procedure CNS: unchanged  Patient tolerance: Patient tolerated the procedure well with no immediate complications and patient tolerated the procedure well with no immediate complications          "

## 2025-05-06 NOTE — ASSESSMENT & PLAN NOTE
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

## 2025-05-06 NOTE — RESPIRATORY THERAPY NOTE
RT Protocol Note  Christian Jones 89 y.o. male MRN: [id number]  Unit/Bed#: PPHP-306-01 Encounter: [id number]    Assessment    Principal Problem:    Aortic stenosis  Active Problems:    CKD (chronic kidney disease) stage 3, GFR 30-59 ml/min ([facility])    Peripheral vascular disease ([facility])    NSVT (nonsustained ventricular tachycardia) ([facility])    Coronary artery disease involving native coronary artery    CKD stage 3a, GFR 45-59 ml/min ([facility])    Benign hypertension with chronic kidney disease, stage III ([facility])    Anemia    S/p TAVR (transcatheter aortic valve replacement), bioprosthetic      Home Pulmonary Medications:         Past Medical History:   Diagnosis Date    Aortic valve, bicuspid 2024    Arthritis 2000    Basal cell carcinoma     Head    Depression 1990    Ear problems     GERD (gastroesophageal reflux disease) 1990    Heart murmur long standing    Hyperlipemia     Hypertension     Positive fecal occult blood test     Psoriasis     Rectal bleeding     Tinnitus     continuing    Visual impairment 1950    corrected     Social History     Socioeconomic History    Marital status: /Civil Union     Spouse name: Not on file    Number of children: Not on file    Years of education: Not on file    Highest education level: Not on file   Occupational History    Not on file   Tobacco Use    Smoking status: Former     Current packs/day: 0.00     Average packs/day: 0.3 packs/day for 15.0 years (3.8 ttl pk-yrs)     Types: Pipe, Cigarettes     Start date:      Quit date: 1985     Years since quittin.9    Smokeless tobacco: Never   Vaping Use    Vaping status: Never Used   Substance and Sexual Activity    Alcohol use: Yes     Alcohol/week: 13.0 standard drinks of alcohol     Types: 7 Glasses of wine, 6 Cans of beer per week     Comment: drink with meals    Drug use: Never    Sexual activity: Not Currently     Partners: Female     Birth control/protection: Condom Male   Other Topics Concern  "   Not on file   Social History Narrative    Marital Status:      As per [employer]     Social Drivers of Health     Financial Resource Strain: Low Risk  (2023)    Overall Financial Resource Strain (CARDIA)     Difficulty of Paying Living Expenses: Not hard at all   Food Insecurity: No Food Insecurity (2025)    Nursing - Inadequate Food Risk Classification     Worried About Running Out of Food in the Last Year: Never true     Ran Out of Food in the Last Year: Never true     Ran Out of Food in the Last Year: Never true   Transportation Needs: No Transportation Needs (2025)    Nursing - Transportation Risk Classification     Lack of Transportation: Not on file     Lack of Transportation: No   Physical Activity: Not on file   Stress: Not on file   Social Connections: Not on file   Intimate Partner Violence: Unknown (2025)    Nursing IPS     Feels Physically and Emotionally Safe: Not on file     Physically Hurt by Someone: Not on file     Humiliated or Emotionally Abused by Someone: Not on file     Physically Hurt by Someone: No     Hurt or Threatened by Someone: No   Housing Stability: Unknown (2025)    Nursing: Inadequate Housing Risk Classification     Has Housing: Not on file     Worried About Losing Housing: Not on file     Unable to Get Utilities: Not on file     Unable to Pay for Housing in the Last Year: No     Has Housin       Subjective         Objective    Physical Exam:   Assessment Type: (P) Assess only  General Appearance: (P) Alert, Awake  Respiratory Pattern: (P) Spontaneous, Normal  Chest Assessment: (P) Chest expansion symmetrical  Bilateral Breath Sounds: (P) Clear    Vitals:  Blood pressure 128/64, pulse 94, temperature (!) 97.3 °F (36.3 °C), resp. rate 14, height 5' 5\" (1.651 m), weight 68.3 kg (150 lb 9.6 oz), SpO2 95%.          Imaging and other studies:           Plan    Respiratory Plan: (P) Discontinue Protocol  Airway Clearance Plan: (P) Incentive " Spirometer     Resp Comments: (P) post operative pt with no pulmonary Hx or home resp meeds. BS clear SPO2 97%, no signs of resp distress at this time. resp protocol will be D/C pt will be osredered and instructed on I.S Q1 W/A. will continue to monitor

## 2025-05-06 NOTE — ANESTHESIA PROCEDURE NOTES
"Central Line Insertion    Performed by: Cordelia Gaffney CRNA  Authorized by: Bertha Osorio MD    Date/Time: 5/6/2025 7:49 AM  Consent: Verbal consent obtained. Written consent obtained.  Risks and benefits: risks, benefits and alternatives were discussed  Consent given by: patient  Patient understanding: patient states understanding of the procedure being performed  Patient consent: the patient's understanding of the procedure matches consent given  Required items: required blood products, implants, devices, and special equipment available  Patient identity confirmed: arm band, provided demographic data and hospital-assigned identification number  Time out: Immediately prior to procedure a \"time out\" was called to verify the correct patient, procedure, equipment, support staff and site/side marked as required.  Indications: vascular access and central pressure monitoring  Catheter size: 7 Fr  Patient position: Trendelenburg  Assessment: blood return through all ports and free fluid flow  Preparation: skin prepped with 2% chlorhexidine  Skin prep agent dried: skin prep agent completely dried prior to procedure  Sterile barriers: all five maximum sterile barriers used - cap, mask, sterile gown, sterile gloves, and large sterile sheet  Hand hygiene: hand hygiene performed prior to central venous catheter insertion  sterile gel and probe cover used in ultrasound-guided central venous catheter insertionultrasound permanent image saved  Vessel of Catheter Tip End: svc  Number of attempts: 1  Successful placement: yes  Post-procedure: line sutured, dressing applied and chlorhexidine patch applied  Patient tolerance: Patient tolerated the procedure well with no immediate complications and patient tolerated the procedure well with no immediate complications        "

## 2025-05-06 NOTE — QUICK NOTE
Quick Note - Cardiothoracic Surgery   Christian Jones 89 y.o. male MRN: [id number]  Unit/Bed#: OR POOL Encounter: [id number]      Christian Jones underwent transcatheter aortic valve replacement at [facility] today.  Following surgery, 12 lead ECG was completed and new LBBB () was identified.  Electrophysiology was notified via [employer] messenger to confirm this finding.     A repeat ECG was ordered, to be completed in three hours.  The EP PA on call will follow up the results and formally complete a consultation of this finding has not resolved.    Edith Goodrich PA-C  05/06/25  9:04 AM

## 2025-05-06 NOTE — PLAN OF CARE
Problem: Prexisting or High Potential for Compromised Skin Integrity  Goal: Skin integrity is maintained or improved  Description: INTERVENTIONS:- Identify patients at risk for skin breakdown- Assess and monitor skin integrity- Assess and monitor nutrition and hydration status- Monitor labs - Assess for incontinence - Turn and reposition patient- Assist with mobility/ambulation- Relieve pressure over bony prominences- Avoid friction and shearing- Provide appropriate hygiene as needed including keeping skin clean and dry- Evaluate need for skin moisturizer/barrier cream- Collaborate with interdisciplinary team - Patient/family teaching- Consider wound care consult   Outcome: Progressing     Problem: PAIN - ADULT  Goal: Verbalizes/displays adequate comfort level or baseline comfort level  Description: Interventions:- Encourage patient to monitor pain and request assistance- Assess pain using appropriate pain scale- Administer analgesics based on type and severity of pain and evaluate response- Implement non-pharmacological measures as appropriate and evaluate response- Consider cultural and social influences on pain and pain management- Notify physician/advanced practitioner if interventions unsuccessful or patient reports new pain  Outcome: Progressing     Problem: INFECTION - ADULT  Goal: Absence or prevention of progression during hospitalization  Description: INTERVENTIONS:- Assess and monitor for signs and symptoms of infection- Monitor lab/diagnostic results- Monitor all insertion sites, i.e. indwelling lines, tubes, and drains- Monitor endotracheal if appropriate and nasal secretions for changes in amount and color- Smiley appropriate cooling/warming therapies per order- Administer medications as ordered- Instruct and encourage patient and family to use good hand hygiene technique- Identify and instruct in appropriate isolation precautions for identified infection/condition  Outcome: Progressing  Goal:  Absence of fever/infection during neutropenic period  Description: INTERVENTIONS:- Monitor WBC  Outcome: Progressing     Problem: SAFETY ADULT  Goal: Patient will remain free of falls  Description: INTERVENTIONS:- Educate patient/family on patient safety including physical limitations- Instruct patient to call for assistance with activity - Consult OT/PT to assist with strengthening/mobility - Keep Call bell within reach- Keep bed low and locked with side rails adjusted as appropriate- Keep care items and personal belongings within reach- Initiate and maintain comfort rounds- Make Fall Risk Sign visible to staff- Offer Toileting every  Hours, in advance of need- Initiate/Maintain alarm- Obtain necessary fall risk management equipment: - Apply yellow socks and bracelet for high fall risk patients- Consider moving patient to room near nurses station  Outcome: Progressing  Goal: Maintain or return to baseline ADL function  Description: INTERVENTIONS:-  Assess patient's ability to carry out ADLs; assess patient's baseline for ADL function and identify physical deficits which impact ability to perform ADLs (bathing, care of mouth/teeth, toileting, grooming, dressing, etc.)- Assess/evaluate cause of self-care deficits - Assess range of motion- Assess patient's mobility; develop plan if impaired- Assess patient's need for assistive devices and provide as appropriate- Encourage maximum independence but intervene and supervise when necessary- Involve family in performance of ADLs- Assess for home care needs following discharge - Consider OT consult to assist with ADL evaluation and planning for discharge- Provide patient education as appropriate  Outcome: Progressing  Goal: Maintains/Returns to pre admission functional level  Description: INTERVENTIONS:- Perform AM-PAC 6 Click Basic Mobility/ Daily Activity assessment daily.- Set and communicate daily mobility goal to care team and patient/family/caregiver. - Collaborate  with rehabilitation services on mobility goals if consulted- Perform Range of Motion  times a day.- Reposition patient every  hours.- Dangle patient  times a day- Stand patient  times a day- Ambulate patient  times a day- Out of bed to chair  times a day - Out of bed for meals  times a day- Out of bed for toileting- Record patient progress and toleration of activity level   Outcome: Progressing     Problem: DISCHARGE PLANNING  Goal: Discharge to home or other facility with appropriate resources  Description: INTERVENTIONS:- Identify barriers to discharge w/patient and caregiver- Arrange for needed discharge resources and transportation as appropriate- Identify discharge learning needs (meds, wound care, etc.)- Arrange for interpretive services to assist at discharge as needed- Refer to Case Management Department for coordinating discharge planning if the patient needs post-hospital services based on physician/advanced practitioner order or complex needs related to functional status, cognitive ability, or social support system  Outcome: Progressing     Problem: Knowledge Deficit  Goal: Patient/family/caregiver demonstrates understanding of disease process, treatment plan, medications, and discharge instructions  Description: Complete learning assessment and assess knowledge base.Interventions:- Provide teaching at level of understanding- Provide teaching via preferred learning methods  Outcome: Progressing

## 2025-05-06 NOTE — ASSESSMENT & PLAN NOTE
Lab Results   Component Value Date    EGFR 44 05/06/2025    EGFR 47 05/02/2025    EGFR 57 04/26/2025    CREATININE 1.40 (H) 05/06/2025    CREATININE 1.33 (H) 05/02/2025    CREATININE 1.13 04/26/2025   Appears to be at baseline  No prior encounters with nephrology noted in epic  Avoid nephrotoxic medications

## 2025-05-06 NOTE — ASSESSMENT & PLAN NOTE
Noted on pre TAVR cath with severe 2V CAD   LAD 65% prox 70% mid long and 80% distal lesion   LCX mild diffuse disease   Multifocal RCA dz distal 65% and mid RPDA 90%   Statin, ASA, BB plavix

## 2025-05-06 NOTE — CONSULTS
Consultation - Geriatric Medicine   Name: Christian Jones 89 y.o. male I MRN: [id number]  Unit/Bed#: PPHP-306-01 I Date of Admission: 5/6/2025   Date of Service: 5/6/2025 I Hospital Day: 0   Inpatient consult to Gerontology for [facility]  Consult performed by: KAI Garcia  Consult ordered by: Edith Goodrich PA-C        Physician Requesting Evaluation: DIMAS Caballero MD   Reason for Evaluation / Principal Problem: aortic stenosis    Assessment & Plan  Aortic stenosis  S/p TAVR  EP on consult  Echo pending  NSVT (nonsustained ventricular tachycardia) ([facility])  EP on consult  NPO after midnight for possible ppm  CKD stage 3a, GFR 45-59 ml/min ([facility])  Lab Results   Component Value Date    EGFR 44 05/06/2025    EGFR 47 05/02/2025    EGFR 57 04/26/2025    CREATININE 1.40 (H) 05/06/2025    CREATININE 1.33 (H) 05/02/2025    CREATININE 1.13 04/26/2025   Appears to be at baseline  No prior encounters with nephrology noted in epic  Avoid nephrotoxic medications  Frailty  Clinical Frail Scale: 4- Vulnerable  Not dependent for daily help, symptoms limit activity  Feeling slowed up, tired during the day   Is caregiver for wife, has supportive daughter and friends  PT/OT  Encounter for support to caregiver  Pt is caregiver to wife who had stroke 2 years ago  Has supportive friends and daughter  Reports he is able to take breaks and be socially active  Reports he has hobby of photography  At risk for falls  At risk secondary to age, medications  Fall precautions  Keep physically active  Recommend fall prevention/ balance training such as Matter of Balance or [employer] or yoga  Fall prevention handout given from cdc.gov/steadi   BPH (benign prostatic hyperplasia)  Follows with urology as outpatient, last OV 9/16/24  Reports nocturia 3x night  On alfuzosin   Montes in place post op  Cognitive screening  No reported memory loss  Recommend check TSH and Vitamin B12  maintain neuro protective lifestyle :   Keep  physically, mentally and socially active   Lower BMI   Increase physical exercise   Recommend Mediterranean/MIND diet   Monitor good control of blood pressure, blood sugar and cholestrerol           History of Present Illness   Hx and PE limited by: NA  HPI: Christian Jones is a 89 y.o. year old male who presents with aortic stenosis. He is admitted for transcatheter aortic stenosis.    He has HTN, hyperlipidemia, CKD3, BPH    Prior to arrival he lives at home with his wife. He is caregiver for his wife who had a stroke 2 years ago. He is independent with IADLs and ADLs. He ambulates independently. He has nocturia. He states he spends most of his time as caregiver for his wife. He is able to get breaks. His wife has friends she can spend time with and a daughter who helps so he can get out. He likes to do photography.    Upon exam he is lying in bed, oriented x 4.     Review of Systems   Constitutional:  Negative for unexpected weight change.   HENT:  Negative for hearing loss.    Eyes:  Negative for visual disturbance.   Respiratory:  Negative for cough.    Cardiovascular:  Negative for chest pain.   Gastrointestinal:  Negative for constipation.   Genitourinary:  Negative for difficulty urinating.   Musculoskeletal:  Negative for gait problem.   Skin:  Negative for color change.   Neurological:  Negative for dizziness.   Psychiatric/Behavioral:  Negative for sleep disturbance.            Historical Information   Past Medical History:   Diagnosis Date    Aortic valve, bicuspid 12/1/2024    Arthritis 1/2000    Basal cell carcinoma     Head    Depression 1/1990    Ear problems 1980    GERD (gastroesophageal reflux disease) 1/1990    Heart murmur long standing    Hyperlipemia     Hypertension     Positive fecal occult blood test     Psoriasis     Rectal bleeding     Tinnitus 1980    continuing    Visual impairment 1/1950    corrected     Past Surgical History:   Procedure Laterality Date    BASAL CELL CARCINOMA  EXCISION      CARDIAC CATHETERIZATION N/A 4/15/2025    Procedure: Cardiac Coronary Angiogram;  Surgeon: Marlene Carney DO;  [facility];  Service: Cardiology    CARDIAC CATHETERIZATION N/A 2025    Procedure: Cardiac PCI;  Surgeon: Marlene Carney DO;  [facility]: [facility];  Service: Cardiology    CARDIAC CATHETERIZATION N/A 2025    Procedure: Cardiac PCI Stent;  Surgeon: Marlene Carney DO;  Location: [facility];  Service: Cardiology    COLONOSCOPY      COLONOSCOPY  2019    HERNIA REPAIR  2012    Inguinal    LIPOMA RESECTION      Scalp    SIGMOIDOSCOPY       Social History     Tobacco Use    Smoking status: Former     Current packs/day: 0.00     Average packs/day: 0.3 packs/day for 15.0 years (3.8 ttl pk-yrs)     Types: Pipe, Cigarettes     Start date:      Quit date: 1985     Years since quittin.9    Smokeless tobacco: Never   Vaping Use    Vaping status: Never Used   Substance and Sexual Activity    Alcohol use: Yes     Alcohol/week: 13.0 standard drinks of alcohol     Types: 7 Glasses of wine, 6 Cans of beer per week     Comment: drink with meals    Drug use: Never    Sexual activity: Not Currently     Partners: Female     Birth control/protection: Condom Male     E-Cigarette/Vaping    E-Cigarette Use Never User      E-Cigarette/Vaping Substances     Family History   Problem Relation Age of Onset    Hypertension Father     Kidney disease Father     Thrombosis Mother         cause of death    Heart failure Mother               Social History     Tobacco Use    Smoking status: Former     Current packs/day: 0.00     Average packs/day: 0.3 packs/day for 15.0 years (3.8 ttl pk-yrs)     Types: Pipe, Cigarettes     Start date:      Quit date: 1985     Years since quittin.9    Smokeless tobacco: Never   Vaping Use    Vaping status: Never Used   Substance and Sexual Activity    Alcohol use: Yes     Alcohol/week: 13.0 standard  drinks of alcohol     Types: 7 Glasses of wine, 6 Cans of beer per week     Comment: drink with meals    Drug use: Never    Sexual activity: Not Currently     Partners: Female     Birth control/protection: Condom Male       Current Facility-Administered Medications:     acetaminophen (TYLENOL) rectal suppository 650 mg, Q4H PRN    acetaminophen (TYLENOL) tablet 650 mg, Q4H PRN    amLODIPine (NORVASC) tablet 10 mg, Daily    aspirin chewable tablet 81 mg, Daily    atorvastatin (LIPITOR) tablet 80 mg, Daily    bisacodyl (DULCOLAX) rectal suppository 10 mg, Daily PRN    calcium gluconate 2 g in sodium chloride 0.9% 100 mL (premix), Once PRN    ceFAZolin (ANCEF) IVPB (premix in dextrose) 2,000 mg 50 mL, Q8H, Last Rate: Stopped (05/06/25 1500)    chlorhexidine (PERIDEX) 0.12 % oral rinse 15 mL, BID    clopidogrel (PLAVIX) tablet 75 mg, Daily    [START ON 5/7/2025] heparin (porcine) subcutaneous injection 5,000 Units, Q8H ELSY **AND** [CANCELED] Platelet count, Once    hydrALAZINE (APRESOLINE) injection 10 mg, Q6H PRN    insulin lispro (HumALOG/ADMELOG) 100 units/mL subcutaneous injection 1-5 Units, TID AC **AND** Fingerstick Glucose (POCT), TID AC    insulin lispro (HumALOG/ADMELOG) 100 units/mL subcutaneous injection 1-5 Units, HS    meclizine (ANTIVERT) tablet 25 mg, Q8H PRN    metoprolol succinate (TOPROL-XL) 24 hr tablet 25 mg, Daily    mupirocin (BACTROBAN) 2 % nasal ointment 1 Application, Q12H ELSY    niCARdipine (CARDENE) 25 mg (STANDARD CONCENTRATION) in sodium chloride 0.9% 250 mL, Titrated, Last Rate: Stopped (05/06/25 1400)    ondansetron (ZOFRAN) injection 4 mg, Q6H PRN    pantoprazole (PROTONIX) EC tablet 40 mg, Daily    polyethylene glycol (MIRALAX) packet 17 g, Daily    tamsulosin (FLOMAX) capsule 0.4 mg, Daily With Dinner  Patient has no known allergies.    Meds/Allergies   Home medication review  Wegmans   Plavix 75 mg po daily, last refill 4/27/25 # 90 days  Atorvastatin 80 mg po daily, last refill  4/15/25 # 90 days  Asa 81 mg po daily, last refill 4/13/25 #30 days  Meclizine 25 mg po TID prn, last refill 4/12/25  Pantoprazole 40 mg po daily, last refill 4/12/25 # 30 days  Alfuzosin ER 10 mg po q evening last refill 4/26/25 # 30 days  Metoprolol ER 25 mg po daily, last refill 2/24/25 # 90 days  Norvasc 10 mg po daily, last refill 3/11/25 # 90 days        Objective :  Temp:  [97 °F (36.1 °C)-98.2 °F (36.8 °C)] 98.1 °F (36.7 °C)  HR:  [] 102  BP: (102-146)/(55-77) 120/60  Resp:  [12-22] 22  SpO2:  [94 %-100 %] 97 %  O2 Device: Nasal cannula  Nasal Cannula O2 Flow Rate (L/min):  [1 L/min-2 L/min] 2 L/min    Physical Exam  Vitals and nursing note reviewed.   HENT:      Head: Normocephalic.      Nose: No congestion.      Mouth/Throat:      Mouth: Mucous membranes are moist.   Eyes:      General:         Right eye: No discharge.         Left eye: No discharge.   Cardiovascular:      Rate and Rhythm: Normal rate. Rhythm irregular.      Pulses: Normal pulses.   Pulmonary:      Effort: Pulmonary effort is normal.      Breath sounds: Normal breath sounds.   Abdominal:      General: Bowel sounds are normal.      Palpations: Abdomen is soft.   Musculoskeletal:         General: Normal range of motion.      Cervical back: Normal range of motion.   Skin:     General: Skin is warm and dry.   Neurological:      Mental Status: He is alert and oriented to person, place, and time. Mental status is at baseline.   Psychiatric:         Mood and Affect: Mood normal.           Lab Results: I have reviewed the following results:CBC/BMP:   .     05/06/25  0845 05/06/25  0857   HGB 9.9* 11.2*   HCT 29* 35.0*   PLT  --  177   SODIUM  --  139   K  --  3.9   CL  --  111*   CO2 21 21   BUN  --  21   CREATININE  --  1.40*   GLUC  --  123   CAIONIZED 1.11*  --         Imaging Results Review: I reviewed radiology reports from this admission including: procedure reports.  Other Study Results Review: EKG was reviewed.     Therapies:    ANGELA Achieved: 2: Bed activities/Dependent transfer      VTE Prophylaxis: Sequential compression device (Venodyne)     Code Status: Level 1 - Full Code      Family and Social Support:   No data recorded    Goals of Care: full code    I have spent a total time of 75 minutes in caring for this patient on the day of the visit/encounter including Diagnostic results, Prognosis, Risks and benefits of tx options, Instructions for management, Patient and family education, Importance of tx compliance, Risk factor reductions, Impressions, Counseling / Coordination of care, Documenting in the medical record, Reviewing/placing orders in the medical record (including tests, medications, and/or procedures), Obtaining or reviewing history  , and Communicating with other healthcare professionals .

## 2025-05-06 NOTE — ASSESSMENT & PLAN NOTE
New after TAVR with prior QRS duration 80 ms with LBBB 140 MS   3 hour EKG shows persistently long, albeit shorter  ms   On metoprolol in the outpatient setting for NSVT and CAD   Restart metoprolol - for CAD, PVC/NSVT  Having significant ectopy on tele post TAVR   Npo at midnight for consideration for pacer  Monitor rhythm over 24 hours

## 2025-05-06 NOTE — ASSESSMENT & PLAN NOTE
Noted on prior holter monitor   Does have frequent PVCs on monitor here   Last echo with preserved EF   He is on metoprolol 25 mg daily for control - currently on hold for LBBB

## 2025-05-07 ENCOUNTER — APPOINTMENT (INPATIENT)
Dept: RADIOLOGY | Facility: HOSPITAL | Age: 89
DRG: 267 | End: 2025-05-07
Payer: MEDICARE

## 2025-05-07 ENCOUNTER — ANESTHESIA (INPATIENT)
Dept: NON INVASIVE DIAGNOSTICS | Facility: HOSPITAL | Age: 89
DRG: 267 | End: 2025-05-07
Payer: MEDICARE

## 2025-05-07 PROBLEM — I47.10 SVT (SUPRAVENTRICULAR TACHYCARDIA) (HCC): Status: ACTIVE | Noted: 2025-05-07

## 2025-05-07 PROBLEM — I49.8 BIGEMINY: Status: ACTIVE | Noted: 2025-05-07

## 2025-05-07 PROBLEM — I49.1 PAC (PREMATURE ATRIAL CONTRACTION): Status: ACTIVE | Noted: 2025-05-07

## 2025-05-07 LAB
ABO GROUP BLD BPU: NORMAL
ANION GAP SERPL CALCULATED.3IONS-SCNC: 10 MMOL/L (ref 4–13)
ATRIAL RATE: 63 BPM
ATRIAL RATE: 90 BPM
ATRIAL RATE: 92 BPM
ATRIAL RATE: 97 BPM
BPU ID: NORMAL
BUN SERPL-MCNC: 20 MG/DL (ref 5–25)
CALCIUM SERPL-MCNC: 8.3 MG/DL (ref 8.4–10.2)
CHLORIDE SERPL-SCNC: 107 MMOL/L (ref 96–108)
CO2 SERPL-SCNC: 23 MMOL/L (ref 21–32)
CREAT SERPL-MCNC: 1.56 MG/DL (ref 0.6–1.3)
CROSSMATCH: NORMAL
ERYTHROCYTE [DISTWIDTH] IN BLOOD BY AUTOMATED COUNT: 14.3 % (ref 11.6–15.1)
GFR SERPL CREATININE-BSD FRML MDRD: 39 ML/MIN/1.73SQ M
GLUCOSE SERPL-MCNC: 106 MG/DL (ref 65–140)
GLUCOSE SERPL-MCNC: 107 MG/DL (ref 65–140)
GLUCOSE SERPL-MCNC: 124 MG/DL (ref 65–140)
GLUCOSE SERPL-MCNC: 130 MG/DL (ref 65–140)
GLUCOSE SERPL-MCNC: 148 MG/DL (ref 65–140)
HCT VFR BLD AUTO: 35.3 % (ref 36.5–49.3)
HGB BLD-MCNC: 11.4 G/DL (ref 12–17)
MAGNESIUM SERPL-MCNC: 1.9 MG/DL (ref 1.9–2.7)
MCH RBC QN AUTO: 28.8 PG (ref 26.8–34.3)
MCHC RBC AUTO-ENTMCNC: 32.3 G/DL (ref 31.4–37.4)
MCV RBC AUTO: 89 FL (ref 82–98)
P AXIS: 4 DEGREES
P AXIS: 50 DEGREES
P AXIS: 62 DEGREES
PLATELET # BLD AUTO: 187 THOUSANDS/UL (ref 149–390)
PMV BLD AUTO: 11 FL (ref 8.9–12.7)
POTASSIUM SERPL-SCNC: 3.9 MMOL/L (ref 3.5–5.3)
PR INTERVAL: 182 MS
PR INTERVAL: 202 MS
PR INTERVAL: 220 MS
QRS AXIS: 104 DEGREES
QRS AXIS: 108 DEGREES
QRS AXIS: 120 DEGREES
QRS AXIS: 97 DEGREES
QRSD INTERVAL: 136 MS
QRSD INTERVAL: 140 MS
QRSD INTERVAL: 144 MS
QRSD INTERVAL: 80 MS
QT INTERVAL: 358 MS
QT INTERVAL: 432 MS
QT INTERVAL: 440 MS
QT INTERVAL: 546 MS
QTC INTERVAL: 457 MS
QTC INTERVAL: 491 MS
QTC INTERVAL: 542 MS
QTC INTERVAL: 558 MS
RBC # BLD AUTO: 3.96 MILLION/UL (ref 3.88–5.62)
SODIUM SERPL-SCNC: 140 MMOL/L (ref 135–147)
T WAVE AXIS: 263 DEGREES
T WAVE AXIS: 46 DEGREES
T WAVE AXIS: 65 DEGREES
T WAVE AXIS: 84 DEGREES
UNIT DISPENSE STATUS: NORMAL
UNIT PRODUCT CODE: NORMAL
UNIT PRODUCT VOLUME: 350 ML
UNIT RH: NORMAL
VENTRICULAR RATE: 63 BPM
VENTRICULAR RATE: 75 BPM
VENTRICULAR RATE: 95 BPM
VENTRICULAR RATE: 98 BPM
WBC # BLD AUTO: 11.26 THOUSAND/UL (ref 4.31–10.16)

## 2025-05-07 PROCEDURE — 97163 PT EVAL HIGH COMPLEX 45 MIN: CPT

## 2025-05-07 PROCEDURE — 3E0102A INTRODUCTION OF ANTI-INFECTIVE ENVELOPE INTO SUBCUTANEOUS TISSUE, OPEN APPROACH: ICD-10-PCS | Performed by: STUDENT IN AN ORGANIZED HEALTH CARE EDUCATION/TRAINING PROGRAM

## 2025-05-07 PROCEDURE — 71045 X-RAY EXAM CHEST 1 VIEW: CPT

## 2025-05-07 PROCEDURE — 02HK3JZ INSERTION OF PACEMAKER LEAD INTO RIGHT VENTRICLE, PERCUTANEOUS APPROACH: ICD-10-PCS | Performed by: STUDENT IN AN ORGANIZED HEALTH CARE EDUCATION/TRAINING PROGRAM

## 2025-05-07 PROCEDURE — C1785 PMKR, DUAL, RATE-RESP: HCPCS | Performed by: STUDENT IN AN ORGANIZED HEALTH CARE EDUCATION/TRAINING PROGRAM

## 2025-05-07 PROCEDURE — 93005 ELECTROCARDIOGRAM TRACING: CPT

## 2025-05-07 PROCEDURE — C1898 LEAD, PMKR, OTHER THAN TRANS: HCPCS | Performed by: STUDENT IN AN ORGANIZED HEALTH CARE EDUCATION/TRAINING PROGRAM

## 2025-05-07 PROCEDURE — 02H63JZ INSERTION OF PACEMAKER LEAD INTO RIGHT ATRIUM, PERCUTANEOUS APPROACH: ICD-10-PCS | Performed by: STUDENT IN AN ORGANIZED HEALTH CARE EDUCATION/TRAINING PROGRAM

## 2025-05-07 PROCEDURE — C1769 GUIDE WIRE: HCPCS | Performed by: STUDENT IN AN ORGANIZED HEALTH CARE EDUCATION/TRAINING PROGRAM

## 2025-05-07 PROCEDURE — 99233 SBSQ HOSP IP/OBS HIGH 50: CPT | Performed by: THORACIC SURGERY (CARDIOTHORACIC VASCULAR SURGERY)

## 2025-05-07 PROCEDURE — 83735 ASSAY OF MAGNESIUM: CPT | Performed by: PHYSICIAN ASSISTANT

## 2025-05-07 PROCEDURE — 0JH606Z INSERTION OF PACEMAKER, DUAL CHAMBER INTO CHEST SUBCUTANEOUS TISSUE AND FASCIA, OPEN APPROACH: ICD-10-PCS | Performed by: STUDENT IN AN ORGANIZED HEALTH CARE EDUCATION/TRAINING PROGRAM

## 2025-05-07 PROCEDURE — 80048 BASIC METABOLIC PNL TOTAL CA: CPT | Performed by: PHYSICIAN ASSISTANT

## 2025-05-07 PROCEDURE — 97167 OT EVAL HIGH COMPLEX 60 MIN: CPT

## 2025-05-07 PROCEDURE — 82948 REAGENT STRIP/BLOOD GLUCOSE: CPT

## 2025-05-07 PROCEDURE — 85027 COMPLETE CBC AUTOMATED: CPT | Performed by: PHYSICIAN ASSISTANT

## 2025-05-07 PROCEDURE — C1887 CATHETER, GUIDING: HCPCS | Performed by: STUDENT IN AN ORGANIZED HEALTH CARE EDUCATION/TRAINING PROGRAM

## 2025-05-07 PROCEDURE — 33208 INSRT HEART PM ATRIAL & VENT: CPT | Performed by: STUDENT IN AN ORGANIZED HEALTH CARE EDUCATION/TRAINING PROGRAM

## 2025-05-07 PROCEDURE — C1892 INTRO/SHEATH,FIXED,PEEL-AWAY: HCPCS | Performed by: STUDENT IN AN ORGANIZED HEALTH CARE EDUCATION/TRAINING PROGRAM

## 2025-05-07 PROCEDURE — 93010 ELECTROCARDIOGRAM REPORT: CPT | Performed by: INTERNAL MEDICINE

## 2025-05-07 PROCEDURE — 76937 US GUIDE VASCULAR ACCESS: CPT | Performed by: STUDENT IN AN ORGANIZED HEALTH CARE EDUCATION/TRAINING PROGRAM

## 2025-05-07 DEVICE — LEAD 5076-52 MRI US RCMCRD
Type: IMPLANTABLE DEVICE | Site: HEART | Status: FUNCTIONAL
Brand: CAPSUREFIX NOVUS MRI™ SURESCAN®

## 2025-05-07 DEVICE — IPG W1DR01 AZURE XT DR MRI USA
Type: IMPLANTABLE DEVICE | Site: CHEST  WALL | Status: FUNCTIONAL
Brand: AZURE™ XT DR MRI SURESCAN™

## 2025-05-07 DEVICE — LEAD 3830 US MKT/ 69CM MRI LBBAP
Type: IMPLANTABLE DEVICE | Site: HEART | Status: FUNCTIONAL
Brand: SELECTSECURE™ MRI SURESCAN™

## 2025-05-07 DEVICE — ENVELOPE CMRM6122 ABSORB MED MR
Type: IMPLANTABLE DEVICE | Site: CHEST  WALL | Status: FUNCTIONAL
Brand: TYRX™

## 2025-05-07 RX ORDER — ACETAMINOPHEN 325 MG/1
650 TABLET ORAL EVERY 4 HOURS PRN
Status: DISCONTINUED | OUTPATIENT
Start: 2025-05-07 | End: 2025-05-08 | Stop reason: HOSPADM

## 2025-05-07 RX ORDER — CEFAZOLIN SODIUM 2 G/50ML
2000 SOLUTION INTRAVENOUS ONCE
Status: COMPLETED | OUTPATIENT
Start: 2025-05-07 | End: 2025-05-07

## 2025-05-07 RX ORDER — HEPARIN SODIUM 5000 [USP'U]/ML
5000 INJECTION, SOLUTION INTRAVENOUS; SUBCUTANEOUS EVERY 8 HOURS SCHEDULED
Status: DISCONTINUED | OUTPATIENT
Start: 2025-05-07 | End: 2025-05-08 | Stop reason: HOSPADM

## 2025-05-07 RX ORDER — LIDOCAINE HYDROCHLORIDE 10 MG/ML
INJECTION, SOLUTION EPIDURAL; INFILTRATION; INTRACAUDAL; PERINEURAL CODE/TRAUMA/SEDATION MEDICATION
Status: DISCONTINUED | OUTPATIENT
Start: 2025-05-07 | End: 2025-05-07 | Stop reason: HOSPADM

## 2025-05-07 RX ORDER — ACETAMINOPHEN 650 MG/1
650 SUPPOSITORY RECTAL EVERY 4 HOURS PRN
Status: DISCONTINUED | OUTPATIENT
Start: 2025-05-07 | End: 2025-05-08 | Stop reason: HOSPADM

## 2025-05-07 RX ORDER — SODIUM CHLORIDE, SODIUM LACTATE, POTASSIUM CHLORIDE, CALCIUM CHLORIDE 600; 310; 30; 20 MG/100ML; MG/100ML; MG/100ML; MG/100ML
20 INJECTION, SOLUTION INTRAVENOUS CONTINUOUS
Status: DISCONTINUED | OUTPATIENT
Start: 2025-05-07 | End: 2025-05-08 | Stop reason: HOSPADM

## 2025-05-07 RX ORDER — MECLIZINE HYDROCHLORIDE 25 MG/1
25 TABLET ORAL EVERY 8 HOURS PRN
Status: DISCONTINUED | OUTPATIENT
Start: 2025-05-07 | End: 2025-05-08 | Stop reason: HOSPADM

## 2025-05-07 RX ORDER — PROPOFOL 10 MG/ML
INJECTION, EMULSION INTRAVENOUS CONTINUOUS PRN
Status: DISCONTINUED | OUTPATIENT
Start: 2025-05-07 | End: 2025-05-07

## 2025-05-07 RX ORDER — FENTANYL CITRATE 50 UG/ML
INJECTION, SOLUTION INTRAMUSCULAR; INTRAVENOUS AS NEEDED
Status: DISCONTINUED | OUTPATIENT
Start: 2025-05-07 | End: 2025-05-07

## 2025-05-07 RX ORDER — TORSEMIDE 20 MG/1
20 TABLET ORAL DAILY
Status: DISCONTINUED | OUTPATIENT
Start: 2025-05-07 | End: 2025-05-08 | Stop reason: HOSPADM

## 2025-05-07 RX ORDER — POTASSIUM CHLORIDE 1500 MG/1
20 TABLET, EXTENDED RELEASE ORAL DAILY
Status: DISCONTINUED | OUTPATIENT
Start: 2025-05-07 | End: 2025-05-08 | Stop reason: HOSPADM

## 2025-05-07 RX ORDER — SODIUM CHLORIDE 9 MG/ML
INJECTION, SOLUTION INTRAVENOUS CONTINUOUS PRN
Status: DISCONTINUED | OUTPATIENT
Start: 2025-05-07 | End: 2025-05-07

## 2025-05-07 RX ORDER — GENTAMICIN 40 MG/ML
INJECTION, SOLUTION INTRAMUSCULAR; INTRAVENOUS CODE/TRAUMA/SEDATION MEDICATION
Status: DISCONTINUED | OUTPATIENT
Start: 2025-05-07 | End: 2025-05-07 | Stop reason: HOSPADM

## 2025-05-07 RX ADMIN — PHENYLEPHRINE HYDROCHLORIDE 30 MCG/MIN: 10 INJECTION INTRAVENOUS at 14:14

## 2025-05-07 RX ADMIN — SODIUM CHLORIDE: 0.9 INJECTION, SOLUTION INTRAVENOUS at 14:07

## 2025-05-07 RX ADMIN — PROPOFOL 50 MCG/KG/MIN: 10 INJECTION, EMULSION INTRAVENOUS at 14:07

## 2025-05-07 RX ADMIN — CHLORHEXIDINE GLUCONATE 15 ML: 1.2 SOLUTION ORAL at 08:51

## 2025-05-07 RX ADMIN — NOREPINEPHRINE BITARTRATE 16 MCG: 1 INJECTION, SOLUTION, CONCENTRATE INTRAVENOUS at 15:13

## 2025-05-07 RX ADMIN — NOREPINEPHRINE BITARTRATE 16 MCG: 1 INJECTION, SOLUTION, CONCENTRATE INTRAVENOUS at 15:04

## 2025-05-07 RX ADMIN — FENTANYL CITRATE 25 MCG: 50 INJECTION INTRAMUSCULAR; INTRAVENOUS at 14:44

## 2025-05-07 RX ADMIN — ASPIRIN 81 MG CHEWABLE TABLET 81 MG: 81 TABLET CHEWABLE at 08:52

## 2025-05-07 RX ADMIN — CEFAZOLIN SODIUM 2000 MG: 2 SOLUTION INTRAVENOUS at 14:14

## 2025-05-07 RX ADMIN — PHENYLEPHRINE HYDROCHLORIDE 100 MCG: 10 INJECTION INTRAVENOUS at 15:02

## 2025-05-07 RX ADMIN — PHENYLEPHRINE HYDROCHLORIDE 200 MCG: 10 INJECTION INTRAVENOUS at 14:56

## 2025-05-07 RX ADMIN — POTASSIUM CHLORIDE 20 MEQ: 1500 TABLET, EXTENDED RELEASE ORAL at 08:51

## 2025-05-07 RX ADMIN — CHLORHEXIDINE GLUCONATE 15 ML: 1.2 SOLUTION ORAL at 18:41

## 2025-05-07 RX ADMIN — TORSEMIDE 20 MG: 20 TABLET ORAL at 08:52

## 2025-05-07 RX ADMIN — PANTOPRAZOLE SODIUM 40 MG: 40 TABLET, DELAYED RELEASE ORAL at 08:52

## 2025-05-07 RX ADMIN — ATORVASTATIN CALCIUM 80 MG: 80 TABLET, FILM COATED ORAL at 08:52

## 2025-05-07 RX ADMIN — FENTANYL CITRATE 25 MCG: 50 INJECTION INTRAMUSCULAR; INTRAVENOUS at 14:30

## 2025-05-07 RX ADMIN — CEFAZOLIN SODIUM 2000 MG: 2 SOLUTION INTRAVENOUS at 06:08

## 2025-05-07 RX ADMIN — POLYETHYLENE GLYCOL 3350 17 G: 17 POWDER, FOR SOLUTION ORAL at 08:51

## 2025-05-07 RX ADMIN — PHENYLEPHRINE HYDROCHLORIDE 100 MCG: 10 INJECTION INTRAVENOUS at 14:47

## 2025-05-07 RX ADMIN — CLOPIDOGREL BISULFATE 75 MG: 75 TABLET, FILM COATED ORAL at 08:52

## 2025-05-07 RX ADMIN — HEPARIN SODIUM 5000 UNITS: 5000 INJECTION, SOLUTION INTRAVENOUS; SUBCUTANEOUS at 06:08

## 2025-05-07 RX ADMIN — FENTANYL CITRATE 50 MCG: 50 INJECTION INTRAMUSCULAR; INTRAVENOUS at 14:10

## 2025-05-07 RX ADMIN — MUPIROCIN 1 APPLICATION: 20 OINTMENT TOPICAL at 21:21

## 2025-05-07 RX ADMIN — MUPIROCIN 1 APPLICATION: 20 OINTMENT TOPICAL at 08:51

## 2025-05-07 RX ADMIN — NOREPINEPHRINE BITARTRATE 4 MCG/MIN: 1 INJECTION, SOLUTION, CONCENTRATE INTRAVENOUS at 15:06

## 2025-05-07 RX ADMIN — AMLODIPINE BESYLATE 10 MG: 10 TABLET ORAL at 08:52

## 2025-05-07 RX ADMIN — HEPARIN SODIUM 5000 UNITS: 5000 INJECTION, SOLUTION INTRAVENOUS; SUBCUTANEOUS at 18:41

## 2025-05-07 NOTE — PLAN OF CARE
Problem: Prexisting or High Potential for Compromised Skin Integrity  Goal: Skin integrity is maintained or improved  Description: INTERVENTIONS:- Identify patients at risk for skin breakdown- Assess and monitor skin integrity- Assess and monitor nutrition and hydration status- Monitor labs - Assess for incontinence - Turn and reposition patient- Assist with mobility/ambulation- Relieve pressure over bony prominences- Avoid friction and shearing- Provide appropriate hygiene as needed including keeping skin clean and dry- Evaluate need for skin moisturizer/barrier cream- Collaborate with interdisciplinary team - Patient/family teaching- Consider wound care consult   Outcome: Progressing     Problem: PAIN - ADULT  Goal: Verbalizes/displays adequate comfort level or baseline comfort level  Description: Interventions:- Encourage patient to monitor pain and request assistance- Assess pain using appropriate pain scale- Administer analgesics based on type and severity of pain and evaluate response- Implement non-pharmacological measures as appropriate and evaluate response- Consider cultural and social influences on pain and pain management- Notify physician/advanced practitioner if interventions unsuccessful or patient reports new pain  Outcome: Progressing     Problem: INFECTION - ADULT  Goal: Absence or prevention of progression during hospitalization  Description: INTERVENTIONS:- Assess and monitor for signs and symptoms of infection- Monitor lab/diagnostic results- Monitor all insertion sites, i.e. indwelling lines, tubes, and drains- Monitor endotracheal if appropriate and nasal secretions for changes in amount and color- [place] appropriate cooling/warming therapies per order- Administer medications as ordered- Instruct and encourage patient and family to use good hand hygiene technique- Identify and instruct in appropriate isolation precautions for identified infection/condition  Outcome: Progressing  Goal:  Absence of fever/infection during neutropenic period  Description: INTERVENTIONS:- Monitor WBC  Outcome: Progressing     Problem: SAFETY ADULT  Goal: Patient will remain free of falls  Description: INTERVENTIONS:- Educate patient/family on patient safety including physical limitations- Instruct patient to call for assistance with activity - Consult OT/PT to assist with strengthening/mobility - Keep Call bell within reach- Keep bed low and locked with side rails adjusted as appropriate- Keep care items and personal belongings within reach- Initiate and maintain comfort rounds- Make Fall Risk Sign visible to staff- Offer Toileting every 2 Hours, in advance of need- Initiate/Maintain bed alarm- Obtain necessary fall risk management equipment: - Apply yellow socks and bracelet for high fall risk patients- Consider moving patient to room near nurses station  Outcome: Progressing  Goal: Maintain or return to baseline ADL function  Description: INTERVENTIONS:-  Assess patient's ability to carry out ADLs; assess patient's baseline for ADL function and identify physical deficits which impact ability to perform ADLs (bathing, care of mouth/teeth, toileting, grooming, dressing, etc.)- Assess/evaluate cause of self-care deficits - Assess range of motion- Assess patient's mobility; develop plan if impaired- Assess patient's need for assistive devices and provide as appropriate- Encourage maximum independence but intervene and supervise when necessary- Involve family in performance of ADLs- Assess for home care needs following discharge - Consider OT consult to assist with ADL evaluation and planning for discharge- Provide patient education as appropriate  Outcome: Progressing  Goal: Maintains/Returns to pre admission functional level  Description: INTERVENTIONS:- Perform AM-PAC 6 Click Basic Mobility/ Daily Activity assessment daily.- Set and communicate daily mobility goal to care team and patient/family/caregiver. -  Collaborate with rehabilitation services on mobility goals if consulted- Perform Range of Motion 3 times a day.- Reposition patient every 2 hours.- Dangle patient 3 times a day- Stand patient 2 times a day- Ambulate patient 1 times a day- Out of bed to chair 3 times a day - Out of bed for meals 3 times a day- Out of bed for toileting- Record patient progress and toleration of activity level   Outcome: Progressing     Problem: DISCHARGE PLANNING  Goal: Discharge to home or other facility with appropriate resources  Description: INTERVENTIONS:- Identify barriers to discharge w/patient and caregiver- Arrange for needed discharge resources and transportation as appropriate- Identify discharge learning needs (meds, wound care, etc.)- Arrange for interpretive services to assist at discharge as needed- Refer to Case Management Department for coordinating discharge planning if the patient needs post-hospital services based on physician/advanced practitioner order or complex needs related to functional status, cognitive ability, or social support system  Outcome: Progressing     Problem: Knowledge Deficit  Goal: Patient/family/caregiver demonstrates understanding of disease process, treatment plan, medications, and discharge instructions  Description: Complete learning assessment and assess knowledge base.Interventions:- Provide teaching at level of understanding- Provide teaching via preferred learning methods  Outcome: Progressing

## 2025-05-07 NOTE — PLAN OF CARE
Problem: OCCUPATIONAL THERAPY ADULT  Goal: Performs self-care activities at highest level of function for planned discharge setting.  See evaluation for individualized goals.  Description: Treatment Interventions: ADL retraining, Functional transfer training, Endurance training, Continued evaluation, Energy conservation, Cardiac education          See flowsheet documentation for full assessment, interventions and recommendations.   Outcome: Progressing  Note: Limitation: Decreased ADL status, Decreased Safe judgement during ADL, Decreased endurance, Decreased self-care trans, Decreased high-level ADLs  Prognosis: Good  Assessment: Pt is a 89 y.o. male who was admitted to [facility] on 5/6/2025 with Aortic stenosis, s/p TAVR, with LBBB and undergoing workup with EP, possible need for PPM. Pt seen for an OT evaluation per active OT orders.  Pt  has a past medical history of Aortic valve, bicuspid, Arthritis, Basal cell carcinoma, Depression, Ear problems, GERD (gastroesophageal reflux disease), Heart murmur, Hyperlipemia, Hypertension, Positive fecal occult blood test, Psoriasis, Rectal bleeding, Tinnitus, and Visual impairment. Pt lives in a [facility] with 2 [facility], uses a walk-in shower with seat and GB, standard toilet. Pta, pt was independent w/ ADL/IADL and functional mobility, was (+) driving and was not using any DME at baseline. Currently, pt is Supervision for UB ADL, Min Ax1 for LB ADL, and completed transfers/short FM w supervision- Min Ax1 with RW. Pt currently presents with impairments in the following categories -difficulty performing ADLS and limited insight into deficits activity tolerance and endurance. These impairments, as well as pt's fatigue and cardiac/sternal precautions  limit pt's ability to safely engage in all baseline areas of occupation, includinggrooming, bathing, dressing, toileting, and functional mobility/transfers.  The patient's raw score on the AM-PAC Daily Activity Inpatient  Short Form is 19. A raw score of greater than or equal to 19 suggests the patient may benefit from discharge to home. Please refer to the recommendation of the Occupational Therapist for safe discharge planning. Pt would benefit from continued acute OT services throughout hospital course and following D/C. Plan for OT interventions 2-3x per week. From OT standpoint, recommend home with increased social support, level III services upon D/C. Pt was left seated in bedside chair with chair alarm on and all needs within reach.     Rehab Resource Intensity Level, OT: III (Minimum Resource Intensity)

## 2025-05-07 NOTE — PLAN OF CARE
Problem: PHYSICAL THERAPY ADULT  Goal: Performs mobility at highest level of function for planned discharge setting.  See evaluation for individualized goals.  Description: Treatment/Interventions: Functional transfer training, LE strengthening/ROM, Elevations, Therapeutic exercise, Endurance training, Equipment eval/education, Bed mobility, Gait training, Spoke to nursing, OT  Equipment Recommended: Walker (at this time)       See flowsheet documentation for full assessment, interventions and recommendations.  Note: Prognosis: Good  Problem List: Decreased strength, Decreased endurance, Impaired balance, Decreased mobility, Decreased safety awareness, Impaired hearing  Assessment: Pt is 89  y.o. male admitted with Dx of Symptomatic severe aortic stenosis and underwent Transcatheter aortic valve replacement with a 26 mm Marinelli AIMEE 3 Ultra Resilia bioprosthetic valve via a percutaneous right transfemoral approach on 5/6/2025. Postop course included: Left bundle branch block; EP consulted and following; PPM placement is being considered. Pt 's comorbidities affecting POC include: Arthritis (1/2000), Depression, CAD, CKD, anemia, NSVT, Hypertension, and Visual impairment, and personal factors of: advanced age and CHAR. Pt's clinical presentation is currently unstable/unpredictable which is evident in ongoing telem monitoring, abn lab values and ongoing postop management w/ EP following and possible PPM pending. Pt presents w/ min overall weakness, decreased functional endurance and inconsistent amb balance and gait patterns but w/ overall observed mobility status appearing to be near premorbid level. Will cont to follow pt in PT for progressive mobilization to max level of (I), endurance, and safety. Otherwise, anticipate pt will return home w/ available family support upon D/C pending additional progress and when medically cleared; D/C recommendations are outlined below. Will cont to follow until then.         Rehab Resource Intensity Level, PT: III (Minimum Resource Intensity)    See flowsheet documentation for full assessment.

## 2025-05-07 NOTE — ANESTHESIA PREPROCEDURE EVALUATION
Procedure:  Cardiac pacer implant (Chest)    Relevant Problems   CARDIO   (+) Aortic stenosis   (+) Bigeminy   (+) Coronary artery disease involving native coronary artery   (+) Dyspnea on exertion   (+) Essential hypertension   (+) New onset left bundle branch block (LBBB)   (+) PAC (premature atrial contraction)   (+) Peripheral vascular disease (HCC)   (+) SVT (supraventricular tachycardia) (HCC)      GI/HEPATIC   (+) Rectal bleeding      /RENAL   (+) BPH (benign prostatic hyperplasia)   (+) Benign hypertension with chronic kidney disease, stage III (HCC)   (+) CKD (chronic kidney disease) stage 3, GFR 30-59 ml/min (HCC)   (+) CKD stage 3a, GFR 45-59 ml/min (HCC)      HEMATOLOGY   (+) Anemia      NEURO/PSYCH   (+) Depression, recurrent (HCC)      PULMONARY   (+) Dyspnea on exertion      LV EF 55, normal RV systolic function, s/p TAVR (AIMEE 26; mean 6 mmHg)  Physical Exam    Airway    Mallampati score: II         Dental       Cardiovascular      Pulmonary      Other Findings        Anesthesia Plan  ASA Score- 4     Anesthesia Type- IV sedation with anesthesia with ASA Monitors.         Additional Monitors:     Airway Plan:     Comment: IV sedation, GA back up; standard ASA monitors. Risks and benefits discussed with patient; patient consented and agrees to proceed.    I saw and evaluated the patient. If seen with CRNA, we have discussed the anesthetic plan and I am in agreement that the plan is appropriate for the patient.  .       Plan Factors-    Chart reviewed.   Existing labs reviewed.                   Induction- intravenous.    Postoperative Plan-     Perioperative Resuscitation Plan - Level 1 - Full Code.       Informed Consent- Anesthetic plan and risks discussed with patient.  I personally reviewed this patient with the CRNA. Discussed and agreed on the Anesthesia Plan with the CRNA..      NPO Status:  Vitals Value Taken Time   Date of last liquid 05/05/25 05/06/25 0603   Time of last liquid 1800  05/06/25 0603   Date of last solid 05/05/25 05/06/25 0603   Time of last solid 1800 05/06/25 0603

## 2025-05-07 NOTE — ANESTHESIA POSTPROCEDURE EVALUATION
Post-Op Assessment Note    CV Status:  Stable  Pain Score: 0    Pain management: adequate       Mental Status:  Alert and awake   Hydration Status:  Euvolemic   PONV Controlled:  Controlled   Airway Patency:  Patent  Two or more mitigation strategies used for obstructive sleep apnea   Post Op Vitals Reviewed: Yes    No anethesia notable event occurred.    Staff: CRNA           Last Filed PACU Vitals:  Vitals Value Taken Time   Temp     Pulse 86    /72    Resp 14    SpO2 99% RA

## 2025-05-07 NOTE — PROGRESS NOTES
Progress Note - Cardiac Surgery   Christian Jones 89 y.o. male MRN: [id number]  [facility] En[facility]: [id number]    Aortic stenosis, Non-Rheumatic. S/P transfemoral transcatheter aortic valve replacement; POD # 1    24 Hour Events: Stitch x1 for RIJ TLC oozing & manual pressure held w/ improvement. EP eval complete, resumed on beta blocker for frequent ectopy w/ episodes of bradycardia into the 40s overnight. Seen by geriatrics w/o changes to medical regimen made. No other events. No complaints. Voiding well s/p tiwari removal. (+) flatus, (-) BM. OOB to chair, has not ambulated otherwise yet this AM. Pain controlled.     Medications:   Scheduled Meds:  Current Facility-Administered Medications   Medication Dose Route Frequency Provider Last Rate    acetaminophen  650 mg Rectal Q4H PRN Edith Kp, PA-C      acetaminophen  650 mg Oral Q4H PRN Edith Kp, PA-C      amLODIPine  10 mg Oral Daily Edith Kp, PA-C      aspirin  81 mg Oral Daily Edith Kp, PA-C      atorvastatin  80 mg Oral Daily Edith Kp, PA-C      bisacodyl  10 mg Rectal Daily PRN Edith Kp, PA-C      calcium gluconate  2 g Intravenous Once PRN Edith Kp, PA-C      chlorhexidine  15 mL Mouth/Throat BID Edith Kp, PA-C      clopidogrel  75 mg Oral Daily Edith Kp, PA-C      heparin (porcine)  5,000 Units Subcutaneous Q8H ELSY Edith Kp, PA-C      hydrALAZINE  10 mg Intravenous Q6H PRN Edith Kp, PA-C      insulin lispro  1-5 Units Subcutaneous TID AC Edith Kp, PA-C      insulin lispro  1-5 Units Subcutaneous HS Edith Kp, PA-C      meclizine  25 mg Oral Q8H PRN Edith Kp, PA-C      metoprolol succinate  25 mg Oral Daily Michael Kumar PA-C      mupirocin  1 Application Nasal Q12H ELSY Edith Kp, PA-C      niCARdipine  1-15 mg/hr Intravenous Titrated Edith Kp, PA-C Stopped (05/06/25 2116)    ondansetron  4 mg Intravenous Q6H PRN Edith Kp, PA-C      pantoprazole  40 mg Oral Daily Edith Goodrich,  TR      polyethylene glycol  17 g Oral Daily Edith Goodrich PA-C      tamsulosin  0.4 mg Oral Daily With Dinner Edith Goodrich PA-C       Continuous Infusions:niCARdipine, 1-15 mg/hr, Last Rate: Stopped (05/06/25 2116)      PRN Meds:.  acetaminophen    acetaminophen    bisacodyl    calcium gluconate    hydrALAZINE    meclizine    ondansetron    Vitals:   Vitals:    05/07/25 0400 05/07/25 0500 05/07/25 0544 05/07/25 0600   BP: 130/60 111/54  130/71   BP Location:       Pulse: 66 62  66   Resp: 19 21 19   Temp:    98.2 °F (36.8 °C)   TempSrc:    Oral   SpO2: 95% 93%  95%   Weight:   67.3 kg (148 lb 5.9 oz)    Height:         Bp x24hrs: 110-150/50-70    Telemetry: NSR; Heart Rate: 90; intermittent episodes of bradycardia into the 40s (asymptomatic) & runs of SVT in the 100-110s (asymptomatic) overnight    Respiratory:   SpO2: SpO2: 95 %, SpO2 Activity: SpO2 Activity: At Rest, SpO2 Device: O2 Device: None (Room air); Room Air    Intake/Output:     Intake/Output Summary (Last 24 hours) at 5/7/2025 0703  Last data filed at 5/7/2025 0400  Gross per 24 hour   Intake 1596.33 ml   Output 3000 ml   Net -1403.67 ml      UOP - 200cc/8hrs; 2950cc/24hrs    Weights:   Weight (last 2 days)       Date/Time Weight    05/07/25 0544 67.3 (148.37)    05/06/25 1230 68 (150)    05/06/25 0539 68.3 (150.6)          Admit weight: 68.3kg - down 1kg from admission weight    Results:   Results from last 7 days   Lab Units 05/07/25  0405 05/06/25  0857 05/06/25  0845 05/06/25  0748 05/02/25  1108   WBC Thousand/uL 11.26*  --   --   --  7.26   HEMOGLOBIN g/dL 11.4* 11.2*  --   --  12.1   I STAT HEMOGLOBIN g/dl  --   --  9.9*   < >  --    HEMATOCRIT % 35.3* 35.0*  --   --  38.8   HEMATOCRIT, ISTAT %  --   --  29*   < >  --    PLATELETS Thousands/uL 187 177  --   --  243    < > = values in this interval not displayed.     Results from last 7 days   Lab Units 05/07/25  0405 05/06/25  0857 05/06/25  0845 05/06/25  0748 05/02/25  1108   POTASSIUM  mmol/L 3.9 3.9  --   --  4.3   CHLORIDE mmol/L 107 111*  --   --  108   CO2 mmol/L 23 21  --   --  22   CO2, I-STAT mmol/L  --   --  21   < >  --    BUN mg/dL 20 21  --   --  22   CREATININE mg/dL 1.56* 1.40*  --   --  1.33*   GLUCOSE, ISTAT mg/dl  --   --  107   < >  --    CALCIUM mg/dL 8.3* 8.0*  --   --  8.9    < > = values in this interval not displayed.     Results from last 7 days   Lab Units 05/02/25  1108   INR  1.07     Recent Labs     05/07/25  0405   MG 1.9     Point of care glucose: 130 - 115 - 106 - 114 - 127 - 163    Studies:    ECG: NSR w/ PACs/bigeminy, rate 63, 1st AVB ( from 198 immediately post-op from 202 pre-op), LBBB w/  from 144 immediately post-op & 70 pre-op    CXR: persistent mild to moderate central PVC, stable bibasilar atelectasis, no gross PTX or effusion, TLC in place --> FINAL REPORT PENDING    Echocardiogram: EF 55%, grade 1 DD, TAVR well seated w/o malfunction or PVL (mean 6, TESSIE 2.19cm2)    Results Review Statement: I personally reviewed the following image studies in PACS and associated radiology reports: EKG, chest xray, and Echocardiogram. My interpretation of the radiology images/reports is: as above.    Invasive Lines/Tubes:  Invasive Devices       Central Venous Catheter Line  Duration             CVC Central Lines 05/06/25 <1 day              Peripheral Intravenous Line  Duration             Peripheral IV 05/06/25 Right Hand 1 day    Peripheral IV 05/06/25 Left Forearm <1 day                    Physical Exam:    General: No acute distress  HEENT/NECK:  Normocephalic. Atraumatic.  No jugular venous distention.    Cardiac: Regular rate and rhythm and No murmurs/rubs/gallops  Pulmonary:  good inspiratory effort, equal expansion b/l, (+) inspiratory rales today  Abdomen:  Non-tender, Non-distended, and Normal bowel sounds  Incisions: Groin puncture sites dressed with sterile dressing.  No hematoma, erythema, or drainage  Extremities: Extremities warm/dry, DP pulses  palpable bilaterally, and Trace edema B/L  Neuro: Alert and oriented X 3  Skin: Warm/Dry, without rashes or lesions.    Assessment:  Principal Problem:    Aortic stenosis  Active Problems:    CKD (chronic kidney disease) stage 3, GFR 30-59 ml/min ([facility])    Peripheral vascular disease ([facility])    NSVT (nonsustained ventricular tachycardia) ([facility])    Coronary artery disease involving native coronary artery    CKD stage 3a, GFR 45-59 ml/min ([facility])    Benign hypertension with chronic kidney disease, stage III ([facility])    Anemia    S/p TAVR (transcatheter aortic valve replacement), bioprosthetic    New onset left bundle branch block (LBBB)    Frailty    Encounter for support to caregiver    At risk for falls    BPH (benign prostatic hyperplasia)       Aortic stenosis, Non-Rheumatic. S/P transfemoral transcatheter aortic valve replacement; POD # 1    Plan:    Cardiac:     Normal ventricular systolic function, EF 55%    NSR w/ new LBBB as well as tachy-ren syndrome; HR well-controlled  EP following      HTN Regimen:  Continue Toprol XL, 25mg PO Daily  Continue Norvasc, 10 mg PO Daily    TAVR anticoagulation regimen: ASA/Plavix   On Plavix PTA for multiple TRUNG placements from LM through LAD    Postoperative transthoracic echocardiogram:  Echo completed; Well seated AV, without PVL    Continue Statin therapy    Maintain central IV access today til EP reeval then consider discontinuing    Continue Subcutaneous Heparin for DVT prophylaxis    Pulmonary:     Good Room air oxygen saturation; Continue incentive spirometry/Coughing/Deep breathing exercises    Renal:     Preoperative CKD, stage 3a   Baseline creatinine 1.1-1.6  Creatinine 1.56 today, from 1.40  Daily BMP ordered    Intake/Output net: (-)1403.7 mL/24 hours    Diuretic Regimen:  Start PO Torsemide, 20 mg QD  Start Potassium Chloride 20 mEq PO QD    BS Qshift w/ h/o BPH    Continue Flomax    Neuro:    Neurologically intact; No active issues     Incisional pain  well-controlled; Continue prn Tylenol    GI:    NPO for EP reeval this AM    Tolerating diet without complaint    Continue stool softeners and prn suppository    Continue GI prophylaxis    Endo:     Glucose well-controlled with insulin sliding scale coverage    7.  Hematology:     Post-operative acute blood loss anemia; Hemoglobin 11.4 from 11.2; trend prn  Leukocytosis; Afebrile with no signs of infection; Trend CBC prn    8.  Disposition:    Gerontology consultation already completed for routine assessment of TAVR patient over 65 years old    Not yet medically cleared for discharge, pending EP reeval, likely will need PPM for tachy-ren syndrome      VTE Pharmacologic Prophylaxis: Heparin  VTE Mechanical Prophylaxis: sequential compression device    Collaborative rounds completed with supervising physician  Plan of care discussed with bedside nurse    SIGNATURE: Jen Solomon PA-C  DATE: May 7, 2025  TIME: 7:03 AM

## 2025-05-07 NOTE — UTILIZATION REVIEW
Initial Clinical Review    Elective Inpatient surgical procedure  Age/Sex: 89 y.o. male  Surgery Date: 5/6/25  Procedure: Transcatheter aortic valve replacement with a 26 mm Marinelli AIMEE 3 Ultra Resilia bioprosthetic valve via a percutaneous right transfemoral approach.   Anesthesia: general endotracheal anesthesia with transesophageal echocardiogram guidance.     POD#1 Progress Note:  Stitch x1 for RIJ TLC oozing & manual pressure held w/ improvement. EP eval complete, resumed on beta blocker for frequent ectopy w/ episodes of bradycardia into the 40s overnight. Seen by geriatrics w/o changes to medical regimen made. Voiding well s/p tiwari removal. (+) flatus, (-) BM. OOB to chair, has not ambulated otherwise yet this AM. Pain controlled. Telemetry: NSR; Heart Rate: 90; intermittent episodes of bradycardia into the 40s (asymptomatic) & runs of SVT in the 100-110s (asymptomatic) overnight. Continue incentive spirometry/Coughing/Deep breathing exercises. Monitor IO, start torsemide and potassium. Continue GI ppx, stool softeners. Monitor blood sugars and start SSI. Not yet medically cleared for discharge, pending EP reeval, likely will need PPM for tachy-ren syndrome.    Admission Orders: Date/Time/Statement:   Admission Orders (From admission, onward)       Ordered        05/06/25 0856  Inpatient Admission  Once                          Orders Placed This Encounter   Procedures    Inpatient Admission     Standing Status:   Standing     Number of Occurrences:   1     Level of Care:   Level 1 Stepdown [13]     Estimated length of stay:   More than 2 Midnights     Certification:   I certify that inpatient services are medically necessary for this patient for a duration of greater than two midnights. See H&P and MD Progress Notes for additional information about the patient's course of treatment.     Diet: NPO  Mobility: OOB and ambulatory  DVT Prophylaxis: heparin, scd, ambulation    Medications/Pain Control:    Scheduled Medications:  amLODIPine, 10 mg, Oral, Daily  aspirin, 81 mg, Oral, Daily  atorvastatin, 80 mg, Oral, Daily  chlorhexidine, 15 mL, Mouth/Throat, BID  clopidogrel, 75 mg, Oral, Daily  heparin (porcine), 5,000 Units, Subcutaneous, Q8H ELSY  insulin lispro, 1-5 Units, Subcutaneous, TID AC  insulin lispro, 1-5 Units, Subcutaneous, HS  metoprolol succinate, 25 mg, Oral, Daily  mupirocin, 1 Application, Nasal, Q12H ELSY  pantoprazole, 40 mg, Oral, Daily  polyethylene glycol, 17 g, Oral, Daily  potassium chloride, 20 mEq, Oral, Daily  tamsulosin, 0.4 mg, Oral, Daily With Dinner  torsemide, 20 mg, Oral, Daily      Continuous IV Infusions: none     PRN Meds:  acetaminophen, 650 mg, Rectal, Q4H PRN  acetaminophen, 650 mg, Oral, Q4H PRN  bisacodyl, 10 mg, Rectal, Daily PRN  calcium gluconate, 2 g, Intravenous, Once PRN  hydrALAZINE, 10 mg, Intravenous, Q6H PRN  meclizine, 25 mg, Oral, Q8H PRN  ondansetron, 4 mg, Intravenous, Q6H PRN      Vital Signs (last 3 days)       Date/Time Temp Pulse Resp BP MAP (mmHg) Arterial Line BP MAP SpO2 Calculated FIO2 (%) - Nasal Cannula O2 Flow Rate (L/min) Nasal Cannula O2 Flow Rate (L/min) O2 Device Cardiac (WDL) Patient Position - Orthostatic VS Wilson Coma Scale Score Pain    05/07/25 0600 98.2 °F (36.8 °C) 66 19 130/71 94 -- -- 95 % -- -- -- -- -- -- -- --    05/07/25 0500 -- 62 21 111/54 78 -- -- 93 % -- -- -- -- -- -- -- --    05/07/25 0400 -- 66 19 130/60 86 -- -- 95 % -- -- -- -- -- -- 15 --    05/07/25 0300 -- 67 21 147/67 94 -- -- 96 % -- -- -- -- -- -- -- --    05/07/25 0200 -- 66 19 119/64 87 -- -- 94 % -- -- -- -- -- -- -- --    05/07/25 0100 -- 97 19 144/61 88 -- -- 94 % -- -- -- -- -- -- -- --    05/07/25 0030 -- 102 26 124/60 87 129/56 81 mmHg 96 % -- -- -- -- -- -- -- --    05/07/25 0000 -- 99 19 146/69 99 132/56 78 mmHg 96 % -- -- -- None (Room air) -- Lying 15 No Pain    05/06/25 2300 -- 106 17 138/64 92 135/53 78 mmHg 96 % -- -- -- -- -- -- -- --    05/06/25 2200  99.5 °F (37.5 °C) 102 17 131/66 90 141/52 77 mmHg 95 % -- -- -- -- -- -- -- --    05/06/25 2100 100.9 °F (38.3 °C) 94 19 126/58 83 125/48 70 mmHg 93 % -- -- -- -- -- -- -- --    05/06/25 2000 100.6 °F (38.1 °C) 96 28 152/67 96 141/53 81 mmHg 96 % -- -- -- None (Room air) -- -- 15 No Pain    05/06/25 1942 -- -- -- 141/52 -- -- -- -- -- -- -- -- -- -- -- --    05/06/25 1900 99.9 °F (37.7 °C) 93 28 152/74 107 157/74 101 mmHg 96 % -- -- -- None (Room air) -- Lying -- No Pain    05/06/25 1800 99.7 °F (37.6 °C) 80 22 110/61 80 106/40 59 mmHg 94 % -- -- -- None (Room air) -- Lying -- No Pain    05/06/25 1700 99.3 °F (37.4 °C) 89 18 130/71 95 124/46 70 mmHg 95 % -- -- -- None (Room air) -- Lying -- No Pain    05/06/25 1600 99 °F (37.2 °C) 87 14 112/67 85 111/46 64 mmHg 95 % -- -- -- None (Room air) -- Lying 15 No Pain    05/06/25 1500 98.6 °F (37 °C) 97 16 121/63 85 129/49 72 mmHg 95 % -- -- -- None (Room air) -- Lying -- No Pain    05/06/25 1439 -- 100 -- 118/58 -- -- -- -- -- -- -- -- -- -- -- --    05/06/25 1400 98.1 °F (36.7 °C) 102 22 120/60 84 126/50 76 mmHg 97 % -- -- -- Nasal cannula -- Lying -- No Pain    05/06/25 1330 -- -- -- -- 92 132/57 79 mmHg -- -- -- -- -- -- -- -- --    05/06/25 1315 -- -- -- 102/56 75 106/43 61 mmHg -- -- -- -- -- -- -- -- --    05/06/25 1300 -- 101 14 108/55 75 115/39 73 mmHg 94 % 28 -- 2 L/min Nasal cannula -- Lying -- No Pain    05/06/25 1230 -- 94 -- 144/56 -- -- -- -- -- -- -- -- -- -- -- --    05/06/25 1200 98.2 °F (36.8 °C) 92 15 127/57 82 126/58 80 mmHg 98 % 24 -- 1 L/min None (Room air) -- Lying 15 No Pain    05/06/25 1128 -- -- -- 128/64 -- -- -- -- -- -- -- -- -- -- -- --    05/06/25 1100 -- 94 14 107/56 74 114/48 68 mmHg 95 % 24 -- 1 L/min Nasal cannula -- Lying -- No Pain    05/06/25 1030 -- 99 12 105/58 78 103/41 60 mmHg 94 % -- -- -- None (Room air) -- Lying -- No Pain    05/06/25 1000 -- 97 13 105/55 75 96/39 58 mmHg 94 % -- -- -- None (Room air) -- Lying 15 No Pain     05/06/25 0945 -- 101 12 119/55 80 111/43 66 mmHg 95 % -- -- -- None (Room air) WDL -- 15 No Pain    05/06/25 0935 -- 102 16 146/77 -- 144/66 89 mmHg -- -- -- -- -- -- -- -- --    05/06/25 0930 97.3 °F (36.3 °C) 103 22 126/65 88 143/59 86 mmHg 95 % -- -- -- None (Room air) WDL -- 15 No Pain    05/06/25 0915 -- 96 22 122/70 88 125/54 79 mmHg 94 % -- -- -- None (Room air) -- -- 15 No Pain    05/06/25 0900 -- 92 15 111/57 82 105/47 67 mmHg 100 % -- -- -- None (Room air) -- -- 15 No Pain    05/06/25 0849 97 °F (36.1 °C) 94 -- 109/58 78 105/44 67 mmHg 100 % -- 6 L/min -- Simple mask WDL -- 14 No Pain    05/06/25 0539 97.2 °F (36.2 °C) 54 18 141/77 -- -- -- 100 % -- -- -- None (Room air) -- -- -- No Pain          Weight (last 2 days)       Date/Time Weight    05/07/25 0544 67.3 (148.37)    05/06/25 1230 68 (150)    05/06/25 0539 68.3 (150.6)            Pertinent Labs/Diagnostic Test Results:   Radiology:  XR chest portable ICU   Final Interpretation by Shiva Myers MD (05/06 1207)      Pulmonary vascular congestion and mild interstitial edema. No alveolar edema or effusion.            Resident: Hiram Crowe I, the attending radiologist, have reviewed the images and agree with the final report above.      Workstation performed: [id number]         XR chest portable    (Results Pending)     Cardiology:  Echo complete w/ contrast if indicated   Final Result by Nicol Rain MD (05/06 8130)        Left Ventricle: Left ventricular cavity size is normal. Wall thickness    is mildly increased. There is mild concentric hypertrophy. The left    ventricular ejection fraction is 55% by visual estimation. Systolic    function is normal. Wall motion is normal. Diastolic function is mildly    abnormal, consistent with grade I (abnormal) relaxation.     Aortic Valve: There is an Marinelli AIMEE 3 Ultra Resilia 26 mm TAVR    bioprosthetic valve. There is no evidence of paravalvular regurgitation.    There is no evidence of  transvalvular regurgitation. The gradient recorded    across the prosthetic aortic valve is within the expected range. AV mean    gradient is 6 mmHg. DVI is 0.70. AV valve area is 2.19 cm2.     Aorta: The aortic root is normal in size. The ascending aorta is mildly    dilated. Ao root is 2.70 cm. Asc Ao is 3.9 cm.         ECG 12 lead   Final Result by Dedra Ceron MD (05/06 6904)   Normal sinus rhythm   Left bundle branch block   Abnormal ECG   When compared with ECG of 25-Apr-2025 13:45,   Premature ventricular complexes are no longer Present   Left bundle branch block is now Present   Confirmed by Dedra Ceron ([id number]) on 5/6/2025 5:29:19 PM        GI:  No orders to display           Results from last 7 days   Lab Units 05/07/25 0405 05/06/25  0857 05/06/25  0845 05/06/25  0825 05/06/25  0748 05/02/25  1108   WBC Thousand/uL 11.26*  --   --   --   --  7.26   HEMOGLOBIN g/dL 11.4* 11.2*  --   --   --  12.1   I STAT HEMOGLOBIN g/dl  --   --  9.9* 9.9* 11.9*  --    HEMATOCRIT % 35.3* 35.0*  --   --   --  38.8   HEMATOCRIT, ISTAT %  --   --  29* 29* 35*  --    PLATELETS Thousands/uL 187 177  --   --   --  243   TOTAL NEUT ABS Thousands/µL  --   --   --   --   --  4.12         Results from last 7 days   Lab Units 05/07/25 0405 05/06/25  0857 05/06/25  0845 05/06/25  0825 05/06/25  0748 05/02/25  1108   SODIUM mmol/L 140 139  --   --   --  140   POTASSIUM mmol/L 3.9 3.9  --   --   --  4.3   CHLORIDE mmol/L 107 111*  --   --   --  108   CO2 mmol/L 23 21  --   --   --  22   CO2, I-STAT mmol/L  --   --  21 20* 24  --    ANION GAP mmol/L 10 7  --   --   --  10   BUN mg/dL 20 21  --   --   --  22   CREATININE mg/dL 1.56* 1.40*  --   --   --  1.33*   EGFR ml/min/1.73sq m 39 44  --   --   --  47   CALCIUM mg/dL 8.3* 8.0*  --   --   --  8.9   CALCIUM, IONIZED, ISTAT mmol/L  --   --  1.11* 1.10* 1.19  --    MAGNESIUM mg/dL 1.9  --   --   --   --   --      Results from last 7 days   Lab Units 05/02/25  1108   AST U/L 30    ALT U/L 55*   ALK PHOS U/L 111*   TOTAL PROTEIN g/dL 6.3*   ALBUMIN g/dL 3.7   TOTAL BILIRUBIN mg/dL 0.56     Results from last 7 days   Lab Units 05/07/25  0612 05/06/25  2104 05/06/25  1538 05/06/25  1042 05/06/25  0857   POC GLUCOSE mg/dl 130 115 106 114 127     Results from last 7 days   Lab Units 05/07/25  0405 05/06/25  0857   GLUCOSE RANDOM mg/dL 107 123       Results from last 7 days   Lab Units 05/06/25  0845 05/06/25  0825 05/06/25  0748   I STAT BASE EXC mmol/L -6* -6* -3*   I STAT O2 SAT % 100* 98* 84   ISTAT PH ART  7.314* 7.339* 7.345*   I STAT ART PCO2 mm HG 39.0 35.6* 41.2   I STAT ART PO2 mm .0* 105.0 51.0*   I STAT ART HCO3 mmol/L 19.8* 19.2* 22.5       Results from last 7 days   Lab Units 05/02/25  1108   PROTIME seconds 14.2   INR  1.07       Results from last 7 days   Lab Units 05/06/25  0857   UNIT PRODUCT CODE  [id number]  [id number]  [id number]  [id number]   UNIT NUMBER  [id number]  [id number]  [id number]  [id number]   UNITABO  B  B  B  B   UNITRH  POS  POS  POS  POS   CROSSMATCH  Compatible  Compatible  Compatible  Compatible   UNIT DISPENSE STATUS  Crossmatched  Crossmatched  Crossmatched  Crossmatched   UNIT PRODUCT VOL mL 350  350  350  350         Network Utilization Review Department  ATTENTION: Please call with any questions or concerns to [phone] and carefully listen to the prompts so that you are directed to the right person. All voicemails are confidential.   For Discharge needs, contact Care Management DC Support Team at [phone] opt. [phone]  Send all requests for admission clinical reviews, approved or denied determinations and any other requests to dedicated fax number below belonging to the campus where the patient is receiving treatment. List of dedicated fax numbers for the Facilities:  FACILITY NAME UR FAX NUMBER   ADMISSION DENIALS (Administrative/Medical Necessity) [phone]   DISCHARGE SUPPORT TEAM (NETWORK) [phone]    PARENT CHILD HEALTH (Maternity/NICU/Pediatrics) [phone]   [facility] [phone]   [facility] [phone]   [facility] [phone]   [facility] [phone]   [facility] [phone]   [facility] [phone]   [facility] [phone]   [facility] [phone]   [facility] [phone]   [facility] [phone]   [facility] [phone]   [facility] [phone]

## 2025-05-07 NOTE — PHYSICAL THERAPY NOTE
Physical Therapy Evaluation     Patient's Name: Christian Jones    Admitting Diagnosis  Aortic valve stenosis, etiology of cardiac valve disease unspecified [I35.0]    Problem List  Patient Active Problem List   Diagnosis    Rectal bleeding    CKD (chronic kidney disease) stage 3, GFR 30-59 ml/min ([facility])    Depression, recurrent (HCC)    Peripheral vascular disease ([facility])    Aortic stenosis    Dyspnea on exertion    Essential hypertension    Stroke-like symptoms    Abnormal EKG    NSVT (nonsustained ventricular tachycardia) ([facility])    Pulmonary edema    Coronary artery disease involving native coronary artery    CKD stage 3a, GFR 45-59 ml/min ([facility])    Benign hypertension with chronic kidney disease, stage III (HCC)    At risk for electrolyte imbalance    Anemia    S/p TAVR (transcatheter aortic valve replacement), bioprosthetic    New onset left bundle branch block (LBBB)    Frailty    Encounter for support to caregiver    At risk for falls    BPH (benign prostatic hyperplasia)    PAC (premature atrial contraction)    SVT (supraventricular tachycardia) ([facility])    Bigeminy       Past Medical History  Past Medical History:   Diagnosis Date    Aortic valve, bicuspid 12/1/2024    Arthritis 1/2000    Basal cell carcinoma     Head    Depression 1/1990    Ear problems 1980    GERD (gastroesophageal reflux disease) 1/1990    Heart murmur long standing    Hyperlipemia     Hypertension     Positive fecal occult blood test     Psoriasis     Rectal bleeding     Tinnitus 1980    continuing    Visual impairment 1/1950    corrected       Past Surgical History  Past Surgical History:   Procedure Laterality Date    BASAL CELL CARCINOMA EXCISION      CARDIAC CATHETERIZATION N/A 4/15/2025    Procedure: Cardiac Coronary Angiogram;  Surgeon: Marlene Carney DO;  Location: [facility];  Service: Cardiology    CARDIAC CATHETERIZATION N/A 4/25/2025    Procedure: Cardiac PCI;  Surgeon: Marlene Carney DO;  Location: [facility];  Service: Cardiology    CARDIAC CATHETERIZATION N/A 4/25/2025    Procedure: Cardiac PCI Stent;  Surgeon: Marlene Carney DO;  Location: [facility];  Service: Cardiology    CARDIAC CATHETERIZATION N/A 5/6/2025    Procedure: Cardiac tavr;  Surgeon: Jered Mckeon MD;  Location: [facility];  Service: Cardiology    COLONOSCOPY      COLONOSCOPY  03/28/2019    HERNIA REPAIR  2012    Inguinal    LIPOMA RESECTION      Scalp    VA REPLACE AORTIC VALVE OPENFEMORAL ARTERY APPROACH N/A 5/6/2025    Procedure: REPLACEMENT AORTIC VALVE TRANSCATHETER (TAVR) TRANSFEMORAL W/ 26MM VALVE(ACCESS ON RIGHT) HIEN;  Surgeon: DIMAS Caballero MD;  Location: [facility];  Service: Cardiac Surgery    SIGMOIDOSCOPY            05/07/25 0926   PT Last Visit   PT Visit Date 05/07/25   Note Type   Note type Evaluation   Pain Assessment   Pain Assessment Tool 0-10   Pain Score No Pain   Restrictions/Precautions   Braces or Orthoses   (denies)   Other Precautions Cardiac/sternal;Chair Alarm;Telemetry;Impulsive;Hard of hearing   Home Living   Type of Home House   Home Layout One level  (2 CHAR w/ hand rail)   Home Equipment Walker;Cane;Other (Comment)  (rollator)   Prior Function   Level of [place] Independent with functional mobility  (amb w/ SPC as needed)   Lives With Spouse  (unable to (A), as per pt)   General   Additional Pertinent History cleared for assessment by nsg   Cognition   Overall Cognitive Status WFL   Arousal/Participation Cooperative   Orientation Level Oriented to person;Oriented to place;Oriented to situation   Memory Decreased recall of precautions   Following Commands Follows one step commands with increased time or repetition  ([place])   Subjective   Subjective Alert; in the chair; agreeable to mobilize   RUE Assessment   RUE Assessment WFL  (AROM)   LUE Assessment   LUE Assessment WFL  (AROM)   RLE Assessment   RLE Assessment WFL  (AROM)   Strength RLE   RLE Overall Strength   (fair +/ good -)   LLE  Assessment   LLE Assessment WFL  (AROM)   Strength LLE   LLE Overall Strength   (fair +/ good -)   Transfers   Sit to Stand 5  Supervision   Additional items Verbal cues   Stand to Sit 5  Supervision   Additional items Verbal cues   Ambulation/Elevation   Gait pattern Short stride;Inconsistent whit   Gait Assistance 4  Minimal assist   Additional items Assist x 1;Verbal cues;Tactile cues   Assistive Device Rolling walker   Distance 100 ft   Stair Management Assistance Not tested   Balance   Static Sitting Fair +   Dynamic Sitting Fair   Static Standing Fair -   Dynamic Standing Fair -   Ambulatory Poor +   Activity Tolerance   Activity Tolerance Patient limited by fatigue   Medical Staff Made Aware Co-eval performed w/ OTR due to complexity of medical status and multiple comorbidities   Nurse Made Aware spoke to SEGUNDO Hurtado   Assessment   Prognosis Good   Problem List Decreased strength;Decreased endurance;Impaired balance;Decreased mobility;Decreased safety awareness;Impaired hearing   Assessment Pt is 89  y.o. male admitted with Dx of Symptomatic severe aortic stenosis and underwent Transcatheter aortic valve replacement with a 26 mm Marinelli AIMEE 3 Ultra Resilia bioprosthetic valve via a percutaneous right transfemoral approach on 5/6/2025. Postop course included: Left bundle branch block; EP consulted and following; PPM placement is being considered. Pt 's comorbidities affecting POC include: Arthritis (1/2000), Depression, CAD, CKD, anemia, NSVT, Hypertension, and Visual impairment, and personal factors of: advanced age and CHAR. Pt's clinical presentation is currently unstable/unpredictable which is evident in ongoing telem monitoring, abn lab values and ongoing postop management w/ EP following and possible PPM pending. Pt presents w/ min overall weakness, decreased functional endurance and inconsistent amb balance and gait patterns but w/ overall observed mobility status appearing to be near premorbid  level. Will cont to follow pt in PT for progressive mobilization to max level of (I), endurance, and safety. Otherwise, anticipate pt will return home w/ available family support upon D/C pending additional progress and when medically cleared; D/C recommendations are outlined below. Will cont to follow until then.   Goals   Patient Goals to go home   STG Expiration Date 05/12/25   Short Term Goal #1 3-5 days. Pt will amb 150 ft w/ rw <--> SPC, mod (I) in order to facilitate safe return to premorbid environment and to initiate return to community amb status. Pt will negotiate 2 steps w/ hand rail and SPC, mod (I) in order to assure safe navigation in and out of the premorbid living environment. Pt will achieve mod (I) level w/ bed mob in order to facilitate safety with OOB and back to bed transitions in own living environment. Pt will perform transfers w/ mod (I) to assure (I) and safety w/ functional mobility/transitions w/ all aspects of mobility/locomotion. Pt will participate in LE therex and balance activities to max progression w/ mobility skills.   PT Treatment Day 0   Plan   Treatment/Interventions Functional transfer training;LE strengthening/ROM;Elevations;Therapeutic exercise;Endurance training;Equipment eval/education;Bed mobility;Gait training;Spoke to nursing;OT   PT Frequency 3-5x/wk   Discharge Recommendation   Rehab Resource Intensity Level, PT III (Minimum Resource Intensity)   Equipment Recommended Walker  (at this time)   AM-PAC Basic Mobility Inpatient   Turning in Flat Bed Without Bedrails 4   Lying on Back to Sitting on Edge of Flat Bed Without Bedrails 3   Moving Bed to Chair 3   Standing Up From Chair Using Arms 3   Walk in Room 3   Climb 3-5 Stairs With Railing 3   Basic Mobility Inpatient Raw Score 19   Basic Mobility Standardized Score 42.48   [facility] Highest Level Of Mobility   -HLM Goal 6: Walk 10 steps or more   -HLM Achieved 7: Walk 25 feet or more   Modified Mobile Scale    Modified Ware Scale 4   End of Consult   Patient Position at End of Consult Bedside chair;Bed/Chair alarm activated;All needs within reach         Bruno Art, PT

## 2025-05-08 ENCOUNTER — HOME HEALTH ADMISSION (OUTPATIENT)
Dept: HOME HEALTH SERVICES | Facility: HOME HEALTHCARE | Age: 89
End: 2025-05-08
Payer: COMMERCIAL

## 2025-05-08 ENCOUNTER — APPOINTMENT (INPATIENT)
Dept: RADIOLOGY | Facility: HOSPITAL | Age: 89
DRG: 267 | End: 2025-05-08
Payer: MEDICARE

## 2025-05-08 VITALS
SYSTOLIC BLOOD PRESSURE: 118 MMHG | DIASTOLIC BLOOD PRESSURE: 59 MMHG | OXYGEN SATURATION: 95 % | WEIGHT: 146.2 LBS | HEIGHT: 65 IN | HEART RATE: 93 BPM | RESPIRATION RATE: 25 BRPM | TEMPERATURE: 98.2 F | BODY MASS INDEX: 24.36 KG/M2

## 2025-05-08 LAB
ANION GAP SERPL CALCULATED.3IONS-SCNC: 11 MMOL/L (ref 4–13)
BUN SERPL-MCNC: 22 MG/DL (ref 5–25)
CALCIUM SERPL-MCNC: 8.3 MG/DL (ref 8.4–10.2)
CHLORIDE SERPL-SCNC: 107 MMOL/L (ref 96–108)
CO2 SERPL-SCNC: 21 MMOL/L (ref 21–32)
CREAT SERPL-MCNC: 1.36 MG/DL (ref 0.6–1.3)
ERYTHROCYTE [DISTWIDTH] IN BLOOD BY AUTOMATED COUNT: 14.4 % (ref 11.6–15.1)
GFR SERPL CREATININE-BSD FRML MDRD: 46 ML/MIN/1.73SQ M
GLUCOSE SERPL-MCNC: 106 MG/DL (ref 65–140)
GLUCOSE SERPL-MCNC: 125 MG/DL (ref 65–140)
GLUCOSE SERPL-MCNC: 128 MG/DL (ref 65–140)
GLUCOSE SERPL-MCNC: 185 MG/DL (ref 65–140)
HCT VFR BLD AUTO: 37.9 % (ref 36.5–49.3)
HGB BLD-MCNC: 12.4 G/DL (ref 12–17)
INR PPP: 1.19 (ref 0.85–1.19)
MCH RBC QN AUTO: 29.2 PG (ref 26.8–34.3)
MCHC RBC AUTO-ENTMCNC: 32.7 G/DL (ref 31.4–37.4)
MCV RBC AUTO: 89 FL (ref 82–98)
PLATELET # BLD AUTO: 163 THOUSANDS/UL (ref 149–390)
PMV BLD AUTO: 11.1 FL (ref 8.9–12.7)
POTASSIUM SERPL-SCNC: 3.8 MMOL/L (ref 3.5–5.3)
PROTHROMBIN TIME: 15.4 SECONDS (ref 12.3–15)
RBC # BLD AUTO: 4.24 MILLION/UL (ref 3.88–5.62)
SODIUM SERPL-SCNC: 139 MMOL/L (ref 135–147)
WBC # BLD AUTO: 13.24 THOUSAND/UL (ref 4.31–10.16)

## 2025-05-08 PROCEDURE — NC001 PR NO CHARGE: Performed by: THORACIC SURGERY (CARDIOTHORACIC VASCULAR SURGERY)

## 2025-05-08 PROCEDURE — 82948 REAGENT STRIP/BLOOD GLUCOSE: CPT

## 2025-05-08 PROCEDURE — 71046 X-RAY EXAM CHEST 2 VIEWS: CPT

## 2025-05-08 PROCEDURE — 85027 COMPLETE CBC AUTOMATED: CPT | Performed by: PHYSICIAN ASSISTANT

## 2025-05-08 PROCEDURE — 99024 POSTOP FOLLOW-UP VISIT: CPT | Performed by: PHYSICIAN ASSISTANT

## 2025-05-08 PROCEDURE — 99238 HOSP IP/OBS DSCHRG MGMT 30/<: CPT | Performed by: PHYSICIAN ASSISTANT

## 2025-05-08 PROCEDURE — 85610 PROTHROMBIN TIME: CPT | Performed by: PHYSICIAN ASSISTANT

## 2025-05-08 PROCEDURE — 80048 BASIC METABOLIC PNL TOTAL CA: CPT | Performed by: PHYSICIAN ASSISTANT

## 2025-05-08 RX ORDER — METOPROLOL SUCCINATE 25 MG/1
50 TABLET, EXTENDED RELEASE ORAL DAILY
Start: 2025-05-08 | End: 2025-05-12 | Stop reason: SDUPTHER

## 2025-05-08 RX ORDER — METOPROLOL SUCCINATE 50 MG/1
50 TABLET, EXTENDED RELEASE ORAL DAILY
Status: DISCONTINUED | OUTPATIENT
Start: 2025-05-09 | End: 2025-05-08 | Stop reason: HOSPADM

## 2025-05-08 RX ORDER — METOPROLOL SUCCINATE 25 MG/1
25 TABLET, EXTENDED RELEASE ORAL ONCE
Status: COMPLETED | OUTPATIENT
Start: 2025-05-08 | End: 2025-05-08

## 2025-05-08 RX ADMIN — AMLODIPINE BESYLATE 10 MG: 10 TABLET ORAL at 08:24

## 2025-05-08 RX ADMIN — ACETAMINOPHEN 650 MG: 325 TABLET, FILM COATED ORAL at 05:56

## 2025-05-08 RX ADMIN — PANTOPRAZOLE SODIUM 40 MG: 40 TABLET, DELAYED RELEASE ORAL at 08:24

## 2025-05-08 RX ADMIN — POTASSIUM CHLORIDE 20 MEQ: 1500 TABLET, EXTENDED RELEASE ORAL at 08:24

## 2025-05-08 RX ADMIN — POLYETHYLENE GLYCOL 3350 17 G: 17 POWDER, FOR SOLUTION ORAL at 08:24

## 2025-05-08 RX ADMIN — HYDRALAZINE HYDROCHLORIDE 10 MG: 20 INJECTION, SOLUTION INTRAMUSCULAR; INTRAVENOUS at 01:27

## 2025-05-08 RX ADMIN — METOPROLOL SUCCINATE 25 MG: 25 TABLET, EXTENDED RELEASE ORAL at 09:09

## 2025-05-08 RX ADMIN — CLOPIDOGREL BISULFATE 75 MG: 75 TABLET, FILM COATED ORAL at 08:24

## 2025-05-08 RX ADMIN — MUPIROCIN 1 APPLICATION: 20 OINTMENT TOPICAL at 08:24

## 2025-05-08 RX ADMIN — ASPIRIN 81 MG CHEWABLE TABLET 81 MG: 81 TABLET CHEWABLE at 08:24

## 2025-05-08 RX ADMIN — ATORVASTATIN CALCIUM 80 MG: 80 TABLET, FILM COATED ORAL at 08:24

## 2025-05-08 RX ADMIN — CHLORHEXIDINE GLUCONATE 15 ML: 1.2 SOLUTION ORAL at 08:24

## 2025-05-08 RX ADMIN — METOPROLOL SUCCINATE 25 MG: 25 TABLET, EXTENDED RELEASE ORAL at 08:24

## 2025-05-08 RX ADMIN — TORSEMIDE 20 MG: 20 TABLET ORAL at 08:24

## 2025-05-08 RX ADMIN — HEPARIN SODIUM 5000 UNITS: 5000 INJECTION, SOLUTION INTRAVENOUS; SUBCUTANEOUS at 05:25

## 2025-05-08 NOTE — DISCHARGE SUMMARY
Discharge Summary - Cardiac Surgery   Christian Jones 89 y.o. male MRN: [id number]  Unit/Bed#: PPHP-306-01 Encounter: [id number]    Admission Date: 5/6/2025     Discharge Date: 05/08/25    Admitting Diagnosis:   Aortic valve stenosis, etiology of cardiac valve disease unspecified [I35.0]    Primary Discharge Diagnosis:   Severe AS S/P TF TAVR    Secondary Discharge Diagnosis:   AS, CAD (PCI w/ TRUNG x 3 mLM-pLAD, mLAD x 1 POBA to oLcx jailed by prior TRUNG, pLAD-mLAD 70%), HTN, HLD, CKD3a, BCC with excision and flap on his scalp (patient states in the 90's), arthritis, h/o rectal bleeding, DRAGAN, PVD, Psoriasis, High grade stenosis of right vertebral artery, moderate bilateral ICA stenosis (right 50-60%, left 60-70%), preDM2 (A1c 6.0), NSVT/PACs/PVCs  - post op new LBBB/intermittent CHB requiring PPM    Attending: Jered Caballero M.D.    Consulting Physician(s):   Electrophysiology  Gerontology    Procedures Performed:   Procedure(s):  Cardiac pacer implant     Hospital Course:   The patient was seen in consultation prior to this admission for evaluation of severe AS. Risks and benefits of transfemoral transcatheter aortic valve replacement were discussed in detail, and patient was agreeable.  Routine preoperative evaluation was completed and informed consent was obtained prior to admission.    5/6: Elective admission for TF TAVR # 26 mm via R approach. Extubated and transferred to PACU supported with no gtts. DP pulses 2+ b/l. Restarted on home ASA, Plavix, amlodipine 10 mg QD, atorvastatin 80 mg, Flomax 0.4 mg QD (equivalent to home med- alfuzosin), and meclizine. ECG shows SR w/ new LBBB, hold home Toprol 25 mg QD, consult EP, repeat EKG for 1200 ordered. Gerontology and cardiology consulted. PM: Oozing from RIJ TLC site, not alleviated from manual pressure or w/ Avetine, RIJ suture x1 placed w/ improvement. RN at bedside to redress central line. Restart Toprol 25mg (home dose) per EP.     5/7: Tachy-ren  overnight w/ rates from 40s-110s, asymptomatic, NPO for EP reeval this AM, EKG w/ new 1st AVB & persistent LBBB. Net (-) 1403.67cc/24hrs w/ persistent PVC on CXR, start Torsemide 20mg QDay. Cr at baseline. TTE reported. Geriatrics eval complete. Transfer to telem. Evaluated by EP - plan for PPM insertion today. Hold Heparin and Metoprolol with plan to resume post procedure. PM: toprol and heparin resumed     5/8: A sense v-pacing with rates 90-120s. Has frequent PACs/PVCs which is baseline. SBP 130s-140s. On RA. Hgb 12.4, Plts 163, Cr 1.36 (1.56). Right groin site stable. PPM site covered with dressing. Neuro/vasc intact. No complaints. Passing urine/bowels. Eating/drinking well. 2VCXR clear. Increase toprol XL to 50mg daily. Cont norvasc 5mg daily. device interrogation completed and EP clearance obtained, discharge home.      Condition at Discharge:   good     Discharge Physical Exam:    Please see the documented physical exam from this morning's progress note for details.    Discharge Data:  Results from last 7 days   Lab Units 05/08/25 0525 05/07/25 0405 05/06/25  0857 05/06/25  0748 05/02/25  1108   WBC Thousand/uL 13.24* 11.26*  --   --  7.26   HEMOGLOBIN g/dL 12.4 11.4* 11.2*  --  12.1   I STAT HEMOGLOBIN   --   --   --    < >  --    HEMATOCRIT % 37.9 35.3* 35.0*  --  38.8   HEMATOCRIT, ISTAT   --   --   --    < >  --    PLATELETS Thousands/uL 163 187 177  --  243    < > = values in this interval not displayed.     Results from last 7 days   Lab Units 05/08/25 0525 05/07/25  0405 05/06/25  0857 05/06/25  0845   POTASSIUM mmol/L 3.8 3.9 3.9  --    CHLORIDE mmol/L 107 107 111*  --    CO2 mmol/L 21 23 21  --    CO2, I-STAT mmol/L  --   --   --  21   BUN mg/dL 22 20 21  --    CREATININE mg/dL 1.36* 1.56* 1.40*  --    GLUCOSE, ISTAT mg/dl  --   --   --  107   CALCIUM mg/dL 8.3* 8.3* 8.0*  --      Results from last 7 days   Lab Units 05/08/25  0525 05/02/25  1108   INR  1.19 1.07       Discharge  instructions/Information to patient and family:   See after visit summary for information provided to patient and family.      Christian Jones was educated on restrictions regarding driving and lifting, and techniques of proper incisional care.  They were specifically counselled on signs and symptoms of an incisional infection, and advised to contact our service immediately should they develop fevers, sweats, chill, redness or drainage at the site of any incisions.    Provisions for Follow-Up Care:  See after visit summary for information related to follow-up care and any pertinent home health orders.      Disposition:  Home    Planned Readmission:   No    Discharge Medications:  See after visit summary for reconciled discharge medications provided to patient and family.      Christian Jones was provided contact information and scheduled a follow up appointment with Jered Caballero M.D.  Additionally, follow up appointments have been scheduled for their primary care physician and primary cardiologist.  Contact information was provided.      Christian Jones was counseled on the importance of avoiding tobacco products.  As with all patients whom have undergone open heart surgery, tobacco cessation medication was contraindicated at the time of discharge.     ACE/ARB was rContraindicated secondary to renal dysfunction    Beta Blocker was resumed/Prescribed at discharge    Aspirin was resumed/Prescribed at discharge    Statin was resumed/Prescribed at discharge    Plavix was resumed/Prescribed at discharge for previous PCI/TRUNG      The patient was not discharged on ongoing diuretic therapy as EF is normal, euvolemic, and not on diuretics preop    The patient was informed that following their postoperative surgical evaluation, they will be referred to outpatient cardiac rehabilitation.  They were counseled that this program is run by specialists who will help them safely strengthen their heart and prevent more  heart disease.  Cardiac rehabilitation will include exercise, relaxation, stress management, and heart-healthy nutrition.  Caregivers will also check to make sure their medication regimen is working.    During this admission, the patient was questioned on their use of tobacco, alcohol, and illicit/non-prescription drug use in the  previous 24 months. Christian Robert states that they have not used any of these substances in this time frame.    I spent 30 minutes discharging the patient. This time was spent on the day of discharge. I had direct contact with the patient on the day of discharge. Additional documentation is required if more than 30 minutes were spent on discharge.     SIGNATURE: Boby Aguilar PA-C  DATE: May 8, 2025  TIME: 10:30 AM

## 2025-05-08 NOTE — RESTORATIVE TECHNICIAN NOTE
Restorative Technician Note      Patient Name: Christian Jones     Restorative Tech Visit Date: 05/08/25  Note Type: Mobility  Patient Position Upon Consult: Bedside chair  Activity Performed: Ambulated  Assistive Device: Roller walker  Patient Position at End of Consult: All needs within reach; Other (comment) (on the stretcher)

## 2025-05-08 NOTE — PROGRESS NOTES
Progress Note - Cardiac Surgery   Christian Jones 89 y.o. male MRN: [id number]  Unit/Bed#: PPHP-306-01 Encounter: [id number]    Severe AS S/P TF TAVR POD # 2  POD 1 DC PPM     24 Hour Events: s/p PPM yest for new LBBB with intermittent CHB/tachybrady. A sense v-pacing with rates 90-120s. Has frequent PACs/PVCs which is baseline. SBP 130s-140s. On RA. Hgb 12.4, Plts 163, Cr 1.36 (1.56). Right groin site stable. PPM site covered with dressing. No complaints. Passing urine/bowels. Eating/drinking well.     Medications:   Scheduled Meds:  Current Facility-Administered Medications   Medication Dose Route Frequency Provider Last Rate    acetaminophen  650 mg Rectal Q4H PRN Lis Mcclelland PA-C      acetaminophen  650 mg Oral Q4H PRN Lis Mcclelland PA-C      amLODIPine  10 mg Oral Daily Lis Mcclelland PA-C      aspirin  81 mg Oral Daily Lis Mcclelland PA-C      atorvastatin  80 mg Oral Daily Lis Mcclelland PA-C      bisacodyl  10 mg Rectal Daily PRN Lis Mcclelland PA-C      calcium gluconate  2 g Intravenous Once PRN Lis Mcclelland PA-C      chlorhexidine  15 mL Mouth/Throat BID Lis Mcclelland PA-C      clopidogrel  75 mg Oral Daily Lis Mcclelland PA-C      heparin (porcine)  5,000 Units Subcutaneous Q8H ELSY Lis Mcclelland PA-C      hydrALAZINE  10 mg Intravenous Q6H PRN Lis Mcclelland PA-C      insulin lispro  1-5 Units Subcutaneous TID AC Lis Mcclelland PA-C      insulin lispro  1-5 Units Subcutaneous HS Lis Mcclelland PA-C      lactated ringers  20 mL/hr Intravenous Continuous Lis Mcclelland PA-C      meclizine  25 mg Oral Q8H PRN Lis Mcclelland PA-C      metoprolol succinate  25 mg Oral Daily Lis Mcclelland PA-C      mupirocin  1 Application Nasal Q12H [facility] Lis Mcclelland PA-C      ondansetron  4 mg Intravenous Q6H PRN Lis Mcclelland PA-C      pantoprazole  40 mg Oral Daily Lis Mcclelland PA-C      polyethylene glycol  17 g  Oral Daily Lis Mcclelland PA-C      potassium chloride  20 mEq Oral Daily Lis Mcclelland PA-C      tamsulosin  0.4 mg Oral Daily With Dinner Lis Mcclelland PA-C      torsemide  20 mg Oral Daily Lis Mcclelland PA-C       Continuous Infusions:lactated ringers, 20 mL/hr      PRN Meds:.  acetaminophen    acetaminophen    bisacodyl    calcium gluconate    hydrALAZINE    meclizine    ondansetron    Vitals:   Vitals:    05/08/25 0500 05/08/25 0539 05/08/25 0600 05/08/25 0700   BP: 130/64  144/67 138/66   BP Location:    Left arm   Pulse: 104  (!) 116 (!) 116   Resp: 15  (!) 25 (!) 25   Temp:  99.5 °F (37.5 °C)  98.2 °F (36.8 °C)   TempSrc:  Oral  Oral   SpO2: 96%  98% 96%   Weight:  66.3 kg (146 lb 3.2 oz)     Height:         Bp x24hrs: 110-150/50-70    Telemetry: NSR; Heart Rate: 90; intermittent episodes of bradycardia into the 40s (asymptomatic) & runs of SVT in the 100-110s (asymptomatic) overnight    Respiratory:   SpO2: SpO2: 96 %, SpO2 Activity: SpO2 Activity: At Rest, SpO2 Device: O2 Device: None (Room air); Room Air    Intake/Output:     Intake/Output Summary (Last 24 hours) at 5/8/2025 0856  Last data filed at 5/8/2025 0700  Gross per 24 hour   Intake 900 ml   Output 1840 ml   Net -940 ml      UOP - 200cc/8hrs; 2950cc/24hrs    Weights:   Weight (last 2 days)       Date/Time Weight    05/08/25 0539 66.3 (146.2)    05/07/25 0544 67.3 (148.37)    05/06/25 1230 68 (150)    05/06/25 0539 68.3 (150.6)          Admit weight: 68.3kg - down 1kg from admission weight    Results:   Results from last 7 days   Lab Units 05/08/25  0525 05/07/25  0405 05/06/25  0857 05/06/25  0748 05/02/25  1108   WBC Thousand/uL 13.24* 11.26*  --   --  7.26   HEMOGLOBIN g/dL 12.4 11.4* 11.2*  --  12.1   I STAT HEMOGLOBIN   --   --   --    < >  --    HEMATOCRIT % 37.9 35.3* 35.0*  --  38.8   HEMATOCRIT, ISTAT   --   --   --    < >  --    PLATELETS Thousands/uL 163 187 177  --  243    < > = values in this interval not  displayed.     Results from last 7 days   Lab Units 05/08/25  0525 05/07/25  0405 05/06/25  0857 05/06/25  0845   POTASSIUM mmol/L 3.8 3.9 3.9  --    CHLORIDE mmol/L 107 107 111*  --    CO2 mmol/L 21 23 21  --    CO2, I-STAT mmol/L  --   --   --  21   BUN mg/dL 22 20 21  --    CREATININE mg/dL 1.36* 1.56* 1.40*  --    GLUCOSE, ISTAT mg/dl  --   --   --  107   CALCIUM mg/dL 8.3* 8.3* 8.0*  --      Results from last 7 days   Lab Units 05/08/25  0525 05/02/25  1108   INR  1.19 1.07     Recent Labs     05/07/25  0405   MG 1.9     Point of care glucose: 130 - 115 - 106 - 114 - 127 - 163    Studies:  2VCXR: 5/8  FINDINGS: Dual lead left-sided conduction device is present with one lead overlying the rightatrial region and other lead overlying the rightventricular region. Leads intact. TAVR.  Clear lungs. No pneumothorax or pleural effusion.  Normal cardiomediastinal silhouette.  Bones are unremarkable for age.  Normal upper abdomen.  IMPRESSION:  No acute cardiopulmonary disease.    ECG: NSR w/ PACs/bigeminy, rate 63, 1st AVB ( from 198 immediately post-op from 202 pre-op), LBBB w/  from 144 immediately post-op & 70 pre-op    CXR: persistent mild to moderate central PVC, stable bibasilar atelectasis, no gross PTX or effusion, TLC in place    Echocardiogram: EF 55%, grade 1 DD, TAVR well seated w/o malfunction or PVL (mean 6, TESSIE 2.19cm2)    Results Review Statement: I personally reviewed the following image studies in PACS and associated radiology reports: EKG, chest xray, and Echocardiogram. My interpretation of the radiology images/reports is: as above.    Invasive Lines/Tubes:  Invasive Devices       Central Venous Catheter Line  Duration             CVC Central Lines 05/06/25 2 days              Peripheral Intravenous Line  Duration             Peripheral IV 05/06/25 Right Hand 2 days    Peripheral IV 05/07/25 Left Antecubital <1 day                    Physical Exam:    General: No acute  distress  HEENT/NECK:  Normocephalic. Atraumatic.  No jugular venous distention.    Cardiac: Regular rate and rhythm and No murmurs/rubs/gallops  Pulmonary:  good inspiratory effort, equal expansion b/l, (+) inspiratory rales today  Abdomen:  Non-tender, Non-distended, and Normal bowel sounds  Incisions: Groin puncture sites dressed with sterile dressing.  No hematoma, erythema, or drainage  Extremities: Extremities warm/dry, DP pulses palpable bilaterally, and Trace edema B/L  Neuro: Alert and oriented X 3  Skin: Warm/Dry, without rashes or lesions.    Assessment:  Principal Problem:    Aortic stenosis  Active Problems:    CKD (chronic kidney disease) stage 3, GFR 30-59 ml/min ([facility])    Peripheral vascular disease ([facility])    NSVT (nonsustained ventricular tachycardia) ([facility])    Coronary artery disease involving native coronary artery    CKD stage 3a, GFR 45-59 ml/min ([facility])    Benign hypertension with chronic kidney disease, stage III ([facility])    Anemia    S/p TAVR (transcatheter aortic valve replacement), bioprosthetic    New onset left bundle branch block (LBBB)    Frailty    Encounter for support to caregiver    At risk for falls    BPH (benign prostatic hyperplasia)    PAC (premature atrial contraction)    SVT (supraventricular tachycardia) ([facility])    Bigeminy       Severe AS S/P TF TAVR POD # 2  POD 1 DC PPM     Plan:    Cardiac:     Normal ventricular systolic function, EF 55%    New post op LBB with int CHB/tachy ren  S/P DC PPM 5/7  2VCXR stable  Await device interrogation/EP clearance    A-sense V-pacing 90s-120s  has frequent PACs/PVCs, which is baseline      HTN Regimen:  Increase to Toprol XL, 50mg PO Daily  Continue Norvasc, 10 mg PO Daily    TAVR anticoagulation regimen: ASA/Plavix   On Plavix PTA for multiple TRUNG placements from LM through LAD    Postoperative transthoracic echocardiogram:  Echo completed; Well seated AV, without PVL    Continue Statin therapy    D/C central line    Continue Subcutaneous  Heparin for DVT prophylaxis    Pulmonary:     Good Room air oxygen saturation; Continue incentive spirometry/Coughing/Deep breathing exercises    Renal:     Preoperative CKD, stage 3a   Baseline creatinine 1.1-1.6  Creatinine 1.56 today, from 1.40  Daily BMP ordered    Diuretic Regimen:  Continue PO Torsemide, 20 mg QD  Continue Potassium Chloride 20 mEq PO QD  Stop diuretics on discharge    BS Qshift w/ h/o BPH    Continue Flomax    Neuro:    Neurologically intact; No active issues     Incisional pain well-controlled; Continue prn Tylenol    GI:    Tolerating diet without complaint    Continue stool softeners and prn suppository    Continue GI prophylaxis    Endo:     Glucose well-controlled with insulin sliding scale coverage    7.  Hematology:     Post-operative acute blood loss anemia; Hemoglobin 11.4 from 11.2; trend prn  Leukocytosis; Afebrile with no signs of infection; Trend CBC prn    8.  Disposition:    Gerontology consultation already completed for routine assessment of TAVR patient over 65 years old    Cleared for discharge home today pending EP clearance      VTE Pharmacologic Prophylaxis: Heparin  VTE Mechanical Prophylaxis: sequential compression device    Collaborative rounds completed with supervising physician  Plan of care discussed with bedside nurse    SIGNATURE: Boby Aguilar PA-C  DATE: May 8, 2025  TIME: 8:56 AM

## 2025-05-08 NOTE — ASSESSMENT & PLAN NOTE
New after TAVR with prior QRS duration 80 ms with LBBB 140 MS   3 hour EKG shows persistently long, albeit shorter  ms   On metoprolol in the outpatient setting for NSVT and CAD   Restart metoprolol - for CAD, PVC/NSVT  Having significant ectopy on tele post TAVR   Having 2:1 block at times on tele   Now s/p MDT dual chamber pacer   CXR w/o ptx   Discussed post op restrictions with patient and daughter over the phone in detail   Restart metop   2 week site check in office   EP to sign off. Please reach out with questions or concerns

## 2025-05-08 NOTE — ASSESSMENT & PLAN NOTE
Noted on prior holter monitor   Does have frequent PVCs on monitor here   Last echo with preserved EF   He is on metoprolol 25 mg daily for control - restarted post pacer

## 2025-05-08 NOTE — PROGRESS NOTES
Progress Note - Electrophysiology   Name: Christian Jones 89 y.o. male I MRN: [id number]  Unit/Bed#: PPHP-306-01 I Date of Admission: 5/6/2025   Date of Service: 5/8/2025 I Hospital Day: 2     Assessment & Plan  New onset left bundle branch block (LBBB)  New after TAVR with prior QRS duration 80 ms with LBBB 140 MS   3 hour EKG shows persistently long, albeit shorter  ms   On metoprolol in the outpatient setting for NSVT and CAD   Restart metoprolol - for CAD, PVC/NSVT  Having significant ectopy on tele post TAVR   Having 2:1 block at times on tele   Now s/p MDT dual chamber pacer   CXR w/o ptx   Discussed post op restrictions with patient and daughter over the phone in detail   Restart metop   2 week site check in office   EP to sign off. Please reach out with questions or concerns   Aortic stenosis  S/p TAVR w/ 26 mm Marinelli AIMEE 3 Ultra Resilia   CKD (chronic kidney disease) stage 3, GFR 30-59 ml/min ([facility])  Lab Results   Component Value Date    EGFR 46 05/08/2025    EGFR 39 05/07/2025    EGFR 44 05/06/2025    CREATININE 1.36 (H) 05/08/2025    CREATININE 1.56 (H) 05/07/2025    CREATININE 1.40 (H) 05/06/2025   Appears close to baseline   Usually around 1.4   NSVT (nonsustained ventricular tachycardia) ([facility])  Noted on prior holter monitor   Does have frequent PVCs on monitor here   Last echo with preserved EF   He is on metoprolol 25 mg daily for control - restarted post pacer   Coronary artery disease involving native coronary artery  Noted on pre TAVR cath with severe 2V CAD   LAD 65% prox 70% mid long and 80% distal lesion   LCX mild diffuse disease   Multifocal RCA dz distal 65% and mid RPDA 90%   Statin, ASA, BB plavix  Frailty      Progress Note - Electrophysiology - Cardiology  Christian Jones 89 y.o. male MRN: [id number]  Unit/Bed#: PPHP-306-01 Encounter: [id number]      Subjective/Objective   Subjective: well this am without complaints. Discussed post op restrictions with him in detail.  "      Objective:  Vitals: /66   Pulse (!) 116   Temp 99.5 °F (37.5 °C) (Oral)   Resp (!) 25   Ht 5' 5\" (1.651 m)   Wt 66.3 kg (146 lb 3.2 oz)   SpO2 96%   BMI 24.33 kg/m²     Vitals:    05/07/25 0544 05/08/25 0539   Weight: 67.3 kg (148 lb 5.9 oz) 66.3 kg (146 lb 3.2 oz)     Orthostatic Blood Pressures      Flowsheet Row Most Recent Value   Blood Pressure 138/66 filed at 05/08/2025 0700   Patient Position - Orthostatic VS Sitting filed at 05/07/2025 0800              Intake/Output Summary (Last 24 hours) at 5/8/2025 0752  Last data filed at 5/8/2025 0540  Gross per 24 hour   Intake 680 ml   Output 1840 ml   Net -1160 ml       Invasive Devices       Central Venous Catheter Line  Duration             CVC Central Lines 05/06/25 2 days              Peripheral Intravenous Line  Duration             Peripheral IV 05/06/25 Right Hand 2 days    Peripheral IV 05/07/25 Left Antecubital <1 day                              Scheduled Meds:  Current Facility-Administered Medications   Medication Dose Route Frequency Provider Last Rate    acetaminophen  650 mg Rectal Q4H PRN Lis Mcclelland PA-C      acetaminophen  650 mg Oral Q4H PRN Lis Mcclelland PA-C      amLODIPine  10 mg Oral Daily Lis Mcclelland PA-C      aspirin  81 mg Oral Daily Lis Mclcelland PA-C      atorvastatin  80 mg Oral Daily Lis Mcclelland PA-C      bisacodyl  10 mg Rectal Daily PRN Lis Mcclelland PA-C      calcium gluconate  2 g Intravenous Once PRN Lis Mcclelland PA-C      chlorhexidine  15 mL Mouth/Throat BID Lis Mcclelland PA-C      clopidogrel  75 mg Oral Daily Lis Mcclelland PA-C      heparin (porcine)  5,000 Units Subcutaneous Q8H ELSY Lis Mcclelland PA-C      hydrALAZINE  10 mg Intravenous Q6H PRN Lis Mcclelland PA-C      insulin lispro  1-5 Units Subcutaneous TID AC Lis Mcclelland PA-C      insulin lispro  1-5 Units Subcutaneous HS Lis Mcclelland PA-C      lactated ringers  20 " mL/hr Intravenous Continuous Lis Mcclelland PA-C      meclizine  25 mg Oral Q8H PRN Lis Mcclelland PA-C      metoprolol succinate  25 mg Oral Daily Lis Mcclelland PA-C      mupirocin  1 Application Nasal Q12H ELSY Lis Mcclelland PA-C      ondansetron  4 mg Intravenous Q6H PRN Lis Mcclelland PA-C      pantoprazole  40 mg Oral Daily Lis Mcclelland PA-C      polyethylene glycol  17 g Oral Daily Lis Mcclelland PA-C      potassium chloride  20 mEq Oral Daily Lis Mcclelland PA-C      tamsulosin  0.4 mg Oral Daily With Dinner Lis Mcclelland PA-C      torsemide  20 mg Oral Daily Lis Mcclelland PA-C       Continuous Infusions:lactated ringers, 20 mL/hr      PRN Meds:.  acetaminophen    acetaminophen    bisacodyl    calcium gluconate    hydrALAZINE    meclizine    ondansetron    Review of Systems:  Review of Systems   Constitutional: Negative for fever and malaise/fatigue.   Cardiovascular:  Negative for chest pain, dyspnea on exertion, irregular heartbeat and palpitations.   Respiratory:  Negative for shortness of breath.    Neurological:  Negative for dizziness and light-headedness.   All other systems reviewed and are negative.    ROS as noted above, otherwise 12 point review of systems was performed and is negative.     Physical Exam:   Physical Exam  Vitals and nursing note reviewed.   Constitutional:       General: He is not in acute distress.  HENT:      Head: Normocephalic and atraumatic.   Cardiovascular:      Rate and Rhythm: Normal rate and regular rhythm.   Pulmonary:      Effort: Pulmonary effort is normal. No respiratory distress.   Musculoskeletal:      Right lower leg: No edema.      Left lower leg: No edema.   Skin:     General: Skin is warm and dry.      Coloration: Skin is not jaundiced.   Neurological:      General: No focal deficit present.      Mental Status: He is alert and oriented to person, place, and time.   Psychiatric:         Mood and Affect: Mood  normal.                   Lab Results: I have personally reviewed pertinent lab results.    Results from last 7 days   Lab Units 25  0525 25  0405 25  0857 25  0748 25  1108   WBC Thousand/uL 13.24* 11.26*  --   --  7.26   HEMOGLOBIN g/dL 12.4 11.4* 11.2*  --  12.1   I STAT HEMOGLOBIN   --   --   --    < >  --    HEMATOCRIT % 37.9 35.3* 35.0*  --  38.8   HEMATOCRIT, ISTAT   --   --   --    < >  --    PLATELETS Thousands/uL 163 187 177  --  243    < > = values in this interval not displayed.     Results from last 7 days   Lab Units 25  0525  0405 25  0857 25  0845   POTASSIUM mmol/L 3.8 3.9 3.9  --    CHLORIDE mmol/L 107 107 111*  --    CO2 mmol/L 21 23 21  --    CO2, I-STAT mmol/L  --   --   --  21   BUN mg/dL 22 20 21  --    CREATININE mg/dL 1.36* 1.56* 1.40*  --    GLUCOSE, ISTAT mg/dl  --   --   --  107   CALCIUM mg/dL 8.3* 8.3* 8.0*  --      Results from last 7 days   Lab Units 25  0525 25  1108   INR  1.19 1.07     Results from last 7 days   Lab Units 25  0405   MAGNESIUM mg/dL 1.9       Imaging: Results Review Statement: I reviewed radiology reports from this admission including: chest xray.  Results for orders placed during the hospital encounter of 21    Echo complete with contrast if indicated    Narrative  [address] [address] [phone]    Transthoracic Echocardiogram  2D, M-mode, Doppler, and Color Doppler    Study date:  03-Sep-2021    Patient: FAMILIA LUCIO  MR number: [id number]  Account number: [id number]  : 1935  Age: 85 years  Gender: Male  Status: Outpatient  Location: [address] [place] [address] and [facility] [place]  Height: 64 in  Weight: 163.7 lb  BP: 138/ 85 mmHg    Indications: Murmur    Diagnoses: R01.1 - Cardiac murmur, unspecified    Sonographer:  Olegario Valencia RDCS  Referring Physician:  Marvin Meadows MD  Group:  [facility]  Interpreting Physician:  Jeffrey Beltrán MD    SUMMARY    LEFT VENTRICLE:  Systolic function was normal. Ejection fraction was estimated to be 55 %.  There were no regional wall motion abnormalities.  Doppler parameters were consistent with abnormal left ventricular relaxation (grade 1 diastolic dysfunction).    MITRAL VALVE:  There was mild regurgitation.    AORTIC VALVE:  Transaortic velocity was increased due to valvular stenosis.  There was mild stenosis.  Valve mean gradient was 10 mmHg.    TRICUSPID VALVE:  There was mild regurgitation.  Pulmonary artery systolic pressure was within the normal range.    HISTORY: PRIOR HISTORY: HTN, HLD    PROCEDURE: The study was performed in the [address] [place] [address] [facility] [place]. This was a routine study. The transthoracic approach was used. The study included complete 2D imaging, M-mode, complete spectral Doppler, and color Doppler. The  heart rate was 95 bpm, at the start of the study. Images were obtained from the parasternal, apical, subcostal, and suprasternal notch acoustic windows. Echocardiographic views were limited due to lung interference. Image quality was  adequate.    LEFT VENTRICLE: Size was normal. Systolic function was normal. Ejection fraction was estimated to be 55 %. There were no regional wall motion abnormalities. Wall thickness was normal. DOPPLER: Doppler parameters were consistent with  abnormal left ventricular relaxation (grade 1 diastolic dysfunction).    RIGHT VENTRICLE: The size was normal. Systolic function was normal. Wall thickness was normal.    LEFT ATRIUM: Size was normal.    RIGHT ATRIUM: Size was normal.    MITRAL VALVE: Valve structure was normal. There was normal leaflet separation. DOPPLER: The transmitral velocity was within the normal range. There was no evidence for stenosis. There was mild regurgitation.    AORTIC VALVE: The valve was trileaflet. Leaflets exhibited mildly increased thickness, mild calcification, and  mildly reduced cuspal separation. DOPPLER: Transaortic velocity was increased due to valvular stenosis. There was mild stenosis.  There was no significant regurgitation.    TRICUSPID VALVE: The valve structure was normal. There was normal leaflet separation. DOPPLER: The transtricuspid velocity was within the normal range. There was no evidence for stenosis. There was mild regurgitation. Pulmonary artery  systolic pressure was within the normal range. Estimated peak PA pressure was 25 mmHg.    PULMONIC VALVE: Leaflets exhibited normal thickness, no calcification, and normal cuspal separation. DOPPLER: The transpulmonic velocity was within the normal range. There was no significant regurgitation.    PERICARDIUM: There was no pericardial effusion. The pericardium was normal in appearance.    AORTA: The root exhibited normal size.    SYSTEMIC VEINS: IVC: The inferior vena cava was normal in size. Respirophasic changes were normal.    MEASUREMENT TABLES    2D MEASUREMENTS  LVOT   (Reference normals)  Diam   20 mm   (--)    DOPPLER MEASUREMENTS  LVOT   (Reference normals)  Peak amira   84 cm/s   (--)  Mean amira   64 cm/s   (--)  VTI   18 cm   (--)  Peak gradient   3 mmHg   (--)  Mean gradient   2 mmHg   (--)  Stroke vol   56.55 ml   (--)  Aortic valve   (Reference normals)  Peak amira   211 cm/s   (--)  Mean amira   145 cm/s   (--)  VTI   40 cm   (--)  Peak gradient   18 mmHg   (--)  Mean gradient   10 mmHg   (--)  Obstr index, VTI   0.45    (--)  Valve area, VTI   1.41 cmï¾²   (--)  Area index, VTI   0.78 cmï¾²/mï¾²   (--)  Obstr index, Vmax   0.4    (--)  Valve area, Vmax   1.26 cmï¾²   (--)  Area index, Vmax   0.7 cmï¾²/mï¾²   (--)  Obstr index, Vmean   0.44    (--)  Valve area, Vmean   1.38 cmï¾²   (--)  Area index, Vmean   0.77 cmï¾²/mï¾²   (--)    SYSTEM MEASUREMENT TABLES    2D  %FS: 25.57 %  Ao Diam: 3.16 cm  Ao asc: 3.74 cm  EDV(Teich): 71.74 ml  EF(Teich): 50.89 %  ESV(Teich): 35.23 ml  HR_4Ch_Q: 81.08 BPM  IVSd: 1  cm  LA Diam: 4.09 cm  LAAs A4C: 18.66 cm2  LAESV A-L A4C: 55.68 ml  LAESV MOD A4C: 52.9 ml  LALs A4C: 5.31 cm  LVCO_4Ch_Q: 4.29 L/min  LVEDV MOD A4C: 76.67 ml  LVEF MOD A4C: 65.03 %  LVEF_4Ch_Q: 54.58 %  LVESV MOD A4C: 26.81 ml  LVIDd: 4.04 cm  LVIDs: 3.01 cm  LVLd A4C: 7.62 cm  LVLd_4Ch_Q: 7.2 cm  LVLs A4C: 5.88 cm  LVLs_4Ch_Q: 5.83 cm  LVOT Diam: 1.97 cm  LVPWd: 0.94 cm  LVSV_4Ch_Q: 52.89 ml  LVVED_4Ch_Q: 96.91 ml  LVVES_4Ch_Q: 44.02 ml  RAEDV A-L: 29.34 ml  RAEDV MOD: 30.11 ml  RALd: 5.27 cm  RVIDd: 3.27 cm  SV MOD A4C: 49.86 ml  SV(Teich): 36.51 ml    CW  AV Env.Ti: 286.87 ms  AV VTI: 39.16 cm  AV Vmax: 1.99 m/s  AV Vmean: 1.37 m/s  AV maxPG: 15.85 mmHg  AV meanP.62 mmHg  TR Vmax: 2.31 m/s  TR maxP.29 mmHg    MM  TAPSE: 1.87 cm    PW  TESSIE (VTI): 1.27 cm2  TESSIE Vmax: 1.26 cm2  AVAI (VTI): 0 cm2/m2  AVAI Vmax: 0 cm2/m2  E' Sept: 0.06 m/s  E/E' Sept: 13.55  LVOT Env.Ti: 281.64 ms  LVOT VTI: 16.33 cm  LVOT Vmax: 0.82 m/s  LVOT Vmean: 0.58 m/s  LVOT maxP.72 mmHg  LVOT meanP.51 mmHg  LVSI Dopp: 27.54 ml/m2  LVSV Dopp: 49.58 ml  MV A Dariel: 1.21 m/s  MV Dec Dickey: 5.87 m/s2  MV DecT: 163.53 ms  MV E Dariel: 0.88 m/s  MV E/A Ratio: 0.73  MV PHT: 42.3 ms  MVA By PHT: 5.2 cm2    [facility] Accredited [facility]chocardi[facility]    Prepared and electronically signed by    Jeffrey Beltrán MD  Signed 03-Sep-2021 15:49:00      VTE Pharmacologic Prophylaxis: Heparin  VTE Mechanical Prophylaxis: sequential compression device

## 2025-05-08 NOTE — ANESTHESIA POSTPROCEDURE EVALUATION
Post-Op Assessment Note            No anethesia notable event occurred.    Comments: uneventful post procedure course        Last Filed PACU Vitals:  Vitals Value Taken Time   Temp 98.2 °F (36.8 °C) 05/08/25 1119   Pulse 91 05/08/25 1217   /65 05/08/25 1200   Resp 28 05/08/25 0744   SpO2 97 % 05/08/25 1217   Vitals shown include unfiled device data.

## 2025-05-08 NOTE — DISCHARGE INSTR - AVS FIRST PAGE
Post Procedure Care instructions:     Pain management   Tylenol (acetaminophen) and ice      If you go home on the day of the procedure:  Please send device transmission the following morning      Wound care  For 7 days:  Bandage must remain on wound   Bathing:    If bandage has a good seal on all sides, you may shower   If the bandage is not sealed or there is any question/concern, do not shower. Sponge bathe only     One week after procedure:   Remove bandage    Do not use lotions/powders/creams on incision   Shower normally, do not scrub wound      Restrictions for 6 weeks: (apply these rules only to the arm closest to the incision)   Do not raise elbow above shoulder height   Do not lift greater than 10lbs    No pulling on grab bar or bed rail    When to call clinic:    Redness or swelling at incision site   Opening and/or drainage from the incision   Temperature >100.4 F     If you have any questions:   [phone] 8:30am-5:30pm  [phone] After hours  [phone] Appointments     Information about your cardiac device:   Pacemaker     WHAT YOU SHOULD KNOW:      A pacemaker is a small, battery-powered device that is placed under your skin in your upper chest area with wires placed through a vein that lead directly into the heart. It helps regulate your heart rate and prevent your heart from beating too slowly.      Frequently asked questions about cardiac devices     How long does the device last?    Approximately 10 years, it depends on how often your body uses it     How is my device monitored?    You will have a 2-week follow-up with our device clinic after the initial placement.  At this visit our device techs will establish your follow up appointments   Transmissions are sent every 3 months    Alerts are sent if there are changes to your heart rhythm or the device in between transmissions     Are there long-term restrictions or are there activities that will affect my device?    Generally once healing  is completed there are minimal restrictions long-term   When going through security checkpoints, let them know you have an implanted cardiac device.  You will receive a permeant card, keep this with you   If you have any questions about electronic equipment and your device, please call the device  hotline

## 2025-05-08 NOTE — RESTORATIVE TECHNICIAN NOTE
Restorative Technician Note      Patient Name: Christian Jones     Restorative Tech Visit Date: 05/08/25  Note Type: Mobility  Patient Position Upon Consult: Bedside chair  Activity Performed: Ambulated  Assistive Device: Roller walker  Patient Position at End of Consult: Bed/Chair alarm activated; All needs within reach; Bedside chair

## 2025-05-08 NOTE — PLAN OF CARE
Problem: Prexisting or High Potential for Compromised Skin Integrity  Goal: Skin integrity is maintained or improved  Description: INTERVENTIONS:- Identify patients at risk for skin breakdown- Assess and monitor skin integrity- Assess and monitor nutrition and hydration status- Monitor labs - Assess for incontinence - Turn and reposition patient- Assist with mobility/ambulation- Relieve pressure over bony prominences- Avoid friction and shearing- Provide appropriate hygiene as needed including keeping skin clean and dry- Evaluate need for skin moisturizer/barrier cream- Collaborate with interdisciplinary team - Patient/family teaching- Consider wound care consult   Outcome: Progressing     Problem: PAIN - ADULT  Goal: Verbalizes/displays adequate comfort level or baseline comfort level  Description: Interventions:- Encourage patient to monitor pain and request assistance- Assess pain using appropriate pain scale- Administer analgesics based on type and severity of pain and evaluate response- Implement non-pharmacological measures as appropriate and evaluate response- Consider cultural and social influences on pain and pain management- Notify physician/advanced practitioner if interventions unsuccessful or patient reports new pain  Outcome: Progressing     Problem: INFECTION - ADULT  Goal: Absence or prevention of progression during hospitalization  Description: INTERVENTIONS:- Assess and monitor for signs and symptoms of infection- Monitor lab/diagnostic results- Monitor all insertion sites, i.e. indwelling lines, tubes, and drains- Monitor endotracheal if appropriate and nasal secretions for changes in amount and color- [place] appropriate cooling/warming therapies per order- Administer medications as ordered- Instruct and encourage patient and family to use good hand hygiene technique- Identify and instruct in appropriate isolation precautions for identified infection/condition  Outcome: Progressing  Goal:  Absence of fever/infection during neutropenic period  Description: INTERVENTIONS:- Monitor WBC  Outcome: Progressing     Problem: SAFETY ADULT  Goal: Patient will remain free of falls  Description: INTERVENTIONS:- Educate patient/family on patient safety including physical limitations- Instruct patient to call for assistance with activity - Consult OT/PT to assist with strengthening/mobility - Keep Call bell within reach- Keep bed low and locked with side rails adjusted as appropriate- Keep care items and personal belongings within reach- Initiate and maintain comfort rounds- Make Fall Risk Sign visible to staff- Offer Toileting every 2 Hours, in advance of need- Initiate/Maintain bed alarm- Obtain necessary fall risk management equipment: - Apply yellow socks and bracelet for high fall risk patients- Consider moving patient to room near nurses station  Outcome: Progressing  Goal: Maintain or return to baseline ADL function  Description: INTERVENTIONS:-  Assess patient's ability to carry out ADLs; assess patient's baseline for ADL function and identify physical deficits which impact ability to perform ADLs (bathing, care of mouth/teeth, toileting, grooming, dressing, etc.)- Assess/evaluate cause of self-care deficits - Assess range of motion- Assess patient's mobility; develop plan if impaired- Assess patient's need for assistive devices and provide as appropriate- Encourage maximum independence but intervene and supervise when necessary- Involve family in performance of ADLs- Assess for home care needs following discharge - Consider OT consult to assist with ADL evaluation and planning for discharge- Provide patient education as appropriate  Outcome: Progressing  Goal: Maintains/Returns to pre admission functional level  Description: INTERVENTIONS:- Perform AM-PAC 6 Click Basic Mobility/ Daily Activity assessment daily.- Set and communicate daily mobility goal to care team and patient/family/caregiver. -  Collaborate with rehabilitation services on mobility goals if consulted- Perform Range of Motion 3 times a day.- Reposition patient every 2 hours.- Dangle patient 3 times a day- Stand patient 2 times a day- Ambulate patient 2 times a day- Out of bed to chair 3 times a day - Out of bed for meals 3 times a day- Out of bed for toileting- Record patient progress and toleration of activity level   Outcome: Progressing     Problem: DISCHARGE PLANNING  Goal: Discharge to home or other facility with appropriate resources  Description: INTERVENTIONS:- Identify barriers to discharge w/patient and caregiver- Arrange for needed discharge resources and transportation as appropriate- Identify discharge learning needs (meds, wound care, etc.)- Arrange for interpretive services to assist at discharge as needed- Refer to Case Management Department for coordinating discharge planning if the patient needs post-hospital services based on physician/advanced practitioner order or complex needs related to functional status, cognitive ability, or social support system  Outcome: Progressing     Problem: Knowledge Deficit  Goal: Patient/family/caregiver demonstrates understanding of disease process, treatment plan, medications, and discharge instructions  Description: Complete learning assessment and assess knowledge base.Interventions:- Provide teaching at level of understanding- Provide teaching via preferred learning methods  Outcome: Progressing

## 2025-05-08 NOTE — CASE MANAGEMENT
Case Management Assessment & Discharge Planning Note    Patient name Christian Jones  [facility] [id number]  : 1935 Date 2025       Current Admission Date: 2025  Current Admission Diagnosis:Aortic stenosis   Patient Active Problem List    Diagnosis Date Noted Date Diagnosed    PAC (premature atrial contraction) 2025     SVT (supraventricular tachycardia) ([facility]) 2025     Bigeminy 2025     S/p TAVR (transcatheter aortic valve replacement), bioprosthetic 2025     New onset left bundle branch block (LBBB) 2025     Frailty 2025     Encounter for support to caregiver 2025     At risk for falls 2025     BPH (benign prostatic hyperplasia) 2025     CKD stage 3a, GFR 45-59 ml/min ([facility]) 2025     Benign hypertension with chronic kidney disease, stage III ([facility]) 2025     At risk for electrolyte imbalance 2025     Anemia 2025     Coronary artery disease involving native coronary artery 04/15/2025     Essential hypertension 2025     Stroke-like symptoms 2025     Abnormal EKG 2025     NSVT (nonsustained ventricular tachycardia) ([facility]) 2025     Pulmonary edema 2025     Aortic stenosis 2025     Dyspnea on exertion 2025     Peripheral vascular disease ([facility]) 2024     Depression, recurrent ([facility]) 2023     CKD (chronic kidney disease) stage 3, GFR 30-59 ml/min ([facility]) 2022     Rectal bleeding 2019       LOS (days): 2  Geometric Mean LOS (GMLOS) (days): 1.3  Days to GMLOS:-0.8     OBJECTIVE:    Risk of Unplanned Readmission Score: 14.19         Current admission status: Inpatient       Preferred Pharmacy:   [facility] [id number] - [place] [address] [place]  [place] [address]  Phone: [phone] Fax: [phone]    Primary Care Provider: Marvin Meadows MD    Primary Insurance:  [facility] REP  Secondary Insurance:     ASSESSMENT:  Active Health Care Proxies       Selin Jones Health Care Representative - Spouse   Primary Phone: [phone] (Mobile)  Home Phone: [phone]                 Advance Directives  Does patient have a Health Care POA?: No  Was patient offered paperwork?: Yes (declined)  Does patient currently have a Health Care decision maker?: Yes, please see Health Care Proxy section  Does patient have Advance Directives?: No  Was patient offered paperwork?: Yes (declined)  Primary Contact: wife Selin Jones         Readmission Root Cause  30 Day Readmission: No    Patient Information  Admitted from:: Home  Mental Status: Alert  During Assessment patient was accompanied by: Not accompanied during assessment  Assessment information provided by:: Patient  Primary Caregiver: Self  Support Systems: Spouse/significant other, Daughter  County of Residence: [place]  What city do you live in?: [place]  Is patient a ?: No    Activities of Daily Living Prior to Admission  Functional Status: Independent  Completes ADLs independently?: Yes  Ambulates independently?: Yes  Does patient use assisted devices?: Yes  Assisted Devices (DME) used: Walker, Straight Cane  Does patient currently own DME?: Yes  What DME does the patient currently own?: Walker, Straight Cane  Does patient have a history of HHC?: No  Does patient currently have HHC?: No    Current Home Health Care  Home Health Agency Name:: [facility]    Patient Information Continued  Income Source: Pension/[employer]  Does patient have prescription coverage?: Yes  Can the patient afford their medications and any related supplies (such as glucometers or test strips)?: Yes  Does patient receive dialysis treatments?: No  Does patient have a history of substance abuse?: Yes  Historical substance use preference: Alcohol/ETOH (13 ETOH a week)  Is patient currently in treatment for substance abuse?: Not  appropriate at this time to discuss.  Does patient have a history of Mental Health Diagnosis?: No         Means of Transportation  [facility]          DISCHARGE DETAILS:    Discharge planning discussed with:: Pt--recommended for home PT/OT, agrees, prefers [facility] if available  Freedom of Choice: Yes     CM contacted family/caregiver?: No- see comments (alert and oriented)  Were Treatment Team discharge recommendations reviewed with patient/caregiver?: Yes  Did patient/caregiver verbalize understanding of patient care needs?: Yes  Were patient/caregiver advised of the risks associated with not following Treatment Team discharge recommendations?: Yes    Contacts  Patient Contacts: wife Selin Jones  Relationship to Patient:: Family  Contact Method: Phone  Phone Number: [phone]  Reason/Outcome: Continuity of Care, Emergency Contact, Discharge Planning    Requested Home Health Care         Is the patient interested in HHC at discharge?: Yes  Home Health Discipline requested:: Occupational Therapy, Physical Therapy, Nursing  Home Health Agency Name:: [facility] Follow-Up Provider:: PCP  Home Health Services Needed:: Post-Op Care and Assessment, Evaluate Functional Status and Safety, Gait/ADL Training, Strengthening/Theraputic Exercises to Improve Function  Homebound Criteria Met:: Requires the Assistance of Another Person for Safe Ambulation or to Leave the Home, Uses an Assist Device (i.e. cane, walker, etc)  Supporting Clincal Findings:: Fatigues Easliy in Short Distances, Limited Endurance    DME Referral Provided  Referral made for DME?: No    Other Referral/Resources/Interventions Provided:  Interventions: HHC  Referral Comments: referral to [facility] as requested    Would you like to participate in our [facility] service program?  : No - Declined    Treatment Team Recommendation: Home with Home Health Care  Discharge Destination Plan:: Home with Home  Health Care  Transport at Discharge : Family                             IMM Given (Date)::  (NA)        Additional Comments: 88yo male is POD#2 and POD#1 PPM. Doing well, frail. Recommended for HHC. Chose [facility] to transport.

## 2025-05-08 NOTE — ASSESSMENT & PLAN NOTE
Lab Results   Component Value Date    EGFR 46 05/08/2025    EGFR 39 05/07/2025    EGFR 44 05/06/2025    CREATININE 1.36 (H) 05/08/2025    CREATININE 1.56 (H) 05/07/2025    CREATININE 1.40 (H) 05/06/2025   Appears close to baseline   Usually around 1.4

## 2025-05-09 ENCOUNTER — TELEPHONE (OUTPATIENT)
Dept: CARDIAC SURGERY | Facility: CLINIC | Age: 89
End: 2025-05-09

## 2025-05-09 ENCOUNTER — HOME CARE VISIT (OUTPATIENT)
Dept: HOME HEALTH SERVICES | Facility: HOME HEALTHCARE | Age: 89
End: 2025-05-09
Attending: PHYSICIAN ASSISTANT
Payer: COMMERCIAL

## 2025-05-09 VITALS
HEIGHT: 65 IN | DIASTOLIC BLOOD PRESSURE: 53 MMHG | WEIGHT: 146 LBS | HEART RATE: 69 BPM | SYSTOLIC BLOOD PRESSURE: 88 MMHG | TEMPERATURE: 97.5 F | BODY MASS INDEX: 24.32 KG/M2 | OXYGEN SATURATION: 95 % | RESPIRATION RATE: 18 BRPM

## 2025-05-09 PROCEDURE — G0299 HHS/HOSPICE OF RN EA 15 MIN: HCPCS

## 2025-05-09 PROCEDURE — 400013 VN SOC

## 2025-05-09 NOTE — TELEPHONE ENCOUNTER
POST OP CALL    5/6/25:  Elective TAVR  5/7/25: PPM  5/8/25: Discharge home  5/9/25.  VN called to reports BP 80/40. Patient is asymptomatic, feeling well. Reviewed meds - advised to decrease Metoprolol (Toprol XL) from 50 mg back to 25 mg daily  starting tomorrow and he already took dose this AM.  No SOB, CP, lightheadedness, palpitations, edema.  Cardiology appt on Monday 5/12/25.

## 2025-05-09 NOTE — TELEPHONE ENCOUNTER
Lisa from [facility] called with concerns about Christian's bp taken manually today it was 80/40. She reports he is asymptomatic. Wanted to now if his Metoprolol 25 mg should be decreased? Transferred call to our clinician.

## 2025-05-09 NOTE — UTILIZATION REVIEW
NOTIFICATION OF ADMISSION DISCHARGE   This is a Notification of Discharge from [facility]. Please be advised that this patient has been discharge from our facility. Below you will find the admission and discharge date and time including the patient’s disposition.   UTILIZATION REVIEW CONTACT:  Utilization Review Assistants  Network Utilization Review Department  Phone: [phone] x carefully listen to the prompts. All voicemails are confidential.  Email: [email or web address]     ADMISSION INFORMATION  PRESENTATION DATE: 5/6/2025  5:21 AM  OBERVATION ADMISSION DATE: N/A  INPATIENT ADMISSION DATE: 5/6/25  8:56 AM   DISCHARGE DATE: 5/8/2025  2:02 PM   DISPOSITION:Home with Home Health Care    [facility] Utilization Review Department  ATTENTION: Please call with any questions or concerns to [phone] and carefully listen to the prompts so that you are directed to the right person. All voicemails are confidential.   For Discharge needs, contact Care Management DC Support Team at [phone] opt. 2  Send all requests for admission clinical reviews, approved or denied determinations and any other requests to dedicated fax number below belonging to the campus where the patient is receiving treatment. List of dedicated fax numbers for the Facilities:  FACILITY NAME UR FAX NUMBER   ADMISSION DENIALS (Administrative/Medical Necessity) [phone]   DISCHARGE SUPPORT TEAM ([facility]) [phone]   PARENT CHILD HEALTH (Maternity/NICU/Pediatrics) [phone]   [facility] [phone]   [facility] [phone]   [facility] [phone]   [facility] [phone]   [facility] [phone]   [facility] [phone]   General acute hospital [phone]   [facility] [phone]   [facility]  [facility] [phone]   [facility] [phone]   [facility] [phone]   [facility] [phone]

## 2025-05-12 ENCOUNTER — OFFICE VISIT (OUTPATIENT)
Dept: CARDIOLOGY CLINIC | Facility: CLINIC | Age: 89
End: 2025-05-12
Payer: COMMERCIAL

## 2025-05-12 VITALS
DIASTOLIC BLOOD PRESSURE: 76 MMHG | SYSTOLIC BLOOD PRESSURE: 124 MMHG | WEIGHT: 153.4 LBS | BODY MASS INDEX: 25.56 KG/M2 | HEART RATE: 80 BPM | HEIGHT: 65 IN

## 2025-05-12 DIAGNOSIS — I49.5 TACHY-BRADY SYNDROME (HCC): ICD-10-CM

## 2025-05-12 DIAGNOSIS — I25.10 CORONARY ARTERY DISEASE INVOLVING NATIVE CORONARY ARTERY OF NATIVE HEART WITHOUT ANGINA PECTORIS: ICD-10-CM

## 2025-05-12 DIAGNOSIS — N18.31 STAGE 3A CHRONIC KIDNEY DISEASE (HCC): ICD-10-CM

## 2025-05-12 DIAGNOSIS — R29.90 STROKE-LIKE SYMPTOMS: ICD-10-CM

## 2025-05-12 DIAGNOSIS — I35.0 AORTIC VALVE STENOSIS, ETIOLOGY OF CARDIAC VALVE DISEASE UNSPECIFIED: Primary | ICD-10-CM

## 2025-05-12 DIAGNOSIS — I10 ESSENTIAL HYPERTENSION: ICD-10-CM

## 2025-05-12 DIAGNOSIS — I44.7 NEW ONSET LEFT BUNDLE BRANCH BLOCK (LBBB): ICD-10-CM

## 2025-05-12 DIAGNOSIS — I25.118 CORONARY ARTERY DISEASE OF NATIVE ARTERY OF NATIVE HEART WITH STABLE ANGINA PECTORIS (HCC): ICD-10-CM

## 2025-05-12 DIAGNOSIS — Z95.3 S/P TAVR (TRANSCATHETER AORTIC VALVE REPLACEMENT), BIOPROSTHETIC: ICD-10-CM

## 2025-05-12 DIAGNOSIS — I47.29 NSVT (NONSUSTAINED VENTRICULAR TACHYCARDIA) (HCC): ICD-10-CM

## 2025-05-12 PROCEDURE — 93000 ELECTROCARDIOGRAM COMPLETE: CPT

## 2025-05-12 PROCEDURE — 99214 OFFICE O/P EST MOD 30 MIN: CPT

## 2025-05-12 RX ORDER — ATORVASTATIN CALCIUM 80 MG/1
80 TABLET, FILM COATED ORAL
Qty: 30 TABLET | Refills: 3 | Status: SHIPPED | OUTPATIENT
Start: 2025-05-12

## 2025-05-12 RX ORDER — METOPROLOL SUCCINATE 25 MG/1
50 TABLET, EXTENDED RELEASE ORAL DAILY
Qty: 60 TABLET | Refills: 3 | Status: SHIPPED | OUTPATIENT
Start: 2025-05-12

## 2025-05-12 RX ORDER — CLOPIDOGREL BISULFATE 75 MG/1
75 TABLET ORAL DAILY
Qty: 30 TABLET | Refills: 11 | Status: SHIPPED | OUTPATIENT
Start: 2025-05-12

## 2025-05-12 NOTE — PROGRESS NOTES
Christian Robert  1935  [id number]  [facility] CARDIOLOGY [place]  [address] [place] [id number] [phone]    1. Aortic valve stenosis, etiology of cardiac valve disease unspecified        2. Coronary artery disease of native artery of native heart with stable angina pectoris ([facility])  Lipid Panel With Direct LDL      3. Essential hypertension        4. New onset left bundle branch block (LBBB)        5. Stage 3a chronic kidney disease (HCC)        6. S/p TAVR (transcatheter aortic valve replacement), bioprosthetic        7. Tachy-ren syndrome ([facility])  POCT ECG      8. Stroke-like symptoms  clopidogrel (PLAVIX) 75 mg tablet      9. NSVT (nonsustained ventricular tachycardia) ([facility])  metoprolol succinate (TOPROL-XL) 25 mg 24 hr tablet      10. Coronary artery disease involving native coronary artery of native heart without angina pectoris  atorvastatin (LIPITOR) 80 mg tablet          Assessment/Plan  TODAY (05/12/25):   Doing well post cath. I talked to him about the importance of DAPT for the next year straight. Year supply sent to the pharmacy. Plans to do cardiac rehab.   Arm restrictions from his PPM review. Plans to take bandage off tomorrow with [facility].   Home BP's with systolic 80-90 mmHg. Will d/c amlodipine. He can continue taking metoprolol 50 mg daily.   Continue atorvastatin. Goal LDL < 70. Will recheck lipids in 3 months.  RTO in 3 months with me and 6 months with Dr. Jarrett    Coronary artery disease   - s/p PCI/TRUNG x 3 to the mLM to the pLAD and mLAD, s/p POBA to the ostial Lcx jailed by the prior TRUNG, with residual pLAD to mLAD 70% stenosis 04/25/25   - current rx: ASA 81 mg daily and Plavix 75 mg daily  Severe aortic stenosis   - s/p TAVR with a 26 mm Marinelli AIMEE 3 Ultra Resilia bioprosthetic valve  05/06/25   - current rx: Plavix 75 mg daily   - TTE 02/13/25: low flow, paradoxical severe aortic stenosis. The aortic valve peak velocity is 2.7 m/s. The aortic valve  mean gradient is 19 mmHg. The dimensionless velocity index is 0.30. The aortic valve area is 0.86 cm2. The stroke volume index is 27.20 ml/m2   Tachybrady syndrome/ LBBB  - s/p [employer] DC PPM w/ HIS/left bundle pacing with deep septal lead  Hypertension  - TTE 02/13/25: LVEF 55% with moderate asymmetric hypertrophy of the septal wall, grade I DD, mild LAE, low-flow AS, mild MAC, mild TR  - BP in office: 124/76 mmHg  - current rx: amlodipine 10 mg daily, Toprol-XL 50 mg daily   Hyperlipidemia   - lipid panel 04/11/25: cholesterol 169, triglycerides 100, HDL 45,   - current rx: atorvastatin 80 mg daily   Prediabetes- A1c 6.0   CKD stage 3a    Interval History:   This is a 88 y/o male with a PMH of CAD, HTN, HLD, severe AS, and prediabetes who is presenting today for hospital follow up. He had his LHC done on 04/15/25v nd recommended for complex PCI with severe 2 vessel disease. Admitted to \[facility]"" from 04/25/25-04/26/26 with successful placement of 3 TRUNG. Post-cath complicated by small hematoma above radial band.      He was then admitted to \[facility]\"" 05/06/25-05/08/25 for elective TAVR. Post- procedure complicated by persistent LBBB and tachy-ren syndrome. Consulted by EP and underwent PPM prior to discharge.     Christian presents with his daughter for follow up. He states he noticed a difference after his valve was replaced and is less fatigued. Does not feel as fatigued helping with his wife's ADL's as well. We reviewed his LHC, TAVR, and PPM. He plans to take the bandage from his pacemaker off tomorrow since that will be one week from the surgery date. [facility] planning to come to his house tomorrow. I did let them know to contact me with any concerns of the incision. We reviewed arm restrictions for the next 5 weeks. He has his transmitter plugged in. Takes his BP daily with systolic readings sometimes in the 80-90 mmHg. Did not get the message about decreasing his metoprolol so he is taking 50 mg daily. I discussed  cardiac rehab with him. He would like to wait until his follow up appointment with Dr. Caballero before the initial evaluation which is reasonable. Family history of CAD his father and grandfather.     Past Medical History:   Diagnosis Date    Aortic valve, bicuspid 2024    Arthritis 2000    Basal cell carcinoma     Head    Depression 1990    Ear problems     GERD (gastroesophageal reflux disease) 1990    Heart murmur long standing    Hyperlipemia     Hypertension     Positive fecal occult blood test     Psoriasis     Rectal bleeding     Tinnitus     continuing    Visual impairment 1950    corrected     Social History     Socioeconomic History    Marital status: /Civil Union     Spouse name: Not on file    Number of children: Not on file    Years of education: Not on file    Highest education level: Not on file   Occupational History    Not on file   Tobacco Use    Smoking status: Former     Current packs/day: 0.00     Average packs/day: 0.3 packs/day for 15.0 years (3.8 ttl pk-yrs)     Types: Pipe, Cigarettes     Start date:      Quit date: 1985     Years since quittin.9    Smokeless tobacco: Never   Vaping Use    Vaping status: Never Used   Substance and Sexual Activity    Alcohol use: Yes     Alcohol/week: 13.0 standard drinks of alcohol     Types: 7 Glasses of wine, 6 Cans of beer per week     Comment: drink with meals    Drug use: Never    Sexual activity: Not Currently     Partners: Female     Birth control/protection: Condom Male   Other Topics Concern    Not on file   Social History Narrative    Marital Status:      As per [employer]     Social Drivers of Health     Financial Resource Strain: Low Risk  (2023)    Overall Financial Resource Strain (CARDIA)     Difficulty of Paying Living Expenses: Not hard at all   Food Insecurity: No Food Insecurity (2025)    Nursing - Inadequate Food Risk Classification     Worried About Running Out of Food in  the Last Year: Never true     Ran Out of Food in the Last Year: Never true     Ran Out of Food in the Last Year: Never true   Transportation Needs: No Transportation Needs (2025)    OASIS : Transportation     Lack of Transportation (Medical): No     Lack of Transportation (Non-Medical): No     Patient Unable or Declines to Respond: No   Physical Activity: Not on file   Stress: Not on file   Social Connections: Not on file   Intimate Partner Violence: Unknown (2025)    Nursing IPS     Feels Physically and Emotionally Safe: Not on file     Physically Hurt by Someone: Not on file     Humiliated or Emotionally Abused by Someone: Not on file     Physically Hurt by Someone: No     Hurt or Threatened by Someone: No   Housing Stability: Unknown (2025)    Nursing: Inadequate Housing Risk Classification     Has Housing: Not on file     Worried About Losing Housing: Not on file     Unable to Get Utilities: Not on file     Unable to Pay for Housing in the Last Year: No     Has Housin      Family History   Problem Relation Age of Onset    Hypertension Father     Kidney disease Father     Thrombosis Mother         cause of death    Heart failure Mother               Past Surgical History:   Procedure Laterality Date    BASAL CELL CARCINOMA EXCISION      CARDIAC CATHETERIZATION N/A 4/15/2025    Procedure: Cardiac Coronary Angiogram;  Surgeon: Marlene Carney DO;  Location: [facility];  Service: Cardiology    CARDIAC CATHETERIZATION N/A 2025    Procedure: Cardiac PCI;  Surgeon: Marlene Carney DO;  Location: [facility];  Service: Cardiology    CARDIAC CATHETERIZATION N/A 2025    Procedure: Cardiac PCI Stent;  Surgeon: Marlene Carney DO;  Location: [facility];  Service: Cardiology    CARDIAC CATHETERIZATION N/A 2025    Procedure: Cardiac tavr;  Surgeon: Jered Mckeon MD;  Location: [facility];  Service: Cardiology    CARDIAC ELECTROPHYSIOLOGY  PROCEDURE N/A 5/7/2025    Procedure: Cardiac pacer implant;  Surgeon: José Antonio Land MD;  Location: [facility];  Service: Cardiology    COLONOSCOPY      COLONOSCOPY  03/28/2019    HERNIA REPAIR  2012    Inguinal    LIPOMA RESECTION      Scalp    OR REPLACE AORTIC VALVE OPENFEMORAL ARTERY APPROACH N/A 5/6/2025    Procedure: REPLACEMENT AORTIC VALVE TRANSCATHETER (TAVR) TRANSFEMORAL W/ 26MM VALVE(ACCESS ON RIGHT) HIEN;  Surgeon: DIMAS Caballero MD;  Location: [facility];  Service: Cardiac Surgery    SIGMOIDOSCOPY         Current Outpatient Medications:     acetaminophen (TYLENOL) 650 mg CR tablet, Take 1 tablet by mouth every 8 (eight) hours as needed for mild pain For arthritis pain, Disp: , Rfl:     alfuzosin (UROXATRAL) 10 mg 24 hr tablet, TAKE 1 TABLET BY MOUTH DAILY, Disp: 30 tablet, Rfl: 5    aspirin 81 mg chewable tablet, Chew 1 tablet (81 mg total) daily, Disp: 30 tablet, Rfl: 0    atorvastatin (LIPITOR) 80 mg tablet, Take 1 tablet (80 mg total) by mouth daily with dinner, Disp: 30 tablet, Rfl: 3    clopidogrel (PLAVIX) 75 mg tablet, Take 1 tablet (75 mg total) by mouth daily, Disp: 30 tablet, Rfl: 11    cyanocobalamin (VITAMIN B-12) 2000 MCG tablet, Take 1 tablet by mouth daily., Disp: , Rfl:     meclizine (ANTIVERT) 25 mg tablet, Take 1 tablet (25 mg total) by mouth every 8 (eight) hours as needed for dizziness or nausea, Disp: 30 tablet, Rfl: 0    metoprolol succinate (TOPROL-XL) 25 mg 24 hr tablet, Take 2 tablets (50 mg total) by mouth daily Dose increased from 25 mg to 50 mg daily (2 tabs), Disp: 60 tablet, Rfl: 3    Multiple Vitamin (multivitamin) tablet, Take 1 tablet by mouth daily, Disp: , Rfl:     pantoprazole (PROTONIX) 40 mg tablet, Take 1 tablet (40 mg total) by mouth daily, Disp: 30 tablet, Rfl: 0  No Known Allergies    Labs:     Chemistry        Component Value Date/Time    K 3.8 05/08/2025 0525    K 4.2 08/07/2021 0714     05/08/2025 0525     (H) 08/07/2021 0714    CO2  21 05/08/2025 0525    CO2 21 05/06/2025 0845    CO2 21 (L) 08/07/2021 0714    BUN 22 05/08/2025 0525    BUN 18 08/07/2021 0714    CREATININE 1.36 (H) 05/08/2025 0525    CREATININE 1.30 08/07/2021 0714        Component Value Date/Time    CALCIUM 8.3 (L) 05/08/2025 0525    CALCIUM 8.8 08/07/2021 0714    ALKPHOS 111 (H) 05/02/2025 1108    ALKPHOS 89 08/07/2021 0714    AST 30 05/02/2025 1108    AST 20 08/07/2021 0714    ALT 55 (H) 05/02/2025 1108    ALT 35 08/07/2021 0714        Lab Results   Component Value Date    HDL 45 04/11/2025    HDL 50 09/14/2024    HDL 54 12/06/2023     Lab Results   Component Value Date    LDLCALC 104 (H) 04/11/2025    LDLCALC 123 (H) 09/14/2024    LDLCALC 116 (H) 12/06/2023     Lab Results   Component Value Date    TRIG 100 04/11/2025    TRIG 133 09/14/2024    TRIG 108 12/06/2023     Imaging: Cardiac catheterization  Result Date: 4/25/2025  Narrative:   S/P PCI with TRUNG x 3 from the Mid LM / pLAD and then the Mid LAD x 1   S/P POBA to the Ostial LCX jailed by the Prior TRUNG   Prox LAD to Mid LAD lesion is 70% stenosed.     Cardiac catheterization  Result Date: 4/15/2025  Narrative:   Severe, Diffuse, 2V-CAD: multifocal LAD disease with a prox 65% aneursymal lesion, mid long 70% lesion, and distal 80% lesion / LCX with mild diffuse disease / multifocal RCA disease with a distal 65% lesion and a mid RPDA 90% lesion     MRI brain wo contrast  Result Date: 4/12/2025  Narrative: MRI BRAIN WITHOUT CONTRAST INDICATION: brain, stroke.   Patient presented to the ED on April 11 with lightheadedness and left lower extremity weakness. Stroke like symptoms. Left upper extremity dysmetria, horizontal nystagmus, difficulty with left eye abduction. COMPARISON:   CT of the brain and CTA head and neck 4/11/2025. TECHNIQUE:  Multiplanar, multisequence imaging of the brain was performed. Imaging performed on 1.5T MRI IMAGE QUALITY:  Diagnostic. FINDINGS: BRAIN PARENCHYMA: Age-related cerebral atrophy. There is no  discrete mass, mass effect or midline shift. There is no intracranial hemorrhage.  There is no evidence of acute infarction and diffusion imaging is unremarkable.  Small scattered hyperintensities on T2/FLAIR imaging are noted in the periventricular and subcortical white matter demonstrating an appearance that is statistically most likely to represent mild microangiopathic change. VENTRICLES:  Ventricles and extra-axial CSF spaces are prominent commensurate with the degree of volume loss.  No hydrocephalus.  No acute intraventricular hemorrhage. SELLA AND PITUITARY GLAND:  Normal. ORBITS: Bilateral lens replacements. PARANASAL SINUSES: Minimal mucosal thickening the right maxillary sinus. VASCULATURE:  Evaluation of the major intracranial vasculature demonstrates appropriate flow voids. CALVARIUM AND SKULL BASE:  Normal. EXTRACRANIAL SOFT TISSUES:  Normal.     Impression: White matter changes suggestive of chronic microangiopathy. No acute intracranial pathology. Resident: MIN MADISON I, the attending radiologist, have reviewed the images and agree with the final report above. Workstation performed: [id number]     Echo follow up/limited w/ contrast if indicated  Result Date: 4/11/2025  Narrative:   Left Ventricle: Left ventricular cavity size is normal. There is mild asymmetric hypertrophy of the basal septal wall. The left ventricular ejection fraction is 65% by visual estimation. Systolic function is normal. Wall motion is normal. Diastolic function is mildly abnormal, consistent with grade I (abnormal) relaxation.   Right Ventricle: Right ventricular cavity size is normal. Systolic function is normal.   Left Atrium: The atrium is mildly dilated.   Aortic Valve: The leaflets are moderately thickened. The leaflets are moderately calcified. There is moderate stenosis with Vmax 3.24 m/s, TESSIE 1.02 cm2, DVI 0.30.   Mitral Valve: There is trace regurgitation.   Tricuspid Valve: There is mild regurgitation.  Pulmonary artery systolic pressures are estimated at 26 mmHg.   Compared to report from February 13, 2025, aortic stenosis appears to be moderate.     XR chest 1 view portable  Result Date: 4/11/2025  Narrative: XR CHEST PORTABLE INDICATION: lightheadedness. COMPARISON: CTA chest abdomen pelvis 4/2/2025 FINDINGS: Clear lungs. No pneumothorax or pleural effusion. Normal cardiomediastinal silhouette. Bones are unremarkable for age. Normal upper abdomen.     Impression: No acute cardiopulmonary disease. Workstation performed: [id number]     CT stroke alert brain  Result Date: 4/11/2025  Narrative: CT BRAIN - STROKE ALERT PROTOCOL INDICATION:   Stroke Alert. COMPARISON:  None. TECHNIQUE:  CT examination of the brain was performed.  In addition to axial images, coronal reformatted images were created and submitted for interpretation. Radiation dose length product (DLP) for this visit:  879 mGy-cm .  This examination, like all CT scans performed in the [facility], was performed utilizing techniques to minimize radiation dose exposure, including the use of iterative reconstruction and automated exposure control. IMAGE QUALITY:  Diagnostic. FINDINGS: PARENCHYMA: Decreased attenuation is noted in periventricular and subcortical white matter demonstrating an appearance that is statistically most likely to represent mild microangiopathic change. No CT signs of acute infarction.  No intracranial mass, mass effect or midline shift.  No acute parenchymal hemorrhage. Moderate vascular calcifications noted VENTRICLES AND EXTRA-AXIAL SPACES:  Ventricles and extra-axial CSF spaces are prominent commensurate with the degree of volume loss.  No hydrocephalus.  No acute extra-axial hemorrhage. VISUALIZED ORBITS: Post bilateral ocular lens replacement. PARANASAL SINUSES: Trace mucosal thickening of the visualized paranasal sinuses. CALVARIUM AND EXTRACRANIAL SOFT TISSUES:   Normal.     Impression: No acute vascular  territorial infarct, hemorrhage, or focal mass effect/midline shift.  Chronic microangiopathic changes. Findings were directly discussed with Dr. Gallo at approximately 4:04 a.m. Workstation performed: [id number]     CTA stroke alert (head/neck)  Result Date: 4/11/2025  Narrative: CTA NECK AND BRAIN WITH CONTRAST INDICATION: Stroke Alert COMPARISON:   None. TECHNIQUE:  Post contrast imaging was performed after administration of iodinated contrast through the neck and brain. Post contrast axial 0.625 mm images timed to opacify the arterial system.  3D rendering was performed on an independent workstation.   MIP reconstructions performed. Coronal and sagittal reconstructions were performed of the non contrast portion of the brain. Radiation dose length product (DLP) for this visit:  867 mGy-cm .  This examination, like all CT scans performed in the [facility], was performed utilizing techniques to minimize radiation dose exposure, including the use of iterative reconstruction and automated exposure control. IV Contrast:  90 mL of iohexol IMAGE QUALITY:   Diagnostic FINDINGS: CTA NECK ARCH AND GREAT VESSELS: Bovine configuration noted. Moderate atherosclerotic changes with no severe stenosis. VERTEBRAL ARTERIES: Mild to moderate stenosis at the bilateral vertebral artery origin secondary to calcified atheromatous plaquing. Patent extracranial segments. RIGHT CAROTID: Calcified and noncalcified atheromatous plaquing at the carotid bifurcation and proximal cervical ICA segment. Approximately 50 to 60%.    No dissection. LEFT CAROTID: Calcified and noncalcified atheromatous plaquing at the carotid bifurcation and proximal cervical ICA segment. Approximately 60 to 70%.   No dissection. NASCET criteria was used to determine the degree of internal carotid artery diameter stenosis. CTA BRAIN: INTERNAL CAROTID ARTERIES: Calcified atheromatous plaquing involving the bilateral cavernous and supraclinoid segments  with mild to moderate narrowing, most severe at the distal right supraclinoid segment. No high-grade stenosis or occlusion. ANTERIOR CEREBRAL ARTERY CIRCULATION:  No stenosis or occlusion. MIDDLE CEREBRAL ARTERY CIRCULATION:  No stenosis or occlusion. DISTAL VERTEBRAL ARTERIES: Short segment high-grade stenosis at the proximal mid right intradural vertebral artery secondary to calcified and noncalcified atheromatous plaquing. Similar atherosclerotic of the proximal left intradural vertebral artery resulting in mild to moderate stenosis. BASILAR ARTERY:  No stenosis or occlusion. POSTERIOR CEREBRAL ARTERIES: No stenosis or occlusion. Fetal-type left PCA noted. A patent left posterior communicating artery is seen. Hypoplastic/absent right posterior communicating artery. VENOUS STRUCTURES: Patent NON VASCULAR ANATOMY BRAIN: Post contrast imaging in the arterial phase is unremarkable.  No delayed imaging of the brain was obtained. BONY STRUCTURES:  No acute osseous abnormality. Multilevel cervical degenerative changes. SOFT TISSUES OF THE NECK: No acute cervical soft tissue abnormality is seen. THORACIC INLET: Mild dependent atelectatic changes. No consolidations in the visualized lung fields. Mild fluid distention seen within the mid thoracic esophagus, nonspecific, possibly on the basis of dysmotility and/or reflux. No significant pulmonary arterial filling defect seen.     Impression: No evidence for acute large vessel occlusive disease. Short segment high-grade stenosis at the proximal mid intradural right vertebral artery secondary to calcified and noncalcified atheromatous plaque. Focal moderate stenosis at the right ICA distal supraclinoid segment. Moderate stenosis at the bilateral ICA origins secondary to calcified and noncalcified atheromatous plaquing, left worse than right (approximately 50 to 60% on the right and 60 to 70% on the left based on NASCET criteria). Findings were directly discussed with   Sabino at 4:04 a.m. Workstation performed: [id number]     CTA chest abdomen pelvis w wo contrast TAVR  Result Date: 4/10/2025  Narrative: CT ANGIOGRAM OF THE CHEST, ABDOMEN AND PELVIS WITH [AND WITHOUT] IV CONTRAST INDICATION:  Preoperative evaluation for TAVR COMPARISON: None. TECHNIQUE:  CT angiogram examination of the chest, abdomen and pelvis was performed according to standard protocol with cardiac gating. Axial, sagittal, and coronal reformatted images were submitted for interpretation. 3D reconstructions were performed an independent workstation, and are supplied for review. This examination, like all CT scans performed in the [facility], was performed utilizing techniques to minimize radiation dose exposure, including the use of iterative reconstruction and automated exposure control. Radiation dose length product (DLP) for this visit: 1167.99 mGy-cm . IV Contrast:  85 mL of iohexol Enteric Contrast: Not given. Image Quality: Excellent. Dataset used for reformattin% VASCULAR FINDINGS: Central pulmonary artery is not abnormally enlarged. Right atrium and right ventricle are normal in size. Left atrial cavity is not abnormally dilated. Left ventricular myocardium is thinned at the cardiac apex. There is calcification associated with papillary muscles. Minimal mitral annular calcification. Coronary artery origins are in typical position. Severe coronary artery calcification. Annulus, LVOT and aorta analysis: Typical trileaflet aortic valve morphology. Optimal CT aortic valve plane angles: 3 cusp view: TANI 8, cranial 4 Cusp overlap view BEJARANO 12, cranial 21 Measurements: Annulus diameter (max x min): 30 x 22 mm. Annulus Area 497 mm2. Annulus Perimeter 81 mm. Annulus to left main coronary ostium: 14 mm. Annulus to right main coronary ostium: 14 mm. Sinus of Valsalva height: 23 mm. Aortic sinus of Valsalva diameter (max x min): 35 x 33 mm. LVOT minimal diameter: 27 mm. Sino-tubular junction  minimal diameter: 26 mm. Ascending thoracic aorta maximal diameter: 38 mm. Valve Calcification: Aortic valve calcium score is 2397. Volume of 931 mm3. Thoracic Aorta: Porcelain aorta = no. Aortic root and ascending thoracic aorta free of significant calcification beyond the sino-tubular junction. Aortic arch is normal in course and caliber but demonstrates moderate volume of atheroma and calcified atherosclerotic plaque. There is fusiform ectasia of the proximal right subclavian artery measuring up to 1.5 cm which is slightly kinked just beyond its origin. No flow-limiting stenosis of great vessels. Descending thoracic aorta is normal in course and caliber with moderate predominantly calcified atherosclerotic plaque noted. Abdominal aorta and pelvic artery measurements: Abdominal aorta: Minimal diameter above aortic bifurcation: Extensive mixed calcified and noncalcified atherosclerotic plaque in the infrarenal abdominal aorta with linear strands of calcific and noncalcified atheroma protruding into the aortic lumen which is narrowed to  as little as 8 mm on image 152 of series 303. Calcification: Moderate Tortuosity: none Right iliofemoral segment: Minimal diameter: 6 mm Calcification: mild Tortuosity: Moderate to severe but without kinking Left iliofemoral segment: Minimal diameter: 6 mm Calcification: mild Tortuosity: Moderate, but there is mild kinking of distal common iliac Additional vascular findings: There is mixed calcified and noncalcified atherosclerotic plaque at the right renal artery origin resulting in severe estimated 90% or greater atherosclerotic stenosis. Similarly, there is severe estimated greater than 70% atherosclerotic stenosis at the origin of the left renal artery. Prominent atherosclerotic plaque in superior mesenteric artery origin resulting in only mild less than 50% atherosclerotic stenosis. ADDITIONAL FINDINGS: Chest: No acute clinically significant findings in the lungs, pleura,  mediastinum or chest wall. Abdomen: There is a giant diverticulum along the anterior and superior margin of the third portion of the duodenum extending into the epigastric region, distended to at least 8 cm in greatest dimension on image 303/137. No calcified gallstones. No pericholecystic inflammatory change.  No biliary dilatation. Bilateral renal scarring. Cortical cyst at the lower pole of the left kidney. Extensive colonic diverticulosis with no evidence for acute diverticulitis no acute findings involving liver, pancreas, spleen, adrenal glands, or kidneys. Pelvis: Prostatomegaly. Trabeculation of the wall of the urinary bladder. No acute findings involving urinary bladder, reproductive structures or pelvic cavity. Abdominopelvic Cavity: No ascites.  No pneumoperitoneum.  No lymphadenopathy. Osseous Structures: No acute fracture or destructive osseous lesion.     Impression: Measurements and analysis to allow for planning of Transcatheter Aortic Valve Repair as above. Extensive atherosclerotic changes with excrescent atheroma, including both calcified and noncalcified components protruding into the lumen of the infrarenal abdominal aorta. Tortuosity of common iliac arteries. Extensive arterial vascular atherosclerotic changes with severe atherosclerotic stenosis at both right and left renal artery origins. Giant duodenal diverticulum. Extensive colonic diverticulosis. No evidence for acute diverticulitis. Workstation performed: [id number]     VAS carotid complete study  Result Date: 4/2/2025  Narrative:  [facility] REPORT [facility]INICAL: Indications: Patient presents for a general health evaluation secondary to future TAVR. Patient is asymptomatic at this time. Operative History: No prior cardiovascular surgeries Risk Factors The patient has history of HTN and CKD. Clinical Right Pressure:  136/ mm Hg, Left Pressure:  144/ mm Hg.  FINDINGS:  Right        Impression  PSV  EDV (cm/s)  Direction of Flow  Dist.  ICA                 37          10                     Mid. ICA                  56          16                     Prox. ICA    1 - 49%      65          14                     Dist CCA                  61          11                     Mid CCA                   68           9                     Prox CCA                  60          12                     Ext Carotid               69           9                     Prox Vert                 26           6  Antegrade          Subclavian                63           8                     Innominate               102          10                      Left         Impression  PSV  EDV (cm/s)  Direction of Flow  Dist. ICA                 45          14                     Mid. ICA                  59          23                     Prox. ICA    1 - 49%      52          17                     Dist CCA                  62          12                     Mid CCA                   64          14                     Prox CCA                  73           6                     Ext Carotid               69           6                     Prox Vert                 35          11  Antegrade          Subclavian               102           6                        CONCLUSION:  Impression RIGHT: There is <50% stenosis noted in the internal carotid artery. Plaque is heterogenous and irregular. Vertebral artery flow is antegrade. There is no significant subclavian artery disease. LEFT: There is <50% stenosis noted in the internal carotid artery. Plaque is heterogenous and irregular. Vertebral artery flow is antegrade. There is no significant subclavian artery disease.  Compared to limited carotid study on 9/20/2010, there is no significant change.  SIGNATURE: Electronically Signed by: ROGERIO SALDANA MD on 2025-04-02 04:35:09 PM      ECG:    Sinus rhythm with frequent supraventricular complexes in bigeminy, V-paced complexes, RBBB, T wave abnormality     Review of Systems  "  Constitutional: Negative for chills, diaphoresis, fever, malaise/fatigue and weight gain.   Cardiovascular:  Negative for chest pain, dyspnea on exertion, irregular heartbeat, leg swelling, near-syncope, orthopnea, palpitations, paroxysmal nocturnal dyspnea and syncope.   Respiratory:  Negative for cough, shortness of breath, sleep disturbances due to breathing, snoring and wheezing.    Skin:  Negative for rash.   Gastrointestinal:  Negative for bloating, abdominal pain and nausea.   Neurological:  Negative for dizziness and light-headedness.       Vitals:    05/12/25 1455   BP: 124/76   Pulse: 80     Vitals:    05/12/25 1455   Weight: 69.6 kg (153 lb 6.4 oz)     Height: 5' 5\" (165.1 cm)   Body mass index is 25.53 kg/m².    Physical Exam  Vitals and nursing note reviewed.   Constitutional:       General: He is not in acute distress.     Appearance: Normal appearance. He is not ill-appearing.   HENT:      Head: Normocephalic and atraumatic.      Nose: Nose normal.      Mouth/Throat:      Mouth: Mucous membranes are moist.   Eyes:      Conjunctiva/sclera: Conjunctivae normal.   Cardiovascular:      Rate and Rhythm: Normal rate and regular rhythm.      Pulses:           Radial pulses are 2+ on the right side and 2+ on the left side.      Heart sounds: S1 normal and S2 normal. No murmur heard.  Pulmonary:      Effort: Pulmonary effort is normal. No respiratory distress.      Breath sounds: Normal breath sounds. No stridor. No wheezing, rhonchi or rales.   Abdominal:      General: There is no distension.   Musculoskeletal:         General: No swelling.      Cervical back: Neck supple.      Right lower leg: No edema.      Left lower leg: No edema.   Skin:     General: Skin is warm.      Capillary Refill: Capillary refill takes less than 2 seconds.   Neurological:      General: No focal deficit present.      Mental Status: He is alert.   Psychiatric:         Thought Content: Thought content normal.          "

## 2025-05-12 NOTE — PATIENT INSTRUCTIONS
Take Clopidogrel and Asprin daily for the next year.     Stop Amlodipine     Taking metoprolol 50 mg daily

## 2025-05-13 ENCOUNTER — HOME CARE VISIT (OUTPATIENT)
Dept: HOME HEALTH SERVICES | Facility: HOME HEALTHCARE | Age: 89
End: 2025-05-13
Payer: COMMERCIAL

## 2025-05-13 VITALS
OXYGEN SATURATION: 98 % | HEART RATE: 70 BPM | TEMPERATURE: 96.7 F | SYSTOLIC BLOOD PRESSURE: 112 MMHG | WEIGHT: 150 LBS | DIASTOLIC BLOOD PRESSURE: 64 MMHG | HEIGHT: 64 IN | BODY MASS INDEX: 25.61 KG/M2 | RESPIRATION RATE: 18 BRPM

## 2025-05-13 VITALS — DIASTOLIC BLOOD PRESSURE: 64 MMHG | SYSTOLIC BLOOD PRESSURE: 110 MMHG

## 2025-05-13 PROCEDURE — G0299 HHS/HOSPICE OF RN EA 15 MIN: HCPCS

## 2025-05-13 PROCEDURE — G0152 HHCP-SERV OF OT,EA 15 MIN: HCPCS

## 2025-05-13 NOTE — CASE COMMUNICATION
Pt is declining PT evaluation at this time.    OT eval completed on 5/13/25.  Pt denies the need for further visits.  States he is able to perform his ADL's without difficulty.  He has good understanding of his precautions and plans to start cardiac rehab next week.  OT eval only.

## 2025-05-14 ENCOUNTER — PATIENT OUTREACH (OUTPATIENT)
Dept: CASE MANAGEMENT | Facility: OTHER | Age: 89
End: 2025-05-14

## 2025-05-15 ENCOUNTER — PATIENT OUTREACH (OUTPATIENT)
Dept: CASE MANAGEMENT | Facility: OTHER | Age: 89
End: 2025-05-15

## 2025-05-15 ENCOUNTER — HOME CARE VISIT (OUTPATIENT)
Dept: HOME HEALTH SERVICES | Facility: HOME HEALTHCARE | Age: 89
End: 2025-05-15
Payer: COMMERCIAL

## 2025-05-15 PROCEDURE — G0155 HHCP-SVS OF CSW,EA 15 MIN: HCPCS

## 2025-05-15 NOTE — PROGRESS NOTES
Spoke with Christian explained I was calling to see how he is diong since coming home from hospital Christian states he is doing good. Feels back to his normal self.  Christian states he has stopped amlodipine as instructed. Blood pressure today was up a bit to 119/69. Informed him that was still a good reading he will discuss with visiting nurse tomorrow. Denies dizziness  He did see cardiology on 5/12/25 and will see PCP on 5/19/25  I offered further outreach once visiting nurses have completed his care he declines at this time  Reminded him about after hours nurse phone line if he had any questions. He verbalized understanding

## 2025-05-16 ENCOUNTER — HOME CARE VISIT (OUTPATIENT)
Dept: HOME HEALTH SERVICES | Facility: HOME HEALTHCARE | Age: 89
End: 2025-05-16
Payer: COMMERCIAL

## 2025-05-16 VITALS
DIASTOLIC BLOOD PRESSURE: 66 MMHG | BODY MASS INDEX: 25.61 KG/M2 | HEART RATE: 71 BPM | TEMPERATURE: 96.4 F | HEIGHT: 64 IN | OXYGEN SATURATION: 99 % | SYSTOLIC BLOOD PRESSURE: 122 MMHG | RESPIRATION RATE: 18 BRPM | WEIGHT: 150 LBS

## 2025-05-16 PROCEDURE — G0299 HHS/HOSPICE OF RN EA 15 MIN: HCPCS

## 2025-05-16 PROCEDURE — G0180 MD CERTIFICATION HHA PATIENT: HCPCS | Performed by: THORACIC SURGERY (CARDIOTHORACIC VASCULAR SURGERY)

## 2025-05-19 ENCOUNTER — OFFICE VISIT (OUTPATIENT)
Dept: INTERNAL MEDICINE CLINIC | Facility: CLINIC | Age: 89
End: 2025-05-19
Payer: COMMERCIAL

## 2025-05-19 ENCOUNTER — HOME CARE VISIT (OUTPATIENT)
Dept: HOME HEALTH SERVICES | Facility: HOME HEALTHCARE | Age: 89
End: 2025-05-19
Payer: COMMERCIAL

## 2025-05-19 VITALS
WEIGHT: 151 LBS | TEMPERATURE: 97.9 F | HEART RATE: 59 BPM | BODY MASS INDEX: 25.16 KG/M2 | HEIGHT: 65 IN | SYSTOLIC BLOOD PRESSURE: 122 MMHG | OXYGEN SATURATION: 98 % | DIASTOLIC BLOOD PRESSURE: 68 MMHG

## 2025-05-19 DIAGNOSIS — Z95.0 PRESENCE OF PERMANENT CARDIAC PACEMAKER: ICD-10-CM

## 2025-05-19 DIAGNOSIS — I35.0 NONRHEUMATIC AORTIC VALVE STENOSIS: Primary | ICD-10-CM

## 2025-05-19 DIAGNOSIS — I25.118 CORONARY ARTERY DISEASE OF NATIVE ARTERY OF NATIVE HEART WITH STABLE ANGINA PECTORIS (HCC): ICD-10-CM

## 2025-05-19 DIAGNOSIS — I10 ESSENTIAL HYPERTENSION: ICD-10-CM

## 2025-05-19 PROCEDURE — 99495 TRANSJ CARE MGMT MOD F2F 14D: CPT | Performed by: INTERNAL MEDICINE

## 2025-05-19 NOTE — PROGRESS NOTES
Transition of Care Visit:  Name: Christian Jones      : 1935      MRN: [id number]  Encounter Provider: Marvin Meadows MD  Encounter Date: 2025   Encounter department: [facility] [place]    Assessment & Plan  Nonrheumatic aortic valve stenosis       Placement  of  a  permanent  pacemaker   after  ablation for  arrhythmias.       Essential hypertension         Coronary artery disease of native artery of native heart with stable angina pectoris (HCC)         Presence of permanent cardiac pacemaker              History of Present Illness     Transitional Care Management Review:   Christian Jones is a 89 y.o. male here for TCM follow up.     During the TCM phone call patient stated:  TCM Call (since 2025)     Date and time call was made  2025  9:16 AM    Hospital care reviewed  Records reviewed    Patient was hospitialized at  [facility]    Date of Admission  25    Date of discharge  25    Diagnosis  s/p TAVR, aortic stenosis    Disposition  Home    Were the patients medications reviewed and updated  Yes    Current Symptoms  None      TCM Call (since 2025)     Post hospital issues  Reduced activity    Scheduled for follow up?  Yes    Did you obtain your prescribed medications  Yes    Do you need help managing your prescriptions or medications  No    Is transportation to your appointment needed  No    I have advised the patient to call PCP with any new or worsening symptoms  Lv Chauhan CMA    Living Arrangements  Spouse or Significiant other    Support System  Spouse; Children    Do you have social support  Yes, as much as I need        HPI  Review of Systems   Constitutional: Negative.    HENT:  Negative for dental problem, drooling, ear discharge and ear pain.    Eyes:  Negative for discharge, redness and itching.   Respiratory:  Negative for apnea, cough and wheezing.    Cardiovascular:  Negative for chest pain and  "palpitations.   Gastrointestinal:  Negative for abdominal pain, blood in stool, diarrhea and vomiting.   Endocrine: Negative for polydipsia, polyphagia and polyuria.   Genitourinary:  Negative for decreased urine volume, dysuria and frequency.   Musculoskeletal:  Negative for arthralgias, myalgias and neck stiffness.   Skin:  Negative for pallor and wound.   Allergic/Immunologic: Negative for environmental allergies and food allergies.   Neurological:  Negative for facial asymmetry, light-headedness, numbness and headaches.   Hematological:  Negative for adenopathy. Does not bruise/bleed easily.   Psychiatric/Behavioral:  Negative for agitation, behavioral problems and confusion.      Objective   /68 (BP Location: Left arm, Patient Position: Sitting, Cuff Size: Standard)   Pulse 59   Temp 97.9 °F (36.6 °C) (Temporal)   Ht 5' 5\" (1.651 m)   Wt 68.5 kg (151 lb)   SpO2 98%   BMI 25.13 kg/m²     Physical Exam  Vitals and nursing note reviewed.   Constitutional:       General: He is not in acute distress.     Appearance: He is well-developed.   HENT:      Head: Normocephalic and atraumatic.     Eyes:      Conjunctiva/sclera: Conjunctivae normal.       Cardiovascular:      Rate and Rhythm: Normal rate and regular rhythm.      Heart sounds: No murmur heard.  Pulmonary:      Effort: Pulmonary effort is normal. No respiratory distress.      Breath sounds: Normal breath sounds.   Abdominal:      Palpations: Abdomen is soft.      Tenderness: There is no abdominal tenderness.     Musculoskeletal:         General: No swelling.      Cervical back: Neck supple.     Skin:     General: Skin is warm and dry.      Capillary Refill: Capillary refill takes less than 2 seconds.     Neurological:      Mental Status: He is alert.     Psychiatric:         Mood and Affect: Mood normal.      Doing  well  post  hospitalization  for  aortic valve  replacement.  Medications have been reviewed by provider in current " encounter    Pacemaker   functioning  well / Continue  same  meds.   Fup 4 weeks.       Marvin Meadows MD.FACP

## 2025-05-19 NOTE — PROGRESS NOTES
CARDIAC REHABILITATION   ASSESSMENT AND INDIVIDUALIZED TREATMENT PLAN  INITIAL           Today's date: 2025   # of Exercise Sessions Completed: 1-Evaluation  Patient name: Christian Jones      : 1935  Age: 89 y.o.       MRN: [id number]  Referring Physician: Jered Caballero MD  Cardiologist: Georgia Hernandez PA-C; ana Jarrett MD (not until 2025)  Provider: [place]  Clinician: Jaz Doty MS, CEP    Treatment is tailored to this patient's individual needs.  The ITP was reviewed with the patient and all questions were answered to their satisfaction.  Additional ITP documentation can be found electronically including daily and monthly exercise summaries, daily session notes with ECG summaries, education notes, daily medication reconciliation, and daily physician supervision.      INITIAL EVALUATION SUMMARY DATE:  25    Patient's subjective report of progress/symptoms: Pt denies any SOB and fatigue since having TAVR and stents done. He reports that he ahs been able to return back to his normal daily life.   Home exercise/ADLs: Pt has resumed all light to moderate ADLs without complaint, following L arm restrictions due to PPM. No formal exercise on his own. Primary caregiver for wife who is handicapped. Does not have to complete yard work (done by community). He is thinking about getting an aid to come help him and his wife around the house.   Clinical Comments: has final visit with visiting nurse tomorrow . Pt was questioning the need for cardiac rehab but was in agreement to try it.     Initial Fitness Assessment: 6MWT:  Resting:    Resting:  BP: 122/70  HR: 65 bpm  Exercise:  BP: 150/76  HR: 111 bpm  Dyspnea: 3/10  Distance walked:  850ft  METs:  2.23  ECG Summary: Paced with freq PVCs, freq couplets and occ triplets  Symptoms: slight GOMEZ        Dx:   Encounter Diagnosis   Name Primary?    S/p TAVR (transcatheter aortic valve replacement), bioprosthetic [Z95.3]  Yes       Description of Diagnosis: Transcatheter aortic valve replacement with a 26 mm Marinelli AIMEE 3 Ultra Resilia bioprosthetic valve via a percutaneous right transfemoral approach.   Date of onset: 25  Other Cardiac History: s/p TRUNG x3  mLM, pLAD, mLAD; PPM - 6 weeks         ASSESSMENT    Medical History:   Past Medical History:   Diagnosis Date    Aortic valve, bicuspid 2024    Arthritis 2000    Basal cell carcinoma     Head    Depression 1990    Ear problems     GERD (gastroesophageal reflux disease) 1990    Heart murmur long standing    Hyperlipemia     Hypertension     Positive fecal occult blood test     Psoriasis     Rectal bleeding     Tinnitus     continuing    Visual impairment 1950    corrected       Family History:  Family History   Problem Relation Age of Onset    Hypertension Father     Kidney disease Father     Thrombosis Mother         cause of death    Heart failure Mother                 Allergies:   Patient has no known allergies.    Current Medications:   Current Medications[1]    Medication compliance: Yes   Comments: Pt reports to be compliant with medications    Physical Limitations: arthritis in fingers and joints    Fall Risk: Moderate   Comments: has cane but does not use; no falls but occ unsteadiness    Cultural needs: none      CAD Risk Factors:  Cholesterol: Yes  HTN: Yes  DM: No  Obesity: Yes   Inactivity: Yes      EXERCISE ASSESSMENT:      Current Functional Status:  Occupation: retired-   Recreation/Physical Activity: primary caregiver for wife who is handicapped  ADL’s:Capable of performing light to moderate ADLs following L arm restriction since PPM  [place]: No limitations  Home exercise: none      SMART Exercise Goals:   improved DASI score by 10%  increased exercise capacity by 40% based on peak METs tolerated in cardiac rehab exercise session  improved 6MWT distance by 10%    Patient Specific EXERCISE GOALS:    "    Start daily walking program  Be able to continue to care for his wife  Increase steadiness on feet    Functional Capacity Screening Tool:  [facility] Activity Status Index:  5.07 METs    NUTRITION ASSESSMENT:    Initial Weight:  150.4 lb  Current Weight:     Height:   Ht Readings from Last 1 Encounters:   05/16/25 5' 4\" (1.626 m)       Rate Your Plate Score: 65/81    Diabetes: N/A  A1c: 6.0%    last measured: 4/11/25    Lipid management: Discussed diet and lipid management and Last lipid profile 4/11/25  Chol 169    HDL 45      Current Dietary Habits:  Pt does not use salt but does not check how much is already in foods    SMART Nutrition Goals:   LDL <100, eat 5 or more servings of fruits and vegetables a day, choose 1% or skim milk, rarely eat frozen desserts or choose fruit or fat-free sweets, and choose healthy desserts and sweets such as key food cake or  fruit    Patient Specific NUTRITION GOALS:     1. Check labels for sodium content    Drug/Alcohol Use:   No      PSYCHOSOCIAL ASSESSMENT:    Date of last Assessment:  5/20/25  Depression screening:  PHQ-9 = 1    Interpretation:  1-4 = Minimal Depression  Anxiety screening:  ALEENA-7 = 3    Interpretation: 0-4  = Not anxious    Pt self-report of depression and anxiety   Pt reports history of depression or anxiety but states \"not anymore\"    Self-reported stress level:  2  Stress Management Tools: exercise    SMART Psychosocial Goals:     PHQ-9 - reduced severity by one level and Physical Fitness in [facility] Score < 3    Patient Specific PSYCHOCOSOCIAL GOALS:    Maintain independence- himself and taking care of his wife (he enjoys it)    Quality of Life Screen:  (Higher score indicates disease impact on QOL)  [facility] COOP score: 15/45     Social Support:   spouse, children, and [employer] family  Community/Social Activities: Mandae[employer]     Psychosocial Assessment as it relates to rehabilitation:   Patient denies issues with his/her family or home life that " may affect their rehabilitation efforts.       OTHER CORE COMPONENT ASSESSMENT:    Tobacco Use:     Pt quit    and has abstained    Anginal Symptoms:  SOB, GOMEZ, and excessive fatigue   NTG use: No prescription    SMART Goals:   consistent, controlled resting BP < 130/80 and medication compliance    Patient Specific CORE COMPONENT GOALS:    Monitor home BP  Maintain full medication compliance      INDIVIDUALIZED TREATMENT PLAN      EXERCISE GOALS and PLAN      Progress toward Exercise goals:   Reviewed Pt goals and determined plan of care    Exercise Plan:    home exercise 30+ mins 2 days opposite CR and Group class: Risk Factors for Heart Disease    The patient was counseled on exercise guidelines to achieve a minimum of 150 mins/wk of moderate intensity (RPE 4-6)   exercise and encouraged to add 1-2 days of exercise on opposite days of cardiac rehab as tolerated.       PHYSICIAN PRESCRIBED EXERCISE:    Current Aerobic Exercise Prescription:      Frequency: 3 days/week   Supplement with home exercise 2+ days/wk as tolerated       Minutes: 20 - 30         METS: 1.8-2.4            HR: 50-85% HRR:     RPE: 4-6         Modalities: NuStep, Recumbent bike, and Room walking     Exercise workloads will be progressed gradually as tolerated, within limits of patient's ability, and according to the patient's   response to the exercise program.      Aerobic Exercise Prescription Plan for Progression   Frequency: 3 days/week of cardiac rehab       Supplement with home exercise 2+ days/wk as tolerated    Minutes: 40       >150 mins/wk of moderate intensity exercise   METS: 2.0-3.0   HR: 50-85% HRR:       RPE: 4-6   Modalities: UBE, NuStep, Recumbent bike, and Room walking    Strength trainin-3 days / week  12-15 repetitions  Will be added following 2-3 weeks of monitored exercise sessions   Modalities: Chest Press, Arm Curl, Sit to Stands, Shoulder Shrugs, and Calf Raises    Home Exercise: none    Exercise  Education: benefit of exercise for CAD risk factors and RPE scale     Readiness to change: Preparation:  (Getting ready to change)       NUTRITION GOALS AND PLAN      Nutritional   Reviewed patient's Rate your Plate. Discussed key elements of heart healthy eating. Reviewed patient goals for dietary modifications and their clinical implications.  Reviewed most recent lipid profile.     Patient's progress toward Nutrition goals:    Reviewed Pt goals and determined plan of care      Nutrition Plan:   group class: Reading Food Labels, group Class: Heart Healthy Eating, increase daily intake of fruits and vegetables, drink/use 1%  low fat or skim  milk, choose desserts such as fruit, key food cake, low-fat or fat-free sweets, and choose low sugar desserts and sweets    Measurable goals were based Rate Your Plate Dietary Self-Assessment. These are the areas in which the patient could score higher on the assessment.  Goals include recommendations for a heart healthy diet based on American Heart Association.    Nutrition Education:   heart healthy eating principles  low sodium diet    Readiness to change: Preparation:  (Getting ready to change)       PSYCHOSOCIAL GOALS AND PLAN    Psychosocial Assessment as it relates to rehabilitation:   Patient denies issues with his/her family or home life that may affect their rehabilitation efforts.     Patient's progress toward Psychosocial goals:    Reviewed Pt goals and determined plan of care    Psychosocial Intervention/plan:   Class: Stress and Your Health and Exercise    Psychosocial Education: benefits of a positive support system and stress management techniques    Information to utilize [employer] was provided as well as contact information for counseling through  Behavioral Health and group psychotherapy groups available.    Readiness to change: Preparation:  (Getting ready to change)       OTHER CORE COMPONENTS GOALS and PLAN      Blood Pressure will be monitored  throughout the program and cardiologist will be notified of elevated trends.    Pt will be encouraged to monitor home BP if advised by cardiologist.    Tobacco Plan:   Pt quit 1985 and has abstained since quitting.      Progress toward Core Component goals:   Reviewed Pt goals and determined plan of care    Other Core Components Intervention:   group class: Understanding Heart Disease, group class: Common Heart Medications, medication compliance, engage in regular exercise, check labels for sodium content, and monitor home BP    Group and Individual Education:  sodium's affect on BP; appropriate BP response to exercise    Readiness to change: Preparation:  (Getting ready to change)          [1]   Current Outpatient Medications   Medication Sig Dispense Refill    acetaminophen (TYLENOL) 650 mg CR tablet Take 1 tablet by mouth every 8 (eight) hours as needed for mild pain For arthritis pain      alfuzosin (UROXATRAL) 10 mg 24 hr tablet TAKE 1 TABLET BY MOUTH DAILY 30 tablet 5    aspirin 81 mg chewable tablet Chew 1 tablet (81 mg total) daily 30 tablet 0    atorvastatin (LIPITOR) 80 mg tablet Take 1 tablet (80 mg total) by mouth daily with dinner 30 tablet 3    clopidogrel (PLAVIX) 75 mg tablet Take 1 tablet (75 mg total) by mouth daily 30 tablet 11    cyanocobalamin (VITAMIN B-12) 2000 MCG tablet Take 1 tablet by mouth daily.      meclizine (ANTIVERT) 25 mg tablet Take 1 tablet (25 mg total) by mouth every 8 (eight) hours as needed for dizziness or nausea 30 tablet 0    metoprolol succinate (TOPROL-XL) 25 mg 24 hr tablet Take 2 tablets (50 mg total) by mouth daily Dose increased from 25 mg to 50 mg daily (2 tabs) 60 tablet 3    Multiple Vitamin (multivitamin) tablet Take 1 tablet by mouth daily      pantoprazole (PROTONIX) 40 mg tablet Take 1 tablet (40 mg total) by mouth daily 30 tablet 0     No current facility-administered medications for this visit.

## 2025-05-20 ENCOUNTER — CLINICAL SUPPORT (OUTPATIENT)
Dept: CARDIAC REHAB | Facility: CLINIC | Age: 89
End: 2025-05-20
Attending: PHYSICIAN ASSISTANT
Payer: COMMERCIAL

## 2025-05-20 DIAGNOSIS — Z95.3 S/P TAVR (TRANSCATHETER AORTIC VALVE REPLACEMENT), BIOPROSTHETIC: Primary | ICD-10-CM

## 2025-05-20 PROCEDURE — 93797 PHYS/QHP OP CAR RHAB WO ECG: CPT

## 2025-05-21 ENCOUNTER — HOME CARE VISIT (OUTPATIENT)
Dept: HOME HEALTH SERVICES | Facility: HOME HEALTHCARE | Age: 89
End: 2025-05-21
Payer: COMMERCIAL

## 2025-05-21 VITALS
WEIGHT: 150 LBS | BODY MASS INDEX: 25.61 KG/M2 | TEMPERATURE: 96.4 F | SYSTOLIC BLOOD PRESSURE: 124 MMHG | HEART RATE: 60 BPM | HEIGHT: 64 IN | DIASTOLIC BLOOD PRESSURE: 62 MMHG | RESPIRATION RATE: 18 BRPM | OXYGEN SATURATION: 100 %

## 2025-05-21 PROCEDURE — G0299 HHS/HOSPICE OF RN EA 15 MIN: HCPCS

## 2025-05-23 ENCOUNTER — CLINICAL SUPPORT (OUTPATIENT)
Dept: CARDIAC REHAB | Facility: CLINIC | Age: 89
End: 2025-05-23
Attending: PHYSICIAN ASSISTANT
Payer: COMMERCIAL

## 2025-05-23 DIAGNOSIS — Z95.3 S/P TAVR (TRANSCATHETER AORTIC VALVE REPLACEMENT), BIOPROSTHETIC: Primary | ICD-10-CM

## 2025-05-23 PROCEDURE — 93798 PHYS/QHP OP CAR RHAB W/ECG: CPT

## 2025-05-28 ENCOUNTER — CLINICAL SUPPORT (OUTPATIENT)
Dept: CARDIAC REHAB | Facility: CLINIC | Age: 89
End: 2025-05-28
Attending: PHYSICIAN ASSISTANT
Payer: COMMERCIAL

## 2025-05-28 ENCOUNTER — IN-CLINIC DEVICE VISIT (OUTPATIENT)
Dept: CARDIOLOGY CLINIC | Facility: CLINIC | Age: 89
End: 2025-05-28
Payer: COMMERCIAL

## 2025-05-28 DIAGNOSIS — Z95.3 S/P TAVR (TRANSCATHETER AORTIC VALVE REPLACEMENT), BIOPROSTHETIC: Primary | ICD-10-CM

## 2025-05-28 DIAGNOSIS — Z95.0 PRESENCE OF CARDIAC PACEMAKER: ICD-10-CM

## 2025-05-28 DIAGNOSIS — I47.29 NSVT (NONSUSTAINED VENTRICULAR TACHYCARDIA) (HCC): Primary | ICD-10-CM

## 2025-05-28 PROCEDURE — 93798 PHYS/QHP OP CAR RHAB W/ECG: CPT

## 2025-05-28 PROCEDURE — 93280 PM DEVICE PROGR EVAL DUAL: CPT | Performed by: INTERNAL MEDICINE

## 2025-05-30 ENCOUNTER — CLINICAL SUPPORT (OUTPATIENT)
Dept: CARDIAC REHAB | Facility: CLINIC | Age: 89
End: 2025-05-30
Attending: PHYSICIAN ASSISTANT
Payer: COMMERCIAL

## 2025-05-30 ENCOUNTER — RESULTS FOLLOW-UP (OUTPATIENT)
Dept: CARDIOLOGY CLINIC | Facility: CLINIC | Age: 89
End: 2025-05-30

## 2025-05-30 DIAGNOSIS — Z95.3 S/P TAVR (TRANSCATHETER AORTIC VALVE REPLACEMENT), BIOPROSTHETIC: Primary | ICD-10-CM

## 2025-05-30 PROCEDURE — 93798 PHYS/QHP OP CAR RHAB W/ECG: CPT

## 2025-05-30 NOTE — PROGRESS NOTES
Results for orders placed or performed in visit on 05/28/25   Cardiac EP device report    Narrative    MDT DC PPM (MVP OFF) - ACTIVE SYSTEM IS MRI CONDITIONAL  DEVICE INTERROGATED IN THE [place] OFFICE.  WOUND CHECK: INCISION CLEAN AND DRY WITH EDGES APPROXIMATED; WOUND CARE AND RESTRICTIONS REVIEWED WITH PATIENT (SEE PIC IN EPIC-MEDIA).  BATTERY VOLTAGE ADEQUATE (10.7 YRS). AP 11.9%   90.3% (>40% AVB/DDD 60 BPM). ALL LEAD PARAMETERS WITHIN NORMAL LIMITS.  21 DEVICE CLASSIFIED VT NS EPISODES,  AVAIL EGM SHOWS NSVT, 14 BEATS @ 167 BPM, 10 @ 163, 6 @ 188, .  PT TAKES ASA, CLOPIDOGREL, METOPROLOL SUCC.  EF 55% (ECHO 5/6/2025).  PVC COUNT 80.8/HR (SINGLES). TIME IN AT/AF <0.1%. NO PROGRAMMING CHANGES MADE TO DEVICE PARAMETERS.  NORMAL DEVICE FUNCTION. PAS

## 2025-06-02 ENCOUNTER — APPOINTMENT (OUTPATIENT)
Dept: CARDIAC REHAB | Facility: CLINIC | Age: 89
End: 2025-06-02
Attending: PHYSICIAN ASSISTANT
Payer: COMMERCIAL

## 2025-06-04 ENCOUNTER — CLINICAL SUPPORT (OUTPATIENT)
Dept: CARDIAC REHAB | Facility: CLINIC | Age: 89
End: 2025-06-04
Attending: PHYSICIAN ASSISTANT
Payer: COMMERCIAL

## 2025-06-04 DIAGNOSIS — Z95.3 S/P TAVR (TRANSCATHETER AORTIC VALVE REPLACEMENT), BIOPROSTHETIC: Primary | ICD-10-CM

## 2025-06-04 PROCEDURE — 93798 PHYS/QHP OP CAR RHAB W/ECG: CPT

## 2025-06-05 ENCOUNTER — TELEPHONE (OUTPATIENT)
Dept: CARDIAC SURGERY | Facility: CLINIC | Age: 89
End: 2025-06-05

## 2025-06-05 ENCOUNTER — APPOINTMENT (OUTPATIENT)
Dept: LAB | Facility: CLINIC | Age: 89
End: 2025-06-05
Payer: COMMERCIAL

## 2025-06-05 DIAGNOSIS — I35.0 NONRHEUMATIC AORTIC VALVE STENOSIS: ICD-10-CM

## 2025-06-05 DIAGNOSIS — Z95.2 S/P TAVR (TRANSCATHETER AORTIC VALVE REPLACEMENT): ICD-10-CM

## 2025-06-05 LAB
ANION GAP SERPL CALCULATED.3IONS-SCNC: 6 MMOL/L (ref 4–13)
BASOPHILS # BLD AUTO: 0.04 THOUSANDS/ÂΜL (ref 0–0.1)
BASOPHILS NFR BLD AUTO: 1 % (ref 0–1)
BUN SERPL-MCNC: 25 MG/DL (ref 5–25)
CALCIUM SERPL-MCNC: 9.1 MG/DL (ref 8.4–10.2)
CHLORIDE SERPL-SCNC: 106 MMOL/L (ref 96–108)
CO2 SERPL-SCNC: 27 MMOL/L (ref 21–32)
CREAT SERPL-MCNC: 1.32 MG/DL (ref 0.6–1.3)
EOSINOPHIL # BLD AUTO: 0.65 THOUSAND/ÂΜL (ref 0–0.61)
EOSINOPHIL NFR BLD AUTO: 9 % (ref 0–6)
ERYTHROCYTE [DISTWIDTH] IN BLOOD BY AUTOMATED COUNT: 15.7 % (ref 11.6–15.1)
GFR SERPL CREATININE-BSD FRML MDRD: 47 ML/MIN/1.73SQ M
GLUCOSE P FAST SERPL-MCNC: 88 MG/DL (ref 65–99)
HCT VFR BLD AUTO: 38.2 % (ref 36.5–49.3)
HGB BLD-MCNC: 12 G/DL (ref 12–17)
IMM GRANULOCYTES # BLD AUTO: 0.02 THOUSAND/UL (ref 0–0.2)
IMM GRANULOCYTES NFR BLD AUTO: 0 % (ref 0–2)
LYMPHOCYTES # BLD AUTO: 2.52 THOUSANDS/ÂΜL (ref 0.6–4.47)
LYMPHOCYTES NFR BLD AUTO: 33 % (ref 14–44)
MCH RBC QN AUTO: 28.9 PG (ref 26.8–34.3)
MCHC RBC AUTO-ENTMCNC: 31.4 G/DL (ref 31.4–37.4)
MCV RBC AUTO: 92 FL (ref 82–98)
MONOCYTES # BLD AUTO: 0.77 THOUSAND/ÂΜL (ref 0.17–1.22)
MONOCYTES NFR BLD AUTO: 10 % (ref 4–12)
NEUTROPHILS # BLD AUTO: 3.66 THOUSANDS/ÂΜL (ref 1.85–7.62)
NEUTS SEG NFR BLD AUTO: 47 % (ref 43–75)
NRBC BLD AUTO-RTO: 0 /100 WBCS
PLATELET # BLD AUTO: 130 THOUSANDS/UL (ref 149–390)
PMV BLD AUTO: 12.1 FL (ref 8.9–12.7)
POTASSIUM SERPL-SCNC: 4.4 MMOL/L (ref 3.5–5.3)
RBC # BLD AUTO: 4.15 MILLION/UL (ref 3.88–5.62)
SODIUM SERPL-SCNC: 139 MMOL/L (ref 135–147)
WBC # BLD AUTO: 7.66 THOUSAND/UL (ref 4.31–10.16)

## 2025-06-05 PROCEDURE — 36415 COLL VENOUS BLD VENIPUNCTURE: CPT

## 2025-06-05 PROCEDURE — 80048 BASIC METABOLIC PNL TOTAL CA: CPT

## 2025-06-05 PROCEDURE — 85025 COMPLETE CBC W/AUTO DIFF WBC: CPT

## 2025-06-05 NOTE — TELEPHONE ENCOUNTER
Called pt in regards to his bloodwork that was ordered to be done prior to his 30 TAVR f/u appointment. Pt states he will go get it done right away. All questions addressed.

## 2025-06-06 ENCOUNTER — HOSPITAL ENCOUNTER (OUTPATIENT)
Dept: NON INVASIVE DIAGNOSTICS | Facility: HOSPITAL | Age: 89
Discharge: HOME | End: 2025-06-06
Attending: NURSE PRACTITIONER
Payer: COMMERCIAL

## 2025-06-06 ENCOUNTER — HOSPITAL ENCOUNTER (OUTPATIENT)
Dept: NON INVASIVE DIAGNOSTICS | Facility: HOSPITAL | Age: 89
Discharge: HOME | End: 2025-06-06
Payer: COMMERCIAL

## 2025-06-06 ENCOUNTER — APPOINTMENT (OUTPATIENT)
Dept: CARDIAC REHAB | Facility: CLINIC | Age: 89
End: 2025-06-06
Attending: PHYSICIAN ASSISTANT
Payer: COMMERCIAL

## 2025-06-06 ENCOUNTER — OFFICE VISIT (OUTPATIENT)
Dept: CARDIAC SURGERY | Facility: CLINIC | Age: 89
End: 2025-06-06
Payer: COMMERCIAL

## 2025-06-06 VITALS
OXYGEN SATURATION: 100 % | BODY MASS INDEX: 25.61 KG/M2 | DIASTOLIC BLOOD PRESSURE: 84 MMHG | SYSTOLIC BLOOD PRESSURE: 151 MMHG | WEIGHT: 150 LBS | TEMPERATURE: 96.5 F | HEART RATE: 80 BPM | HEIGHT: 64 IN

## 2025-06-06 VITALS
DIASTOLIC BLOOD PRESSURE: 62 MMHG | BODY MASS INDEX: 25.61 KG/M2 | HEIGHT: 64 IN | WEIGHT: 150 LBS | SYSTOLIC BLOOD PRESSURE: 124 MMHG | HEART RATE: 70 BPM

## 2025-06-06 DIAGNOSIS — Z95.2 S/P TAVR (TRANSCATHETER AORTIC VALVE REPLACEMENT): ICD-10-CM

## 2025-06-06 DIAGNOSIS — I35.0 NONRHEUMATIC AORTIC VALVE STENOSIS: ICD-10-CM

## 2025-06-06 DIAGNOSIS — Z95.3 S/P TAVR (TRANSCATHETER AORTIC VALVE REPLACEMENT), BIOPROSTHETIC: Primary | ICD-10-CM

## 2025-06-06 LAB
AORTIC ROOT: 2.6 CM
AORTIC VALVE MEAN VELOCITY: 12.3 M/S
ASCENDING AORTA: 3.8 CM
ATRIAL RATE: 77 BPM
AV AREA BY CONTINUOUS VTI: 1.6 CM2
AV AREA PEAK VELOCITY: 1.4 CM2
AV LVOT MEAN GRADIENT: 1 MMHG
AV LVOT PEAK GRADIENT: 3 MMHG
AV MEAN PRESS GRAD SYS DOP V1V2: 5.6 MMHG
AV ORIFICE AREA US: 2.31 CM2
AV PEAK GRADIENT: 12.6 MMHG
AV VELOCITY RATIO: 0.61
AV VMAX SYS DOP: 1.8 M/S
BSA FOR ECHO PROCEDURE: 1.73 M2
DOP CALC AO VTI: 31.6 CM
DOP CALC LVOT AREA: 3.8 CM2
DOP CALC LVOT CARDIAC INDEX: 2.53 L/MIN/M2
DOP CALC LVOT CARDIAC OUTPUT: 4.38 L/MIN
DOP CALC LVOT DIAMETER: 2.2 CM
DOP CALC LVOT PEAK VEL VTI: 19.23 CM
DOP CALC LVOT PEAK VEL: 0.8 M/S
DOP CALC LVOT STROKE INDEX: 34.7 ML/M2
DOP CALC LVOT STROKE VOLUME: 73.06 CM3
E WAVE DECELERATION TIME: 313 MS
E/A RATIO: 0.79
FRACTIONAL SHORTENING: 29 (ref 28–44)
INTERVENTRICULAR SEPTUM IN DIASTOLE (PARASTERNAL SHORT AXIS VIEW): 1.1 CM
INTERVENTRICULAR SEPTUM: 1.1 CM (ref 0.6–1.1)
IVC: 18.3 MM
LAAS-AP2: 21.7 CM2
LAAS-AP4: 12.8 CM2
LEFT ATRIUM SIZE: 3.7 CM
LEFT ATRIUM VOLUME (MOD BIPLANE): 43 ML
LEFT ATRIUM VOLUME INDEX (MOD BIPLANE): 24.9 ML/M2
LEFT INTERNAL DIMENSION IN SYSTOLE: 3 CM (ref 2.1–4)
LEFT VENTRICULAR INTERNAL DIMENSION IN DIASTOLE: 4.2 CM (ref 3.5–6)
LEFT VENTRICULAR POSTERIOR WALL IN END DIASTOLE: 0.9 CM
LEFT VENTRICULAR STROKE VOLUME: 41 ML
LV EF US.2D.A4C+ESTIMATED: 59 %
LVSV (TEICH): 41 ML
MV E'TISSUE VEL-LAT: 6 CM/S
MV E'TISSUE VEL-SEP: 5 CM/S
MV PEAK A VEL: 1.22 M/S
MV PEAK E VEL: 96 CM/S
MV STENOSIS PRESSURE HALF TIME: 91 MS
MV VALVE AREA P 1/2 METHOD: 2.42
P AXIS: 51 DEGREES
PR INTERVAL: 180 MS
QRS AXIS: 143 DEGREES
QRSD INTERVAL: 124 MS
QT INTERVAL: 428 MS
QTC INTERVAL: 485 MS
RA PRESSURE ESTIMATED: 8 MMHG
RIGHT ATRIUM AREA SYSTOLE A4C: 15 CM2
RIGHT VENTRICLE ID DIMENSION: 3.4 CM
RV PSP: 28 MMHG
SL CV LEFT ATRIUM LENGTH A2C: 5.9 CM
SL CV LV EF: 55
SL CV PED ECHO LEFT VENTRICLE DIASTOLIC VOLUME (MOD BIPLANE) 2D: 76 ML
SL CV PED ECHO LEFT VENTRICLE SYSTOLIC VOLUME (MOD BIPLANE) 2D: 36 ML
T WAVE AXIS: 95 DEGREES
TR MAX PG: 20 MMHG
TR PEAK VELOCITY: 2.2 M/S
TRICUSPID ANNULAR PLANE SYSTOLIC EXCURSION: 1.7 CM
TRICUSPID VALVE PEAK REGURGITATION VELOCITY: 2.21 M/S
VENTRICULAR RATE: 77 BPM

## 2025-06-06 PROCEDURE — 93010 ELECTROCARDIOGRAM REPORT: CPT | Performed by: INTERNAL MEDICINE

## 2025-06-06 PROCEDURE — 93306 TTE W/DOPPLER COMPLETE: CPT | Performed by: STUDENT IN AN ORGANIZED HEALTH CARE EDUCATION/TRAINING PROGRAM

## 2025-06-06 PROCEDURE — 99214 OFFICE O/P EST MOD 30 MIN: CPT | Performed by: THORACIC SURGERY (CARDIOTHORACIC VASCULAR SURGERY)

## 2025-06-06 PROCEDURE — 93306 TTE W/DOPPLER COMPLETE: CPT

## 2025-06-06 PROCEDURE — 93005 ELECTROCARDIOGRAM TRACING: CPT | Performed by: THORACIC SURGERY (CARDIOTHORACIC VASCULAR SURGERY)

## 2025-06-06 RX ORDER — AMLODIPINE BESYLATE 10 MG/1
10 TABLET ORAL DAILY
COMMUNITY

## 2025-06-06 NOTE — PROGRESS NOTES
"Procedure: S/P transfemoral transcatheter aortic valve replacement, performed on 5/6/25    History: Christian Jones is a 89 y.o. year old male who presents to our office today for routine follow up care following transcatheter aortic valve replacement. Postoperatively, patient's EKG demonstrated a new LBBB, which was persistent on repeat EKG the following day, and there was also a new 1st degree AVB. Heart rates were also labile, so EP evaluated the patient and he went on to have a PPM placed on postop day #1. He was discharged home on postop day #2. He has followed up with his PCP and cardiology, and had a PPM check. He has been ambulating and recovering well after TAVR. He feels well, denies shortness of breath, chest pain, dizziness, edema. He has been taking his medications as prescribed. He has started cardiac rehab, and has a schedule set up to go on MWF. No issues with groin site - it has healed well.     Vital Signs:   Vitals:    06/06/25 1005 06/06/25 1012   BP: 159/83 151/84   BP Location: Left arm Right arm   Patient Position: Sitting Sitting   Cuff Size: Standard Standard   Pulse: 80    Temp: (!) 96.5 °F (35.8 °C)    TempSrc: Tympanic    SpO2: 100%    Weight: 68 kg (150 lb)    Height: 5' 4\" (1.626 m)        Home Medications:   Prior to Admission medications    Medication Sig Start Date End Date Taking? Authorizing Provider   acetaminophen (TYLENOL) 650 mg CR tablet Take 1 tablet by mouth every 8 (eight) hours as needed for mild pain For arthritis pain    Historical Provider, MD mariosin (UROXATRAL) 10 mg 24 hr tablet TAKE 1 TABLET BY MOUTH DAILY 3/17/25   KAI Vegas   aspirin 81 mg chewable tablet Chew 1 tablet (81 mg total) daily 4/13/25 5/19/25  Karen Orantes DO   atorvastatin (LIPITOR) 80 mg tablet Take 1 tablet (80 mg total) by mouth daily with dinner 5/12/25   Georgia Hernandez PA-C   clopidogrel (PLAVIX) 75 mg tablet Take 1 tablet (75 mg total) by mouth daily 5/12/25   " Georgia Hernandez PA-C   cyanocobalamin (VITAMIN B-12) 2000 MCG tablet Take 1 tablet by mouth in the morning.    Historical Provider, MD   meclizine (ANTIVERT) 25 mg tablet Take 1 tablet (25 mg total) by mouth every 8 (eight) hours as needed for dizziness or nausea 4/12/25   Karen Orantes DO   metoprolol succinate (TOPROL-XL) 25 mg 24 hr tablet Take 2 tablets (50 mg total) by mouth daily Dose increased from 25 mg to 50 mg daily (2 tabs) 5/12/25   Georgia Hernandez PA-C   Multiple Vitamin (multivitamin) tablet Take 1 tablet by mouth in the morning.    Historical Provider, MD       Physical Exam:    HEENT/NECK:  Normocephalic. Atraumatic.  No jugular venous distention.    Cardiac: Regular rate and rhythm  Pulmonary:  Breath sounds clear bilaterally  Abdomen:  Non-tender, Non-distended, and Normal bowel sounds  Incisions: Groin puncture sites clean, dry, and intact without hematoma and Pacemaker incision is clean, dry, and intact.   Extremities: Extremities warm/dry  Neuro: Alert and oriented X 3  Skin: Warm/Dry, without rashes or lesions.    Lab Results:     Results from last 7 days   Lab Units 06/05/25  1058   WBC Thousand/uL 7.66   HEMOGLOBIN g/dL 12.0   HEMATOCRIT % 38.2   PLATELETS Thousands/uL 130*     Results from last 7 days   Lab Units 06/05/25  1058   POTASSIUM mmol/L 4.4   CHLORIDE mmol/L 106   CO2 mmol/L 27   BUN mg/dL 25   CREATININE mg/dL 1.32*   CALCIUM mg/dL 9.1       Imaging Studies:     Transthoracic Echocardiogram: 6/6/2025  Official read pending    Results Review Statement: I personally reviewed the following image studies in PACS and associated radiology reports: Echocardiogram. My interpretation of the radiology images/reports is: agree.    TAVR evaluation Assessment:     [facility]: I    Assessment: Aortic stenosis, Non-Rheumatic. S/P transfemoral transcatheter aortic valve replacement;    Plan:     Christian Zambranosarahjustina continues to recover well following transcatheter aortic valve  replacement.  To date they have made progressive improvements with physical rehabilitation. At this point I have cleared them to begin outpatient cardiac rehabilitation and have encouraged them to to do so.  Christian Jones has also been cleared to resume driving at this point. I have asked that they do so in small, progressive increments.    Christian Jones has already been evaluated by their primary care physician and their cardiologist for ongoing medical care. Plavix will be continued as determined by cardiology following PCI of LM. ASA will be continued lifelong for TAVR prophylaxis. Arrangements will be made for one year surveillance follow up with repeat ECG and echocardiogram.  I have advised them to notify their PCP with any new concerns that may arise in the interim. Christian Jones was comfortable with our recommendations and their questions were answered to their satisfaction.    Finally, Christian Jones was educated on their increased risk for developing a prosthetic valve infection with many common dental procedures. Subsequently, we advised them to inform their dentist that they have an implanted TAVR valve. They are not to schedule routine dental cleaning for six months following surgery and antibiotics will need to be prescribed by their dentist, prior to any dental procedures.     Jennifer Goss PA-C  06/06/25  10:14 AM    * This note was completed in part utilizing Blink Messenger direct voice recognition software.   Grammatical errors, random word insertion, spelling mistakes, and incomplete sentences may be an occasional consequence of the system secondary to software limitations, ambient noise and hardware issues. At the time of dictation, efforts were made to edit, clarify and /or correct errors. Please read the chart carefully and recognize, using context, where substitutions have occurred.  If you have any questions or concerns about the context, text or information contained  within the body of this dictation, please contact myself, the provider, for further clarification.

## 2025-06-09 ENCOUNTER — CLINICAL SUPPORT (OUTPATIENT)
Dept: CARDIAC REHAB | Facility: CLINIC | Age: 89
End: 2025-06-09
Attending: PHYSICIAN ASSISTANT
Payer: COMMERCIAL

## 2025-06-09 DIAGNOSIS — Z95.3 S/P TAVR (TRANSCATHETER AORTIC VALVE REPLACEMENT), BIOPROSTHETIC: Primary | ICD-10-CM

## 2025-06-09 PROCEDURE — 93798 PHYS/QHP OP CAR RHAB W/ECG: CPT

## 2025-06-10 ENCOUNTER — RA CDI HCC (OUTPATIENT)
Dept: OTHER | Facility: HOSPITAL | Age: 89
End: 2025-06-10

## 2025-06-11 ENCOUNTER — CLINICAL SUPPORT (OUTPATIENT)
Dept: CARDIAC REHAB | Facility: CLINIC | Age: 89
End: 2025-06-11
Attending: PHYSICIAN ASSISTANT
Payer: COMMERCIAL

## 2025-06-11 DIAGNOSIS — Z95.3 S/P TAVR (TRANSCATHETER AORTIC VALVE REPLACEMENT), BIOPROSTHETIC: Primary | ICD-10-CM

## 2025-06-11 PROCEDURE — 93798 PHYS/QHP OP CAR RHAB W/ECG: CPT

## 2025-06-13 ENCOUNTER — CLINICAL SUPPORT (OUTPATIENT)
Dept: CARDIAC REHAB | Facility: CLINIC | Age: 89
End: 2025-06-13
Attending: PHYSICIAN ASSISTANT
Payer: COMMERCIAL

## 2025-06-13 DIAGNOSIS — Z95.3 S/P TAVR (TRANSCATHETER AORTIC VALVE REPLACEMENT), BIOPROSTHETIC: Primary | ICD-10-CM

## 2025-06-13 PROCEDURE — 93798 PHYS/QHP OP CAR RHAB W/ECG: CPT

## 2025-06-16 ENCOUNTER — OFFICE VISIT (OUTPATIENT)
Dept: INTERNAL MEDICINE CLINIC | Facility: CLINIC | Age: 89
End: 2025-06-16
Payer: COMMERCIAL

## 2025-06-16 ENCOUNTER — CLINICAL SUPPORT (OUTPATIENT)
Dept: CARDIAC REHAB | Facility: CLINIC | Age: 89
End: 2025-06-16
Attending: PHYSICIAN ASSISTANT
Payer: COMMERCIAL

## 2025-06-16 VITALS
DIASTOLIC BLOOD PRESSURE: 62 MMHG | HEIGHT: 64 IN | HEART RATE: 71 BPM | WEIGHT: 150.2 LBS | OXYGEN SATURATION: 98 % | TEMPERATURE: 99.2 F | BODY MASS INDEX: 25.64 KG/M2 | SYSTOLIC BLOOD PRESSURE: 120 MMHG

## 2025-06-16 DIAGNOSIS — Z12.11 SCREENING FOR COLORECTAL CANCER: Primary | ICD-10-CM

## 2025-06-16 DIAGNOSIS — I10 PRIMARY HYPERTENSION: ICD-10-CM

## 2025-06-16 DIAGNOSIS — Z95.3 S/P TAVR (TRANSCATHETER AORTIC VALVE REPLACEMENT), BIOPROSTHETIC: Primary | ICD-10-CM

## 2025-06-16 DIAGNOSIS — E78.00 PURE HYPERCHOLESTEROLEMIA: ICD-10-CM

## 2025-06-16 DIAGNOSIS — Z12.12 SCREENING FOR COLORECTAL CANCER: Primary | ICD-10-CM

## 2025-06-16 DIAGNOSIS — Z95.2 AORTIC VALVE PROSTHESIS PRESENT: ICD-10-CM

## 2025-06-16 PROCEDURE — 93798 PHYS/QHP OP CAR RHAB W/ECG: CPT

## 2025-06-16 PROCEDURE — 99213 OFFICE O/P EST LOW 20 MIN: CPT | Performed by: INTERNAL MEDICINE

## 2025-06-16 PROCEDURE — G2211 COMPLEX E/M VISIT ADD ON: HCPCS | Performed by: INTERNAL MEDICINE

## 2025-06-16 RX ORDER — PANTOPRAZOLE SODIUM 40 MG/1
40 TABLET, DELAYED RELEASE ORAL DAILY
COMMUNITY

## 2025-06-16 NOTE — PROGRESS NOTES
"Assessment/Plan:    Follow up  of  aortic  valve  replacement      Diagnoses and all orders for this visit:    Screening for colorectal cancer  -     Ambulatory Referral to Gastroenterology; Future    Aortic valve prosthesis present    Pure hypercholesterolemia    Primary hypertension    Other orders  -     pantoprazole (PROTONIX) 40 mg tablet; Take 40 mg by mouth daily          Subjective: No  complaints     Patient ID: Christian Jones is a 89 y.o. male.    HPI    The following portions of the patient's history were reviewed and updated as appropriate: allergies, current medications, past family history, past medical history, past social history, past surgical history, and problem list.    Review of Systems   Constitutional: Negative.    HENT:  Negative for dental problem, drooling, ear discharge and ear pain.    Eyes:  Negative for discharge, redness and itching.   Respiratory:  Negative for apnea, cough and wheezing.    Cardiovascular:  Negative for chest pain and palpitations.   Gastrointestinal:  Negative for abdominal pain, blood in stool, diarrhea and vomiting.   Endocrine: Negative for polydipsia, polyphagia and polyuria.   Genitourinary:  Negative for decreased urine volume, dysuria and frequency.   Musculoskeletal:  Negative for arthralgias, myalgias and neck stiffness.   Skin:  Negative for pallor and wound.   Allergic/Immunologic: Negative for environmental allergies and food allergies.   Neurological:  Negative for facial asymmetry, light-headedness, numbness and headaches.   Hematological:  Negative for adenopathy. Does not bruise/bleed easily.   Psychiatric/Behavioral:  Negative for agitation, behavioral problems and confusion.          Objective:      /62 (BP Location: Left arm, Patient Position: Sitting, Cuff Size: Standard)   Pulse 71   Temp 99.2 °F (37.3 °C) (Temporal)   Ht 5' 4\" (1.626 m)   Wt 68.1 kg (150 lb 3.2 oz)   SpO2 98%   BMI 25.78 kg/m²          Physical " Exam  Constitutional:       Appearance: Normal appearance.   HENT:      Head: Normocephalic.      Nose: Nose normal.      Mouth/Throat:      Mouth: Mucous membranes are moist.     Eyes:      Pupils: Pupils are equal, round, and reactive to light.       Cardiovascular:      Rate and Rhythm: Regular rhythm.      Heart sounds: Normal heart sounds.   Pulmonary:      Breath sounds: Normal breath sounds.   Abdominal:      Palpations: Abdomen is soft.     Musculoskeletal:         General: No swelling.      Cervical back: Neck supple.     Skin:     General: Skin is warm.     Neurological:      General: No focal deficit present.      Mental Status: He is alert and oriented to person, place, and time.     Psychiatric:         Mood and Affect: Mood normal.       Doing  very  well  post  aortic  valve  surgery    Hypertension  well  controlled  on current meds  Hyperlipidemia  controlled  on  statin     Fup 3 months.     Marvin Meadows MD FACP

## 2025-06-16 NOTE — PROGRESS NOTES
CARDIAC REHABILITATION   ASSESSMENT AND INDIVIDUALIZED TREATMENT PLAN  30 DAY REASSESSMENT          Today's date: 2025   # of Exercise Sessions Completed: 9  Patient name: Christian Jones      : 1935  Age: 89 y.o.       MRN: [id number]  Referring Physician: Jered Caballero MD  Cardiologist: Georgia Hernandez PA-C; seeing Rahul Jarrett MD (not until 2025)  Provider: [place]  Clinician: Jaz Doty MS, CEP    Treatment is tailored to this patient's individual needs.  The ITP was reviewed with the patient and all questions were answered to their satisfaction.  Additional ITP documentation can be found electronically including daily and monthly exercise summaries, daily session notes with ECG summaries, education notes, daily medication reconciliation, and daily physician supervision.    REASSESSMENT SUMMARY   DATE:  25    See ITP below for further details including patient goals and plan of care for progression.    Resting BP  102/60 - 136/78,  HR 63 - 73  Exercise /62.  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  will be 6 weeks post PPM, has not been apying close attention to L arm restrictions but has been reminded while in rehab. Will start using L arm Wed .   Patient's subjective report of progress in the past 30 days:  Ed feels good with his exercise and has progressed to tolerated 30-34 mins of exercise. He will start using L arm Wed  and will be introduced to light strength training in the next 30 days as well.   Patient has made or is working on dietary modifications:     Yes :  pt reports mainly watching his sodium intake  Clinical Comments: encouraged walking at home      Dx:   Encounter Diagnosis   Name Primary?    S/p TAVR (transcatheter aortic valve replacement), bioprosthetic [Z95.3] Yes       Description of Diagnosis: Transcatheter aortic valve replacement with a 26 mm Marinelli AIMEE 3 Ultra Resilia bioprosthetic valve via a percutaneous right transfemoral approach.   Date of onset: 25  Other Cardiac History: s/p TRUNG x3  mLM, pLAD, mLAD; PPM - 6 weeks         ASSESSMENT    Medical History:   Past Medical History:   Diagnosis Date    Aortic valve, bicuspid 2024    Arthritis 2000    Basal cell carcinoma     Head    Depression 1990    Ear problems     GERD (gastroesophageal reflux disease) 1990    Heart murmur long standing    Hyperlipemia     Hypertension     Positive fecal occult blood test     Psoriasis     Rectal bleeding     Tinnitus     continuing    Visual impairment 1950    corrected       Family History:  Family History   Problem Relation Name Age of Onset    Hypertension Father Christian Morris     Kidney disease Father Christian Morris     Thrombosis Mother Shannan Love         cause of death    Heart failure Mother Shannan Love                 Allergies:   Patient has no known allergies.    Current Medications:   Current Medications[1]    Medication compliance: Yes   Comments: Pt reports to be compliant with medications    Physical Limitations: arthritis in fingers and joints    Fall Risk: Moderate   Comments: has cane but does not use; no falls but occ unsteadiness    Cultural needs: none      CAD Risk Factors:  Cholesterol: Yes  HTN: Yes  DM: No  Obesity: Yes   Inactivity: Yes      EXERCISE ASSESSMENT:      Current Functional Status:  Occupation: retired-   Recreation/Physical Activity: primary caregiver for wife who is handicapped  ADL’s:Capable of performing light to moderate ADLs following L arm restriction since  "PPM  [place]: No limitations  Home exercise: none      SMART Exercise Goals:   improved DASI score by 10%  increased exercise capacity by 40% based on peak METs tolerated in cardiac rehab exercise session  improved 6MWT distance by 10%    Patient Specific EXERCISE GOALS:       Start daily walking program  Be able to continue to care for his wife  Increase steadiness on feet    Functional Capacity Screening Tool:  [facility] Activity Status Index:  5.07 METs    NUTRITION ASSESSMENT:    Initial Weight:  150.4 lb  Current Weight: 151.6 lb    Height:   Ht Readings from Last 1 Encounters:   06/06/25 5' 4\" (1.626 m)       Rate Your Plate Score: 65/81    Diabetes: N/A  A1c: 6.0%    last measured: 4/11/25    Lipid management: Discussed diet and lipid management and Last lipid profile 4/11/25  Chol 169    HDL 45      Current Dietary Habits:  Pt does not use salt but does not check how much is already in foods    SMART Nutrition Goals:   LDL <100, eat 5 or more servings of fruits and vegetables a day, choose 1% or skim milk, rarely eat frozen desserts or choose fruit or fat-free sweets, and choose healthy desserts and sweets such as key food cake or  fruit    Patient Specific NUTRITION GOALS:     1. Check labels for sodium content    Drug/Alcohol Use:   No      PSYCHOSOCIAL ASSESSMENT:    Date of last Assessment:  5/20/25  Depression screening:  PHQ-9 = 1    Interpretation:  1-4 = Minimal Depression  Anxiety screening:  ALEENA-7 = 3    Interpretation: 0-4  = Not anxious    Pt self-report of depression and anxiety   Pt reports history of depression or anxiety but states \"not anymore\"    Self-reported stress level:  2  Stress Management Tools: exercise    SMART Psychosocial Goals:     PHQ-9 - reduced severity by one level and Physical Fitness in [facility] Score < 3    Patient Specific PSYCHOCOSOCIAL GOALS:    Maintain independence- himself and taking care of his wife (he enjoys it)    Quality of Life Screen:  " (Higher score indicates disease impact on QOL)  [facility] COOP score: 15/45     Social Support:   spouse, children, and [employer] family  Community/Social Activities: [employer]     Psychosocial Assessment as it relates to rehabilitation:   Patient denies issues with his/her family or home life that may affect their rehabilitation efforts.       OTHER CORE COMPONENT ASSESSMENT:    Tobacco Use:     Pt quit 1985   and has abstained    Anginal Symptoms:  SOB, GOMEZ, and excessive fatigue   NTG use: No prescription    SMART Goals:   consistent, controlled resting BP < 130/80 and medication compliance    Patient Specific CORE COMPONENT GOALS:    Monitor home BP  Maintain full medication compliance      INDIVIDUALIZED TREATMENT PLAN      EXERCISE GOALS and PLAN      Progress toward Exercise goals:   Pt is progressing and showing improvement  toward the following goals:  compliant with cardiac rehab 3x/week without complaints; continues to be main caregiver for wife which is strenuous at times.  , Pt has not made progress toward the following goals: no formal exercise on his own.     Exercise Plan:    progress workloads as tolerated to maintain RPE 4-6/10  introduce resistance training in rehab session  patient will add home walking increasing distance as tolerated    The patient was counseled on exercise guidelines to achieve a minimum of 150 mins/wk of moderate intensity (RPE 4-6)   exercise and encouraged to add 1-2 days of exercise on opposite days of cardiac rehab as tolerated.       PHYSICIAN PRESCRIBED EXERCISE:    Current Aerobic Exercise Prescription:      Frequency: 3 days/week   Supplement with home exercise 2+ days/wk as tolerated       Minutes: 30-34         METS: 2.1-3.0           HR: 80-95   RPE: 4-6         Modalities: NuStep, Recumbent bike, and Room walking     Exercise workloads will be progressed gradually as tolerated, within limits of patient's ability, and according to the patient's   response to the exercise  program.      Aerobic Exercise Prescription Plan for Progression   Frequency: 3 days/week of cardiac rehab       Supplement with home exercise 2+ days/wk as tolerated    Minutes: 40       >150 mins/wk of moderate intensity exercise   METS: 2.5-3.5   HR: 50-85% HRR:       RPE: 4-6   Modalities: UBE, NuStep, Recumbent bike, and Room walking    Strength trainin-3 days / week  12-15 repetitions  Will be added following 2-3 weeks of monitored exercise sessions   Modalities: Chest Press, Arm Curl, Sit to Stands, Shoulder Shrugs, and Calf Raises    Home Exercise: none    Exercise Education: benefit of exercise for CAD risk factors, RPE scale, and class: Risk Factors for Heart Disease     Readiness to change: Action:  (Changing behavior)      NUTRITION GOALS AND PLAN      Nutritional   Reviewed patient's Rate your Plate. Discussed key elements of heart healthy eating. Reviewed patient goals for dietary modifications and their clinical implications.  Reviewed most recent lipid profile.     Patient's progress toward Nutrition goals:    Pt is progressing and showing improvement  toward the following goals:  reports following heart healthy diet, watches sodium content.        Nutrition Plan:   group class: Reading Food Labels, group Class: Heart Healthy Eating, increase daily intake of fruits and vegetables, drink/use 1%  low fat or skim  milk, choose desserts such as fruit, key food cake, low-fat or fat-free sweets, and choose low sugar desserts and sweets    Measurable goals were based Rate Your Plate Dietary Self-Assessment. These are the areas in which the patient could score higher on the assessment.  Goals include recommendations for a heart healthy diet based on American Heart Association.    Nutrition Education:   heart healthy eating principles  low sodium diet    Readiness to change: Action:  (Changing behavior)      PSYCHOSOCIAL GOALS AND PLAN    Psychosocial Assessment as it relates to rehabilitation:    Patient denies issues with his/her family or home life that may affect their rehabilitation efforts.     Patient's progress toward Psychosocial goals:    Pt is progressing and showing improvement  toward the following goals:  no concerns of anxiety or depression at this time; enjoys coming to rehab.      Psychosocial Intervention/plan:   Class: Stress and Your Health and Exercise    Psychosocial Education: benefits of a positive support system and stress management techniques    Information to utilize [employer] was provided as well as contact information for counseling through  Behavioral Health and group psychotherapy groups available.    Readiness to change: Action:  (Changing behavior)      OTHER CORE COMPONENTS GOALS and PLAN      Blood Pressure will be monitored throughout the program and cardiologist will be notified of elevated trends.    Pt will be encouraged to monitor home BP if advised by cardiologist.    Tobacco Plan:   Pt quit 1985 and has abstained since quitting.      Progress toward Core Component goals:   Pt is progressing and showing improvement  toward the following goals:  reports 100% medication compliance; normal resting BP with appropriate response to exercise.      Other Core Components Intervention:   monitor home BP  medication compliance  check labels for sodium content  engage in regular exercise for BP control  class: Common Heart Medications    Group and Individual Education:  class: Understanding Heart Disease    Readiness to change: Action:  (Changing behavior)         [1]   Current Outpatient Medications   Medication Sig Dispense Refill    acetaminophen (TYLENOL) 650 mg CR tablet Take 1 tablet by mouth every 8 (eight) hours as needed for mild pain For arthritis pain      alfuzosin (UROXATRAL) 10 mg 24 hr tablet TAKE 1 TABLET BY MOUTH DAILY 30 tablet 5    amLODIPine (NORVASC) 10 mg tablet Take 10 mg by mouth daily      aspirin 81 mg chewable tablet Chew 1 tablet (81 mg total)  daily 30 tablet 0    atorvastatin (LIPITOR) 80 mg tablet Take 1 tablet (80 mg total) by mouth daily with dinner 30 tablet 3    clopidogrel (PLAVIX) 75 mg tablet Take 1 tablet (75 mg total) by mouth daily 30 tablet 11    cyanocobalamin (VITAMIN B-12) 2000 MCG tablet Take 1 tablet by mouth in the morning.      meclizine (ANTIVERT) 25 mg tablet Take 1 tablet (25 mg total) by mouth every 8 (eight) hours as needed for dizziness or nausea (Patient not taking: Reported on 6/6/2025) 30 tablet 0    metoprolol succinate (TOPROL-XL) 25 mg 24 hr tablet Take 2 tablets (50 mg total) by mouth daily Dose increased from 25 mg to 50 mg daily (2 tabs) (Patient taking differently: Take 25 mg by mouth in the morning. Reports taking 25 mg once daily.) 60 tablet 3    Multiple Vitamin (multivitamin) tablet Take 1 tablet by mouth in the morning.       No current facility-administered medications for this visit.

## 2025-06-18 ENCOUNTER — CLINICAL SUPPORT (OUTPATIENT)
Dept: CARDIAC REHAB | Facility: CLINIC | Age: 89
End: 2025-06-18
Attending: PHYSICIAN ASSISTANT
Payer: COMMERCIAL

## 2025-06-18 DIAGNOSIS — Z95.3 S/P TAVR (TRANSCATHETER AORTIC VALVE REPLACEMENT), BIOPROSTHETIC: Primary | ICD-10-CM

## 2025-06-18 PROCEDURE — 93798 PHYS/QHP OP CAR RHAB W/ECG: CPT

## 2025-06-20 ENCOUNTER — CLINICAL SUPPORT (OUTPATIENT)
Dept: CARDIAC REHAB | Facility: CLINIC | Age: 89
End: 2025-06-20
Attending: PHYSICIAN ASSISTANT
Payer: COMMERCIAL

## 2025-06-20 DIAGNOSIS — Z95.3 S/P TAVR (TRANSCATHETER AORTIC VALVE REPLACEMENT), BIOPROSTHETIC: Primary | ICD-10-CM

## 2025-06-20 PROCEDURE — 93798 PHYS/QHP OP CAR RHAB W/ECG: CPT

## 2025-06-23 ENCOUNTER — CLINICAL SUPPORT (OUTPATIENT)
Dept: CARDIAC REHAB | Facility: CLINIC | Age: 89
End: 2025-06-23
Attending: PHYSICIAN ASSISTANT
Payer: COMMERCIAL

## 2025-06-23 DIAGNOSIS — Z95.3 S/P TAVR (TRANSCATHETER AORTIC VALVE REPLACEMENT), BIOPROSTHETIC: Primary | ICD-10-CM

## 2025-06-23 PROCEDURE — 93798 PHYS/QHP OP CAR RHAB W/ECG: CPT

## 2025-06-25 ENCOUNTER — CLINICAL SUPPORT (OUTPATIENT)
Dept: CARDIAC REHAB | Facility: CLINIC | Age: 89
End: 2025-06-25
Attending: PHYSICIAN ASSISTANT
Payer: COMMERCIAL

## 2025-06-25 DIAGNOSIS — Z95.3 S/P TAVR (TRANSCATHETER AORTIC VALVE REPLACEMENT), BIOPROSTHETIC: Primary | ICD-10-CM

## 2025-06-25 PROCEDURE — 93798 PHYS/QHP OP CAR RHAB W/ECG: CPT

## 2025-06-27 ENCOUNTER — CLINICAL SUPPORT (OUTPATIENT)
Dept: CARDIAC REHAB | Facility: CLINIC | Age: 89
End: 2025-06-27
Attending: PHYSICIAN ASSISTANT
Payer: COMMERCIAL

## 2025-06-27 DIAGNOSIS — Z95.3 S/P TAVR (TRANSCATHETER AORTIC VALVE REPLACEMENT), BIOPROSTHETIC: Primary | ICD-10-CM

## 2025-06-27 PROCEDURE — 93798 PHYS/QHP OP CAR RHAB W/ECG: CPT

## 2025-06-30 ENCOUNTER — CLINICAL SUPPORT (OUTPATIENT)
Dept: CARDIAC REHAB | Facility: CLINIC | Age: 89
End: 2025-06-30
Attending: PHYSICIAN ASSISTANT
Payer: COMMERCIAL

## 2025-06-30 DIAGNOSIS — Z95.3 S/P TAVR (TRANSCATHETER AORTIC VALVE REPLACEMENT), BIOPROSTHETIC: Primary | ICD-10-CM

## 2025-06-30 PROCEDURE — 93798 PHYS/QHP OP CAR RHAB W/ECG: CPT

## 2025-07-02 ENCOUNTER — CLINICAL SUPPORT (OUTPATIENT)
Dept: CARDIAC REHAB | Facility: CLINIC | Age: 89
End: 2025-07-02
Attending: PHYSICIAN ASSISTANT
Payer: COMMERCIAL

## 2025-07-02 DIAGNOSIS — Z95.3 S/P TAVR (TRANSCATHETER AORTIC VALVE REPLACEMENT), BIOPROSTHETIC: Primary | ICD-10-CM

## 2025-07-02 PROCEDURE — 93798 PHYS/QHP OP CAR RHAB W/ECG: CPT

## 2025-07-06 ENCOUNTER — HOSPITAL ENCOUNTER (EMERGENCY)
Facility: HOSPITAL | Age: 89
Discharge: HOME | End: 2025-07-07
Attending: EMERGENCY MEDICINE | Admitting: EMERGENCY MEDICINE
Payer: COMMERCIAL

## 2025-07-06 VITALS
TEMPERATURE: 98.1 F | DIASTOLIC BLOOD PRESSURE: 77 MMHG | SYSTOLIC BLOOD PRESSURE: 133 MMHG | HEART RATE: 84 BPM | OXYGEN SATURATION: 97 % | RESPIRATION RATE: 16 BRPM

## 2025-07-06 DIAGNOSIS — R04.0 EPISTAXIS: Primary | ICD-10-CM

## 2025-07-06 LAB
ALBUMIN SERPL BCG-MCNC: 3.9 G/DL (ref 3.5–5)
ALP SERPL-CCNC: 86 U/L (ref 34–104)
ALT SERPL W P-5'-P-CCNC: 20 U/L (ref 7–52)
ANION GAP SERPL CALCULATED.3IONS-SCNC: 6 MMOL/L (ref 4–13)
APTT PPP: 36 SECONDS (ref 23–34)
AST SERPL W P-5'-P-CCNC: 17 U/L (ref 13–39)
BASOPHILS # BLD AUTO: 0.05 THOUSANDS/ÂΜL (ref 0–0.1)
BASOPHILS NFR BLD AUTO: 1 % (ref 0–1)
BILIRUB DIRECT SERPL-MCNC: 0.11 MG/DL (ref 0–0.2)
BILIRUB SERPL-MCNC: 0.52 MG/DL (ref 0.2–1)
BUN SERPL-MCNC: 28 MG/DL (ref 5–25)
CALCIUM SERPL-MCNC: 8.6 MG/DL (ref 8.4–10.2)
CHLORIDE SERPL-SCNC: 109 MMOL/L (ref 96–108)
CO2 SERPL-SCNC: 23 MMOL/L (ref 21–32)
CREAT SERPL-MCNC: 1.47 MG/DL (ref 0.6–1.3)
EOSINOPHIL # BLD AUTO: 0.5 THOUSAND/ÂΜL (ref 0–0.61)
EOSINOPHIL NFR BLD AUTO: 6 % (ref 0–6)
ERYTHROCYTE [DISTWIDTH] IN BLOOD BY AUTOMATED COUNT: 14.8 % (ref 11.6–15.1)
GFR SERPL CREATININE-BSD FRML MDRD: 41 ML/MIN/1.73SQ M
GLUCOSE SERPL-MCNC: 110 MG/DL (ref 65–140)
HCT VFR BLD AUTO: 34.6 % (ref 36.5–49.3)
HGB BLD-MCNC: 11.1 G/DL (ref 12–17)
IMM GRANULOCYTES # BLD AUTO: 0.03 THOUSAND/UL (ref 0–0.2)
IMM GRANULOCYTES NFR BLD AUTO: 0 % (ref 0–2)
INR PPP: 1.01 (ref 0.85–1.19)
LYMPHOCYTES # BLD AUTO: 1.21 THOUSANDS/ÂΜL (ref 0.6–4.47)
LYMPHOCYTES NFR BLD AUTO: 14 % (ref 14–44)
MCH RBC QN AUTO: 29.1 PG (ref 26.8–34.3)
MCHC RBC AUTO-ENTMCNC: 32.1 G/DL (ref 31.4–37.4)
MCV RBC AUTO: 91 FL (ref 82–98)
MONOCYTES # BLD AUTO: 0.9 THOUSAND/ÂΜL (ref 0.17–1.22)
MONOCYTES NFR BLD AUTO: 11 % (ref 4–12)
NEUTROPHILS # BLD AUTO: 5.88 THOUSANDS/ÂΜL (ref 1.85–7.62)
NEUTS SEG NFR BLD AUTO: 68 % (ref 43–75)
NRBC BLD AUTO-RTO: 0 /100 WBCS
PLATELET # BLD AUTO: 131 THOUSANDS/UL (ref 149–390)
PMV BLD AUTO: 11.3 FL (ref 8.9–12.7)
POTASSIUM SERPL-SCNC: 4 MMOL/L (ref 3.5–5.3)
PROT SERPL-MCNC: 6.6 G/DL (ref 6.4–8.4)
PROTHROMBIN TIME: 13.5 SECONDS (ref 12.3–15)
RBC # BLD AUTO: 3.82 MILLION/UL (ref 3.88–5.62)
SODIUM SERPL-SCNC: 138 MMOL/L (ref 135–147)
WBC # BLD AUTO: 8.57 THOUSAND/UL (ref 4.31–10.16)

## 2025-07-06 PROCEDURE — 80076 HEPATIC FUNCTION PANEL: CPT

## 2025-07-06 PROCEDURE — 99284 EMERGENCY DEPT VISIT MOD MDM: CPT

## 2025-07-06 PROCEDURE — 99283 EMERGENCY DEPT VISIT LOW MDM: CPT

## 2025-07-06 PROCEDURE — 80048 BASIC METABOLIC PNL TOTAL CA: CPT

## 2025-07-06 PROCEDURE — 85025 COMPLETE CBC W/AUTO DIFF WBC: CPT

## 2025-07-06 PROCEDURE — 85730 THROMBOPLASTIN TIME PARTIAL: CPT

## 2025-07-06 PROCEDURE — 85610 PROTHROMBIN TIME: CPT

## 2025-07-06 PROCEDURE — 36415 COLL VENOUS BLD VENIPUNCTURE: CPT

## 2025-07-07 ENCOUNTER — CLINICAL SUPPORT (OUTPATIENT)
Dept: CARDIAC REHAB | Facility: CLINIC | Age: 89
End: 2025-07-07
Attending: PHYSICIAN ASSISTANT
Payer: COMMERCIAL

## 2025-07-07 DIAGNOSIS — Z95.3 S/P TAVR (TRANSCATHETER AORTIC VALVE REPLACEMENT), BIOPROSTHETIC: Primary | ICD-10-CM

## 2025-07-07 PROCEDURE — 93798 PHYS/QHP OP CAR RHAB W/ECG: CPT

## 2025-07-07 NOTE — DISCHARGE INSTRUCTIONS
Close follow up with PCP and ENT. Return to ED if symptoms worsen, improve, or develop as listed in follow-up section or discussed.  Symptomatic care discussed.    you can:  ?Use a humidifier (a machine that makes the air less dry) in your bedroom when you sleep.  ?Keep the inside of your nose moist with a nasal saline spray or gel, or petroleum jelly (sample brand name: Vaseline).  ?Do not pick your nose, or at least clip your nails before you do to avoid injury.  Also, if you have had a nosebleed in the last 24 hours, you should avoid:  ?Heavy lifting  ?Bending over  ?Blowing your nose very hard  These things can cause your nose to start bleeding again.

## 2025-07-07 NOTE — ED PROVIDER NOTES
Time reflects when diagnosis was documented in both MDM as applicable and the Disposition within this note       Time User Action Codes Description Comment    7/7/2025  1:03 AM PeaceAngela Add [R04.0] Epistaxis           ED Disposition       ED Disposition   Discharge    Condition   Stable    Date/Time   Mon Jul 7, 2025  1:03 AM    Comment   Christian Robert discharge to home/self care.                   Assessment & Plan       Medical Decision Making  Patient is a 89-year-old male on aspirin and Plavix presenting to the ED for evaluation of likely anterior epistaxis.    On physical examination, no active bleeding.  Dried blood to left nostril.  Site of bleeding appears to be from inner medial wall nostril.  Anterior nosebleed.  Blood noted to posterior oropharynx, likely secondary to patient tilting head back during nosebleed.  No evidence of posterior nosebleed.    Plan: Will order CBC for eval of anemia and leukocytosis, BMP and hepatic function panel for eval of LFTs, kidney function and electrolyte abnormalities.  Coagulation studies.    CBC similar to previous.  BMP similar to previous.  Hepatic function panel unremarkable.  PT-INR WNL.  PTT 36.      Patient's nose bled once while in ED after scab was picked.  Pressure held and bleeding subsided.     Patient is hemodynamically stable without evidence of symptomatic anemia.  Referral placed to ENT.  Bleeding controlled.  Discussed importance of taking aspirin and Plavix as prescribed given recent valve repair.  Patient understands and agrees to take medication as prescribed.  Strict return precautions discussed.  Symptomatic care discussed.  Discussed case with attending who is in agreement with assessment and plan.    Discussed findings from the visit with the patient.  We had a conversation regarding supportive care and indications for return.  Recommended appropriate follow-up.  Patient and/or family understand and agree with plan.    Portions of the  "record may have been created with voice recognition software. Occasional use of the incorrect word or \"sound a like\" substitutions may have occurred due to the inherent limitations of voice recognition software. Read the chart carefully and recognize, using context, where substitutions have occurred.       Amount and/or Complexity of Data Reviewed  Labs: ordered.             Medications - No data to display    ED Risk Strat Scores                    No data recorded                            History of Present Illness       Chief Complaint   Patient presents with    Nose Bleed     Pt reports \"uncontrollable nose bleed started at 9pm tonight, controlled  while EMS on scene. Also had nosebleed this am at 0230 lasting half hour. +thinners\"       Past Medical History[1]   Past Surgical History[2]   Family History[3]   Social History[4]   E-Cigarette/Vaping    E-Cigarette Use Never User       E-Cigarette/Vaping Substances      I have reviewed and agree with the history as documented.     Patient is a 59-year-old male with PMH of HTN, rectal bleeding, basal cell carcinoma, HLD, GERD, bicuspid aortic valve, CKD, PVD, CAD, presenting to the ED for evaluation of nosebleed. Patient states that this morning at 2:30 AM he had a nosebleed that lasted approximately 30 minutes only coming from his left nostril, states he tested his head back and it went away.  States 9 PM tonight nosebleed came back, only from left nostril, lasted nearly 1 hour until EMS applied pressure to nose by holding the nose.  Patient states he was putting his head back at home to try to stop the bleeding, did not try pinching his nose at home.  Patient states that this occurred once last week, lasted 30 minutes, again from the left nostril.  Patient states he does have the AC on at home.  States he had his nose packed at 12 years old, denies any other intranasal procedures.  Patient states he has chronic rhinorrhea, denies any worsening.  Denies any " bleeding disorders, hepatics/renal failure, lightheaded/dizziness, confusion, nausea/vomiting, hemoptysis, chest pain, shortness of breath, headache, cough, congestion, sore throat, rashes, loss of consciousness, abdominal pain, gum bleeding, blood in stool or urine, etc.    Patient is on aspirin and Plavix.  States he started these medications in May 2025 after having heart valve replaced.  Patient states he was concerned about his nosebleed so he stopped taking Plavix last week, started again, and then stopped taking his Plavix again last 3 days.  States he is still taking his aspirin.         CBC, type and screen, coags, BMP or LFTs      Nose Bleed  Associated symptoms: no cough, no fever and no sore throat        Review of Systems   Constitutional:  Negative for chills and fever.   HENT:  Positive for nosebleeds. Negative for ear pain and sore throat.    Eyes:  Negative for pain and visual disturbance.   Respiratory:  Negative for cough and shortness of breath.    Cardiovascular:  Negative for chest pain and palpitations.   Gastrointestinal:  Negative for abdominal pain, blood in stool, constipation, diarrhea, nausea and vomiting.   Genitourinary:  Negative for dysuria and hematuria.   Musculoskeletal:  Negative for arthralgias and back pain.   Skin:  Negative for color change and rash.   Neurological:  Negative for seizures and syncope.   All other systems reviewed and are negative.          Objective       ED Triage Vitals [07/06/25 2218]   Temperature Pulse Blood Pressure Respirations SpO2 Patient Position - Orthostatic VS   98.1 °F (36.7 °C) 89 129/91 17 97 % Lying      Temp Source Heart Rate Source BP Location FiO2 (%) Pain Score    Oral Monitor Right arm -- No Pain      Vitals      Date and Time Temp Pulse SpO2 Resp BP Pain Score FACES Pain Rating User   07/06/25 2230 -- 84 97 % 16 133/77 -- -- TP   07/06/25 2218 98.1 °F (36.7 °C) 89 97 % 17 129/91 No Pain -- KU            Physical Exam  Vitals and nursing  note reviewed.   Constitutional:       General: He is not in acute distress.     Appearance: He is well-developed. He is not ill-appearing, toxic-appearing or diaphoretic.   HENT:      Head: Normocephalic and atraumatic.      Right Ear: External ear normal.      Left Ear: External ear normal.      Nose:      Comments: No active bleeding.  Dried blood to left nostril.  Site of bleeding appears to be from inner medial wall nostril.  Anterior nosebleed.  Blood noted to posterior oropharynx, likely secondary to patient tilting head back during nosebleed.  No evidence of posterior nosebleed.     Mouth/Throat:      Mouth: Mucous membranes are moist.      Pharynx: No oropharyngeal exudate or posterior oropharyngeal erythema.     Eyes:      General: No scleral icterus.        Right eye: No discharge.         Left eye: No discharge.      Extraocular Movements: Extraocular movements intact.      Conjunctiva/sclera: Conjunctivae normal.      Pupils: Pupils are equal, round, and reactive to light.       Cardiovascular:      Rate and Rhythm: Normal rate and regular rhythm.      Pulses: Normal pulses.      Heart sounds: Normal heart sounds. No murmur heard.  Pulmonary:      Effort: Pulmonary effort is normal. No respiratory distress.      Breath sounds: Normal breath sounds. No stridor. No wheezing, rhonchi or rales.   Abdominal:      Palpations: Abdomen is soft.      Tenderness: There is no abdominal tenderness. There is no right CVA tenderness, left CVA tenderness, guarding or rebound.     Musculoskeletal:         General: No swelling.      Cervical back: Normal range of motion and neck supple.     Skin:     General: Skin is warm and dry.      Capillary Refill: Capillary refill takes less than 2 seconds.      Findings: No rash.     Neurological:      General: No focal deficit present.      Mental Status: He is alert and oriented to person, place, and time.     Psychiatric:         Mood and Affect: Mood normal.         Results  Reviewed       Procedure Component Value Units Date/Time    Protime-INR [[id number]]  (Normal) Collected: 07/06/25 2253    Lab Status: Final result Specimen: Blood from Arm, Left Updated: 07/06/25 2314     Protime 13.5 seconds      INR 1.01    Narrative:      INR Therapeutic Range    Indication                                             INR Range      Atrial Fibrillation                                               2.0-3.0  Hypercoagulable State                                    2.0.2.3  Left Ventricular Asist Device                            2.0-3.0  Mechanical Heart Valve                                  -    Aortic(with afib, MI, embolism, HF, LA enlargement,    and/or coagulopathy)                                     2.0-3.0 (2.5-3.5)     Mitral                                                             2.5-3.5  Prosthetic/Bioprosthetic Heart Valve               2.0-3.0  Venous thromboembolism (VTE: VT, PE        2.0-3.0    APTT [[id number]]  (Abnormal) Collected: 07/06/25 2253    Lab Status: Final result Specimen: Blood from Arm, Left Updated: 07/06/25 2314     PTT 36 seconds     Basic metabolic panel [[id number]]  (Abnormal) Collected: 07/06/25 2253    Lab Status: Final result Specimen: Blood from Arm, Left Updated: 07/06/25 2313     Sodium 138 mmol/L      Potassium 4.0 mmol/L      Chloride 109 mmol/L      CO2 23 mmol/L      ANION GAP 6 mmol/L      BUN 28 mg/dL      Creatinine 1.47 mg/dL      Glucose 110 mg/dL      Calcium 8.6 mg/dL      eGFR 41 ml/min/1.73sq m     Narrative:      [employer] guidelines for Chronic Kidney Disease (CKD):     Stage 1 with normal or high GFR (GFR > 90 mL/min/1.73 square meters)    Stage 2 Mild CKD (GFR = 60-89 mL/min/1.73 square meters)    Stage 3A Moderate CKD (GFR = 45-59 mL/min/1.73 square meters)    Stage 3B Moderate CKD (GFR = 30-44 mL/min/1.73 square meters)    Stage 4 Severe CKD (GFR = 15-29 mL/min/1.73 square meters)    Stage 5 End Stage CKD (GFR  <15 mL/min/1.73 square meters)  Note: GFR calculation is accurate only with a steady state creatinine    Hepatic function panel [[id number]]  (Normal) Collected: 07/06/25 2253    Lab Status: Final result Specimen: Blood from Arm, Left Updated: 07/06/25 2313     Total Bilirubin 0.52 mg/dL      Bilirubin, Direct 0.11 mg/dL      Alkaline Phosphatase 86 U/L      AST 17 U/L      ALT 20 U/L      Total Protein 6.6 g/dL      Albumin 3.9 g/dL     CBC and differential [[id number]]  (Abnormal) Collected: 07/06/25 2253    Lab Status: Final result Specimen: Blood from Arm, Left Updated: 07/06/25 2302     WBC 8.57 Thousand/uL      RBC 3.82 Million/uL      Hemoglobin 11.1 g/dL      Hematocrit 34.6 %      MCV 91 fL      MCH 29.1 pg      MCHC 32.1 g/dL      RDW 14.8 %      MPV 11.3 fL      Platelets 131 Thousands/uL      nRBC 0 /100 WBCs      Segmented % 68 %      Immature Grans % 0 %      Lymphocytes % 14 %      Monocytes % 11 %      Eosinophils Relative 6 %      Basophils Relative 1 %      Absolute Neutrophils 5.88 Thousands/µL      Absolute Immature Grans 0.03 Thousand/uL      Absolute Lymphocytes 1.21 Thousands/µL      Absolute Monocytes 0.90 Thousand/µL      Eosinophils Absolute 0.50 Thousand/µL      Basophils Absolute 0.05 Thousands/µL             No orders to display       Procedures    ED Medication and Procedure Management   Prior to Admission Medications   Prescriptions Last Dose Informant Patient Reported? Taking?   Multiple Vitamin (multivitamin) tablet  Self Yes No   Sig: Take 1 tablet by mouth in the morning.   acetaminophen (TYLENOL) 650 mg CR tablet  Self Yes No   Sig: Take 1 tablet by mouth every 8 (eight) hours as needed for mild pain For arthritis pain   alfuzosin (UROXATRAL) 10 mg 24 hr tablet  Self No No   Sig: TAKE 1 TABLET BY MOUTH DAILY   amLODIPine (NORVASC) 10 mg tablet  Self Yes No   Sig: Take 10 mg by mouth daily   aspirin 81 mg chewable tablet  Self No No   Sig: Chew 1 tablet (81 mg total) daily    atorvastatin (LIPITOR) 80 mg tablet  Self No No   Sig: Take 1 tablet (80 mg total) by mouth daily with dinner   clopidogrel (PLAVIX) 75 mg tablet  Self No No   Sig: Take 1 tablet (75 mg total) by mouth daily   cyanocobalamin (VITAMIN B-12) 2000 MCG tablet  Self Yes No   Sig: Take 1 tablet by mouth in the morning.   meclizine (ANTIVERT) 25 mg tablet  Self No No   Sig: Take 1 tablet (25 mg total) by mouth every 8 (eight) hours as needed for dizziness or nausea   Patient not taking: Reported on 6/6/2025   metoprolol succinate (TOPROL-XL) 25 mg 24 hr tablet  Self No No   Sig: Take 2 tablets (50 mg total) by mouth daily Dose increased from 25 mg to 50 mg daily (2 tabs)   Patient taking differently: Take 25 mg by mouth in the morning. Reports taking 25 mg once daily.   pantoprazole (PROTONIX) 40 mg tablet   Yes No   Sig: Take 40 mg by mouth daily      Facility-Administered Medications: None     Discharge Medication List as of 7/7/2025  1:08 AM        CONTINUE these medications which have NOT CHANGED    Details   acetaminophen (TYLENOL) 650 mg CR tablet Take 1 tablet by mouth every 8 (eight) hours as needed for mild pain For arthritis pain, Historical Med      alfuzosin (UROXATRAL) 10 mg 24 hr tablet TAKE 1 TABLET BY MOUTH DAILY, Starting Mon 3/17/2025, Normal      amLODIPine (NORVASC) 10 mg tablet Take 10 mg by mouth daily, Historical Med      aspirin 81 mg chewable tablet Chew 1 tablet (81 mg total) daily, Starting Sun 4/13/2025, Normal      atorvastatin (LIPITOR) 80 mg tablet Take 1 tablet (80 mg total) by mouth daily with dinner, Starting Mon 5/12/2025, Normal      clopidogrel (PLAVIX) 75 mg tablet Take 1 tablet (75 mg total) by mouth daily, Starting Mon 5/12/2025, Normal      cyanocobalamin (VITAMIN B-12) 2000 MCG tablet Take 1 tablet by mouth in the morning., Historical Med      meclizine (ANTIVERT) 25 mg tablet Take 1 tablet (25 mg total) by mouth every 8 (eight) hours as needed for dizziness or nausea, Starting  Sat 4/12/2025, Normal      metoprolol succinate (TOPROL-XL) 25 mg 24 hr tablet Take 2 tablets (50 mg total) by mouth daily Dose increased from 25 mg to 50 mg daily (2 tabs), Starting Mon 5/12/2025, Normal      Multiple Vitamin (multivitamin) tablet Take 1 tablet by mouth in the morning., Historical Med      pantoprazole (PROTONIX) 40 mg tablet Take 40 mg by mouth daily, Historical Med             ED SEPSIS DOCUMENTATION   Time reflects when diagnosis was documented in both MDM as applicable and the Disposition within this note       Time User Action Codes Description Comment    7/7/2025  1:03 AM Angela Hand Add [R04.0] Epistaxis                      [1]   Past Medical History:  Diagnosis Date    Aortic valve, bicuspid 12/1/2024    Arthritis 1/2000    Basal cell carcinoma     Head    Depression 1/1990    Ear problems 1980    GERD (gastroesophageal reflux disease) 1/1990    Heart murmur long standing    Hyperlipemia     Hypertension     Positive fecal occult blood test     Psoriasis     Rectal bleeding     Tinnitus 1980    continuing    Visual impairment 1/1950    corrected   [2]   Past Surgical History:  Procedure Laterality Date    BASAL CELL CARCINOMA EXCISION      CARDIAC CATHETERIZATION N/A 4/15/2025    Procedure: Cardiac Coronary Angiogram;  Surgeon: Marlene Carney DO;  Location: [facility];  Service: Cardiology    CARDIAC CATHETERIZATION N/A 4/25/2025    Procedure: Cardiac PCI;  Surgeon: Marlene Carney DO;  Location: [facility];  Service: Cardiology    CARDIAC CATHETERIZATION N/A 4/25/2025    Procedure: Cardiac PCI Stent;  Surgeon: Marlene Carney DO;  Location: [facility];  Service: Cardiology    CARDIAC CATHETERIZATION N/A 5/6/2025    Procedure: Cardiac tavr;  Surgeon: Jered Mckeon MD;  Location: [facility];  Service: Cardiology    CARDIAC ELECTROPHYSIOLOGY PROCEDURE N/A 5/7/2025    Procedure: Cardiac pacer implant;  Surgeon: José Antonio Land MD;  Location: [facility];  Service: Cardiology    COLONOSCOPY      COLONOSCOPY  2019    HERNIA REPAIR  2012    Inguinal    LIPOMA RESECTION      [place]    [place] REPLACE AORTIC VALVE OPENFEMORAL ARTERY APPROACH N/A 2025    Procedure: REPLACEMENT AORTIC VALVE TRANSCATHETER (TAVR) TRANSFEMORAL W/ 26MM VALVE(ACCESS ON RIGHT) HIEN;  Surgeon: DIMAS Caballero MD;  Location: [facility];  Service: Cardiac Surgery    SIGMOIDOSCOPY     [3]   Family History  Problem Relation Name Age of Onset    Hypertension Father Christian Morris     Kidney disease Father Christian Morris     Thrombosis Mother Shannan Love         cause of death    Heart failure Mother Shannan Love             [4]   Social History  Tobacco Use    Smoking status: Former     Current packs/day: 0.00     Average packs/day: 0.3 packs/day for 15.0 years (3.8 ttl pk-yrs)     Types: Pipe, Cigarettes     Start date:      Quit date: 1985     Years since quittin.1    Smokeless tobacco: Never   Vaping Use    Vaping status: Never Used   Substance Use Topics    Alcohol use: Yes     Alcohol/week: 13.0 standard drinks of alcohol     Types: 7 Glasses of wine, 6 Cans of beer per week     Comment: drink with meals    Drug use: Never        Angela Hand PA-C  [phone]

## 2025-07-08 ENCOUNTER — VBI (OUTPATIENT)
Dept: INTERNAL MEDICINE CLINIC | Facility: CLINIC | Age: 89
End: 2025-07-08

## 2025-07-08 NOTE — TELEPHONE ENCOUNTER
07/08/25 12:47 PM    Patient contacted post ED visit, [facility] department spoke with patient/caregiver and outreach was successful.    Thank you.  Ting Watts MA  [facility]

## 2025-07-09 ENCOUNTER — CLINICAL SUPPORT (OUTPATIENT)
Dept: CARDIAC REHAB | Facility: CLINIC | Age: 89
End: 2025-07-09
Attending: PHYSICIAN ASSISTANT
Payer: COMMERCIAL

## 2025-07-09 DIAGNOSIS — Z95.3 S/P TAVR (TRANSCATHETER AORTIC VALVE REPLACEMENT), BIOPROSTHETIC: Primary | ICD-10-CM

## 2025-07-09 PROCEDURE — 93798 PHYS/QHP OP CAR RHAB W/ECG: CPT

## 2025-07-11 ENCOUNTER — CLINICAL SUPPORT (OUTPATIENT)
Dept: CARDIAC REHAB | Facility: CLINIC | Age: 89
End: 2025-07-11
Attending: PHYSICIAN ASSISTANT
Payer: COMMERCIAL

## 2025-07-11 DIAGNOSIS — Z95.3 S/P TAVR (TRANSCATHETER AORTIC VALVE REPLACEMENT), BIOPROSTHETIC: Primary | ICD-10-CM

## 2025-07-11 PROCEDURE — 93798 PHYS/QHP OP CAR RHAB W/ECG: CPT

## 2025-07-11 NOTE — PROGRESS NOTES
CARDIAC REHABILITATION   ASSESSMENT AND INDIVIDUALIZED TREATMENT PLAN  60 DAY REASSESSMENT          Today's date: 2025   # of Exercise Sessions Completed: 19  Patient name: Christian Jones      : 1935  Age: 89 y.o.       MRN: [id number]  Referring Physician: Jered Caballero MD  Cardiologist: Georgia Hernandez PA-C; seeing Rahul Jarrett MD (not until 2025)  Provider: [place]  Clinician: Jaz Doty MS, CEP    Treatment is tailored to this patient's individual needs.  The ITP was reviewed with the patient and all questions were answered to their satisfaction.  Additional ITP documentation can be found electronically including daily and monthly exercise summaries, daily session notes with ECG summaries, education notes, daily medication reconciliation, and daily physician supervision.    REASSESSMENT SUMMARY   DATE:  25    See ITP below for further details including patient goals and plan of care for progression.    Resting BP  110/68 - 136/74,  HR 67 - 82  Exercise /60.  HR 86 - 107  Exercise session details:  30-40 minutes,  3.0 - 3.6 METs  Telemetry:  Paced, occ PVCs  Symptoms: No cardiac complaints; still has ongoing arthritis pain in knees and hips that worsens with rainy weather  Current functional status:    home exercise: no formal exercise on his own  ADLs: continues to complete light to moderate ADLs; primary caregiver for wife who is handicapped, pushes her in wheelchair and assists with ambulation- this makes him tired daily  Patient's subjective report of progress in the past 30 days:  Progressed to tolerate up to 40 min; introduced to LC bike and began using his L arm s/p PPM  without complaint  Patient has made or is working on dietary modifications:    Yes :  Pt tries to watch his sodium intake but is not interested in much else because he is 89 years old  Clinical Comments: Pt considering hiring a caregiver for home to help with his wife; Also  considering moving into [facility] apartments    Dx:   Encounter Diagnosis   Name Primary?    S/p TAVR (transcatheter aortic valve replacement), bioprosthetic [Z95.3] Yes       Description of Diagnosis: Transcatheter aortic valve replacement with a 26 mm Marinelli AIMEE 3 Ultra Resilia bioprosthetic valve via a percutaneous right transfemoral approach.   Date of onset: 25  Other Cardiac History: s/p TRUNG x3  mLM, pLAD, mLAD; PPM - 6 weeks         ASSESSMENT    Medical History:   Past Medical History:   Diagnosis Date    Aortic valve, bicuspid 2024    Arthritis 2000    Basal cell carcinoma     Head    Depression 1990    Ear problems     GERD (gastroesophageal reflux disease) 1990    Heart murmur long standing    Hyperlipemia     Hypertension 1980    Positive fecal occult blood test     Psoriasis     Rectal bleeding     Tinnitus     continuing    Visual impairment 1950    corrected       Family History:  Family History   Problem Relation Name Age of Onset    Hypertension Father Christian Morris     Kidney disease Father Christian Morris     Thrombosis Mother Shannan Love         cause of death    Heart failure Mother Shannan Love                 Allergies:   Patient has no known allergies.    Current Medications:   Current Medications[1]    Medication compliance: Yes   Comments: Pt reports to be compliant with medications    Physical Limitations: arthritis in fingers and joints    Fall Risk: Moderate   Comments: has cane but does not use; no falls but occ unsteadiness    Cultural needs: none      CAD Risk Factors:  Cholesterol: Yes  HTN: Yes  DM: No  Obesity: Yes   Inactivity: Yes      EXERCISE ASSESSMENT:      Current Functional Status:  Occupation: retired-   Recreation/Physical Activity: primary caregiver for wife who is handicapped  ADL’s:Capable of performing light to moderate ADLs  Rex: No limitations  Home exercise: none      SMART Exercise Goals:  "  improved DASI score by 10%  increased exercise capacity by 40% based on peak METs tolerated in cardiac rehab exercise session  improved 6MWT distance by 10%    Patient Specific EXERCISE GOALS:       Start daily walking program  Be able to continue to care for his wife  Increase steadiness on feet    Functional Capacity Screening Tool:  [facility] Activity Status Index:  5.07 METs    NUTRITION ASSESSMENT:    Initial Weight:  150.4 lb  Current Weight: 150.6 lb    Height:   Ht Readings from Last 1 Encounters:   07/08/25 5' 4\" (1.626 m)       Rate Your Plate Score: 65/81    Diabetes: N/A  A1c: 6.0%    last measured: 4/11/25    Lipid management: Discussed diet and lipid management and Last lipid profile 4/11/25  Chol 169    HDL 45      Current Dietary Habits:  Pt does not use salt but does not check how much is already in foods    SMART Nutrition Goals:   LDL <100, eat 5 or more servings of fruits and vegetables a day, choose 1% or skim milk, rarely eat frozen desserts or choose fruit or fat-free sweets, and choose healthy desserts and sweets such as key food cake or  fruit    Patient Specific NUTRITION GOALS:     1. Check labels for sodium content    Drug/Alcohol Use:   No      PSYCHOSOCIAL ASSESSMENT:    Date of last Assessment:  5/20/25  Depression screening:  PHQ-9 = 1    Interpretation:  1-4 = Minimal Depression  Anxiety screening:  ALEENA-7 = 3    Interpretation: 0-4  = Not anxious    Pt self-report of depression and anxiety   Pt reports history of depression or anxiety but states \"not anymore\"    Self-reported stress level:  2  Stress Management Tools: exercise    SMART Psychosocial Goals:     PHQ-9 - reduced severity by one level and Physical Fitness in [facility] Score < 3    Patient Specific PSYCHOCOSOCIAL GOALS:    Maintain independence- himself and taking care of his wife (he enjoys it)    Quality of Life Screen:  (Higher score indicates disease impact on QOL)  [facility] COOP score: 15/45     Social " Support:   spouse, children, and [employer] family  Community/Social Activities: [employer]     Psychosocial Assessment as it relates to rehabilitation:   Patient denies issues with his/her family or home life that may affect their rehabilitation efforts.       OTHER CORE COMPONENT ASSESSMENT:    Tobacco Use:     Pt quit 1985   and has abstained    Anginal Symptoms:  SOB, GOMEZ, and excessive fatigue   NTG use: No prescription    SMART Goals:   consistent, controlled resting BP < 130/80 and medication compliance    Patient Specific CORE COMPONENT GOALS:    Monitor home BP  Maintain full medication compliance      INDIVIDUALIZED TREATMENT PLAN      EXERCISE GOALS and PLAN      Progress toward Exercise goals:   Pt is progressing and showing improvement  toward the following goals:  compliant with cardiac rehab 3x/week without complaints; introduced to LC bike and has been able to use his L arm s/p PPM 5/7 without complaint; continues to be main caregiver for wife which is strenuous at times.  , Pt has not made progress toward the following goals: no formal exercise on his own.     Exercise Plan:    progress workloads as tolerated to maintain RPE 4-6/10  introduce resistance training in rehab session  patient will add home walking increasing distance as tolerated    The patient was counseled on exercise guidelines to achieve a minimum of 150 mins/wk of moderate intensity (RPE 4-6)   exercise and encouraged to add 1-2 days of exercise on opposite days of cardiac rehab as tolerated.       PHYSICIAN PRESCRIBED EXERCISE:    Current Aerobic Exercise Prescription:      Frequency: 3 days/week   Supplement with home exercise 2+ days/wk as tolerated       Minutes: 30-40         METS: 3.0-3.6           HR:    RPE: 4-6         Modalities: UBE, Lifecycle, NuStep, Recumbent bike, and Room walking     Exercise workloads will be progressed gradually as tolerated, within limits of patient's ability, and according to the patient's    response to the exercise program.      Aerobic Exercise Prescription Plan for Progression   Frequency: 3 days/week of cardiac rehab       Supplement with home exercise 2+ days/wk as tolerated    Minutes: 40       >150 mins/wk of moderate intensity exercise   METS: 3.0-4.0   HR: 50-85% HRR:       RPE: 4-6   Modalities: UBE, Lifecycle, NuStep, Recumbent bike, and Room walking    Strength trainin-3 days / week  12-15 repetitions  Will be added following 2-3 weeks of monitored exercise sessions   Modalities: Chest Press, Arm Curl, Sit to Stands, Shoulder Shrugs, and Calf Raises    Home Exercise: none    Exercise Education: benefit of exercise for CAD risk factors  RPE scale  class: Risk Factors for Heart Disease     Readiness to change: Action:  (Changing behavior)      NUTRITION GOALS AND PLAN      Nutritional   Reviewed patient's Rate your Plate. Discussed key elements of heart healthy eating. Reviewed patient goals for dietary modifications and their clinical implications.  Reviewed most recent lipid profile.     Patient's progress toward Nutrition goals:    Pt is progressing and showing improvement  toward the following goals:  reports following heart healthy diet, watches sodium content; maintaining weight.  , Pt has not made progress toward the following goals: no interested in any other dietary changes due to being 89 years old.       Nutrition Plan:   increase daily intake of fruits and vegetables  drink/use 1%  low fat or skim  milk  choose desserts such as fruit, key food cake, low-fat or fat-free sweets  choose low sugar desserts and sweets    Measurable goals were based Rate Your Plate Dietary Self-Assessment. These are the areas in which the patient could score higher on the assessment.  Goals include recommendations for a heart healthy diet based on American Heart Association.    Nutrition Education:   low sodium diet  class: Heart Healthy Eating  class:  Label Reading    Readiness to  change: Action:  (Changing behavior)      PSYCHOSOCIAL GOALS AND PLAN    Psychosocial Assessment as it relates to rehabilitation:   Patient denies issues with his/her family or home life that may affect their rehabilitation efforts.     Patient's progress toward Psychosocial goals:    Pt is progressing and showing improvement  toward the following goals:  no concerns of anxiety or depression at this time; enjoys coming to rehab.      Psychosocial Intervention/plan:   exercise    Psychosocial Education: benefits of a positive support system  stress management techniques  class:  Stress and Your Health     Information to utilize [employer] was provided as well as contact information for counseling through  [facility] and group psychotherapy groups available.    Readiness to change: Action:  (Changing behavior)      OTHER CORE COMPONENTS GOALS and PLAN      Blood Pressure will be monitored throughout the program and cardiologist will be notified of elevated trends.    Pt will be encouraged to monitor home BP if advised by cardiologist.    Tobacco Plan:   Pt quit 1985 and has abstained since quitting.      Progress toward Core Component goals:   Pt is progressing and showing improvement  toward the following goals:  reports 100% medication compliance; normal resting BP with appropriate response to exercise.      Other Core Components Intervention:   monitor home BP  medication compliance  check labels for sodium content  engage in regular exercise for BP control    Group and Individual Education:  class: Understanding Heart Disease and class:  Common Heart Medications    Readiness to change: Action:  (Changing behavior)         [1]   Current Outpatient Medications   Medication Sig Dispense Refill    acetaminophen (TYLENOL) 650 mg CR tablet Take 1 tablet by mouth every 8 (eight) hours as needed for mild pain For arthritis pain      alfuzosin (UROXATRAL) 10 mg 24 hr tablet TAKE 1 TABLET BY MOUTH DAILY 30 tablet  5    amLODIPine (NORVASC) 10 mg tablet Take 10 mg by mouth daily      aspirin 81 mg chewable tablet Chew 1 tablet (81 mg total) daily 30 tablet 0    atorvastatin (LIPITOR) 80 mg tablet Take 1 tablet (80 mg total) by mouth daily with dinner 30 tablet 3    clopidogrel (PLAVIX) 75 mg tablet Take 1 tablet (75 mg total) by mouth daily 30 tablet 11    cyanocobalamin (VITAMIN B-12) 2000 MCG tablet Take 1 tablet by mouth in the morning.      meclizine (ANTIVERT) 25 mg tablet Take 1 tablet (25 mg total) by mouth every 8 (eight) hours as needed for dizziness or nausea (Patient not taking: Reported on 7/8/2025) 30 tablet 0    metoprolol succinate (TOPROL-XL) 25 mg 24 hr tablet Take 2 tablets (50 mg total) by mouth daily Dose increased from 25 mg to 50 mg daily (2 tabs) (Patient taking differently: Take 25 mg by mouth in the morning. Reports taking 25 mg once daily.) 60 tablet 3    Multiple Vitamin (multivitamin) tablet Take 1 tablet by mouth in the morning.      pantoprazole (PROTONIX) 40 mg tablet Take 40 mg by mouth daily       No current facility-administered medications for this visit.

## 2025-07-14 ENCOUNTER — CLINICAL SUPPORT (OUTPATIENT)
Dept: CARDIAC REHAB | Facility: CLINIC | Age: 89
End: 2025-07-14
Attending: PHYSICIAN ASSISTANT
Payer: COMMERCIAL

## 2025-07-14 DIAGNOSIS — Z95.3 S/P TAVR (TRANSCATHETER AORTIC VALVE REPLACEMENT), BIOPROSTHETIC: Primary | ICD-10-CM

## 2025-07-14 PROCEDURE — 93798 PHYS/QHP OP CAR RHAB W/ECG: CPT

## 2025-07-16 ENCOUNTER — CLINICAL SUPPORT (OUTPATIENT)
Dept: CARDIAC REHAB | Facility: CLINIC | Age: 89
End: 2025-07-16
Attending: PHYSICIAN ASSISTANT
Payer: COMMERCIAL

## 2025-07-16 DIAGNOSIS — Z95.3 S/P TAVR (TRANSCATHETER AORTIC VALVE REPLACEMENT), BIOPROSTHETIC: Primary | ICD-10-CM

## 2025-07-16 PROCEDURE — 93798 PHYS/QHP OP CAR RHAB W/ECG: CPT

## 2025-07-18 ENCOUNTER — CLINICAL SUPPORT (OUTPATIENT)
Dept: CARDIAC REHAB | Facility: CLINIC | Age: 89
End: 2025-07-18
Attending: PHYSICIAN ASSISTANT
Payer: COMMERCIAL

## 2025-07-18 DIAGNOSIS — Z95.3 S/P TAVR (TRANSCATHETER AORTIC VALVE REPLACEMENT), BIOPROSTHETIC: Primary | ICD-10-CM

## 2025-07-18 PROCEDURE — 93798 PHYS/QHP OP CAR RHAB W/ECG: CPT

## 2025-07-21 ENCOUNTER — CLINICAL SUPPORT (OUTPATIENT)
Dept: CARDIAC REHAB | Facility: CLINIC | Age: 89
End: 2025-07-21
Attending: PHYSICIAN ASSISTANT
Payer: COMMERCIAL

## 2025-07-21 DIAGNOSIS — Z95.3 S/P TAVR (TRANSCATHETER AORTIC VALVE REPLACEMENT), BIOPROSTHETIC: Primary | ICD-10-CM

## 2025-07-21 PROCEDURE — 93798 PHYS/QHP OP CAR RHAB W/ECG: CPT

## 2025-07-23 ENCOUNTER — TELEPHONE (OUTPATIENT)
Dept: NEUROLOGY | Facility: CLINIC | Age: 89
End: 2025-07-23

## 2025-07-23 ENCOUNTER — CLINICAL SUPPORT (OUTPATIENT)
Dept: CARDIAC REHAB | Facility: CLINIC | Age: 89
End: 2025-07-23
Attending: PHYSICIAN ASSISTANT
Payer: COMMERCIAL

## 2025-07-23 DIAGNOSIS — Z95.3 S/P TAVR (TRANSCATHETER AORTIC VALVE REPLACEMENT), BIOPROSTHETIC: Primary | ICD-10-CM

## 2025-07-23 PROCEDURE — 93798 PHYS/QHP OP CAR RHAB W/ECG: CPT

## 2025-07-25 ENCOUNTER — APPOINTMENT (OUTPATIENT)
Dept: CARDIAC REHAB | Facility: CLINIC | Age: 89
End: 2025-07-25
Attending: PHYSICIAN ASSISTANT
Payer: COMMERCIAL

## 2025-07-28 ENCOUNTER — APPOINTMENT (OUTPATIENT)
Dept: CARDIAC REHAB | Facility: CLINIC | Age: 89
End: 2025-07-28
Attending: PHYSICIAN ASSISTANT
Payer: COMMERCIAL

## 2025-07-30 ENCOUNTER — CLINICAL SUPPORT (OUTPATIENT)
Dept: CARDIAC REHAB | Facility: CLINIC | Age: 89
End: 2025-07-30
Attending: PHYSICIAN ASSISTANT
Payer: COMMERCIAL

## 2025-07-30 DIAGNOSIS — Z95.3 S/P TAVR (TRANSCATHETER AORTIC VALVE REPLACEMENT), BIOPROSTHETIC: Primary | ICD-10-CM

## 2025-07-30 PROCEDURE — 93798 PHYS/QHP OP CAR RHAB W/ECG: CPT

## 2025-08-01 ENCOUNTER — CLINICAL SUPPORT (OUTPATIENT)
Dept: CARDIAC REHAB | Facility: CLINIC | Age: 89
End: 2025-08-01
Attending: PHYSICIAN ASSISTANT
Payer: COMMERCIAL

## 2025-08-01 DIAGNOSIS — Z95.3 S/P TAVR (TRANSCATHETER AORTIC VALVE REPLACEMENT), BIOPROSTHETIC: Primary | ICD-10-CM

## 2025-08-01 PROCEDURE — 93798 PHYS/QHP OP CAR RHAB W/ECG: CPT

## 2025-08-04 ENCOUNTER — CLINICAL SUPPORT (OUTPATIENT)
Dept: CARDIAC REHAB | Facility: CLINIC | Age: 89
End: 2025-08-04
Attending: PHYSICIAN ASSISTANT
Payer: COMMERCIAL

## 2025-08-04 DIAGNOSIS — Z95.3 S/P TAVR (TRANSCATHETER AORTIC VALVE REPLACEMENT), BIOPROSTHETIC: Primary | ICD-10-CM

## 2025-08-04 PROCEDURE — 93798 PHYS/QHP OP CAR RHAB W/ECG: CPT

## 2025-08-06 ENCOUNTER — CLINICAL SUPPORT (OUTPATIENT)
Dept: CARDIAC REHAB | Facility: CLINIC | Age: 89
End: 2025-08-06
Attending: PHYSICIAN ASSISTANT
Payer: COMMERCIAL

## 2025-08-06 DIAGNOSIS — Z95.3 S/P TAVR (TRANSCATHETER AORTIC VALVE REPLACEMENT), BIOPROSTHETIC: Primary | ICD-10-CM

## 2025-08-06 PROCEDURE — 93798 PHYS/QHP OP CAR RHAB W/ECG: CPT

## 2025-08-08 ENCOUNTER — CLINICAL SUPPORT (OUTPATIENT)
Dept: CARDIAC REHAB | Facility: CLINIC | Age: 89
End: 2025-08-08
Attending: PHYSICIAN ASSISTANT
Payer: COMMERCIAL

## 2025-08-08 ENCOUNTER — APPOINTMENT (OUTPATIENT)
Dept: LAB | Facility: CLINIC | Age: 89
End: 2025-08-08
Payer: COMMERCIAL

## 2025-08-08 DIAGNOSIS — Z95.3 S/P TAVR (TRANSCATHETER AORTIC VALVE REPLACEMENT), BIOPROSTHETIC: Primary | ICD-10-CM

## 2025-08-08 PROCEDURE — 93798 PHYS/QHP OP CAR RHAB W/ECG: CPT

## 2025-08-15 ENCOUNTER — CLINICAL SUPPORT (OUTPATIENT)
Dept: CARDIAC REHAB | Facility: CLINIC | Age: 89
End: 2025-08-15
Attending: PHYSICIAN ASSISTANT
Payer: COMMERCIAL

## 2025-08-18 ENCOUNTER — CLINICAL SUPPORT (OUTPATIENT)
Dept: CARDIAC REHAB | Facility: CLINIC | Age: 89
End: 2025-08-18
Attending: PHYSICIAN ASSISTANT
Payer: COMMERCIAL

## 2025-08-18 ENCOUNTER — OFFICE VISIT (OUTPATIENT)
Dept: CARDIOLOGY CLINIC | Facility: CLINIC | Age: 89
End: 2025-08-18
Payer: COMMERCIAL

## 2025-08-18 VITALS
SYSTOLIC BLOOD PRESSURE: 142 MMHG | OXYGEN SATURATION: 100 % | HEART RATE: 77 BPM | HEIGHT: 64 IN | WEIGHT: 147 LBS | BODY MASS INDEX: 25.1 KG/M2 | DIASTOLIC BLOOD PRESSURE: 72 MMHG

## 2025-08-18 DIAGNOSIS — I10 PRIMARY HYPERTENSION: Primary | ICD-10-CM

## 2025-08-18 DIAGNOSIS — Z95.3 S/P TAVR (TRANSCATHETER AORTIC VALVE REPLACEMENT), BIOPROSTHETIC: Primary | ICD-10-CM

## 2025-08-18 DIAGNOSIS — I47.29 NSVT (NONSUSTAINED VENTRICULAR TACHYCARDIA) (HCC): ICD-10-CM

## 2025-08-18 DIAGNOSIS — I25.10 CORONARY ARTERY DISEASE INVOLVING NATIVE CORONARY ARTERY OF NATIVE HEART WITHOUT ANGINA PECTORIS: ICD-10-CM

## 2025-08-18 PROCEDURE — G2211 COMPLEX E/M VISIT ADD ON: HCPCS

## 2025-08-18 PROCEDURE — 99214 OFFICE O/P EST MOD 30 MIN: CPT

## 2025-08-18 PROCEDURE — 93798 PHYS/QHP OP CAR RHAB W/ECG: CPT

## 2025-08-18 RX ORDER — ATORVASTATIN CALCIUM 80 MG/1
80 TABLET, FILM COATED ORAL
Qty: 30 TABLET | Refills: 3 | Status: SHIPPED | OUTPATIENT
Start: 2025-08-18

## 2025-08-18 RX ORDER — METOPROLOL SUCCINATE 25 MG/1
50 TABLET, EXTENDED RELEASE ORAL DAILY
Qty: 60 TABLET | Refills: 3 | Status: SHIPPED | OUTPATIENT
Start: 2025-08-18

## 2025-08-18 RX ORDER — AMLODIPINE BESYLATE 10 MG/1
10 TABLET ORAL DAILY
Qty: 90 TABLET | Refills: 3 | Status: SHIPPED | OUTPATIENT
Start: 2025-08-18

## 2025-08-20 ENCOUNTER — CLINICAL SUPPORT (OUTPATIENT)
Dept: CARDIAC REHAB | Facility: CLINIC | Age: 89
End: 2025-08-20
Attending: PHYSICIAN ASSISTANT
Payer: COMMERCIAL

## 2025-08-20 DIAGNOSIS — Z95.3 S/P TAVR (TRANSCATHETER AORTIC VALVE REPLACEMENT), BIOPROSTHETIC: Primary | ICD-10-CM

## 2025-08-20 PROCEDURE — 93798 PHYS/QHP OP CAR RHAB W/ECG: CPT

## 2025-08-22 ENCOUNTER — CLINICAL SUPPORT (OUTPATIENT)
Dept: CARDIAC REHAB | Facility: CLINIC | Age: 89
End: 2025-08-22
Attending: PHYSICIAN ASSISTANT
Payer: COMMERCIAL

## 2025-08-22 DIAGNOSIS — Z95.3 S/P TAVR (TRANSCATHETER AORTIC VALVE REPLACEMENT), BIOPROSTHETIC: Primary | ICD-10-CM

## 2025-08-22 PROCEDURE — 93798 PHYS/QHP OP CAR RHAB W/ECG: CPT

## (undated) DEVICE — 3000CC GUARDIAN II: Brand: GUARDIAN

## (undated) DEVICE — AMPLATZ EXTRA STIFF WIRE GUIDE: Brand: AMPLATZ

## (undated) DEVICE — CATH BAL PTCA EMERGE MR 2 X 15MM

## (undated) DEVICE — INTENDED FOR TISSUE SEPARATION, AND OTHER PROCEDURES THAT REQUIRE A SHARP SURGICAL BLADE TO PUNCTURE OR CUT.: Brand: BARD-PARKER ® CARBON RIB-BACK BLADES

## (undated) DEVICE — GLOVE INDICATOR PI UNDERGLOVE SZ 8 BLUE

## (undated) DEVICE — CATH GUIDE LAUNCHER 6FR EBU 3.5

## (undated) DEVICE — CORONARY IMAGING CATHETERS: Brand: OPTICROSS™ 6 HD

## (undated) DEVICE — DGW .035 FC STR 260CM TEF: Brand: EMERALD

## (undated) DEVICE — SWAN-GANZ BIPOLAR PACING CATHETER: Brand: SWAN-GANZ

## (undated) DEVICE — CATH BAL SAPPHIRE II PRO 1 X 10MM

## (undated) DEVICE — GLIDESHEATH SLENDER STAINLESS STEEL KIT: Brand: GLIDESHEATH SLENDER

## (undated) DEVICE — REM POLYHESIVE ADULT PATIENT RETURN ELECTRODE: Brand: VALLEYLAB

## (undated) DEVICE — CATH BAL PTCA EMERGE MR 2.5 X 20MM

## (undated) DEVICE — Device: Brand: GUIDELINER® V3 CATHETER

## (undated) DEVICE — Device

## (undated) DEVICE — NC TREK NEO™ CORONARY DILATATION CATHETER 3.50 MM X 15 MM / RAPID-EXCHANGE: Brand: NC TREK NEO™

## (undated) DEVICE — DGW .035 FC J3MM 260CM TEF: Brand: EMERALD

## (undated) DEVICE — THERMOFLECT BLANKET, L, 25EA                               TS THERMOFLECT BLANKET, 48" X 84", SILVER, 5/BG, 5 BG/CS NW: Brand: THERMOFLECT

## (undated) DEVICE — CATH DIAG 5FR .045 100CM FR4

## (undated) DEVICE — GUIDEWIRE BMW HYDROCOAT 190CM

## (undated) DEVICE — GUIDELINER CATHETERS ARE INTENDED TO BE USED IN CONJUNCTION WITH GUIDE CATHETERS TO ACCESS DISCRETE REGIONS OF THE CORONARY AND/OR PERIPHERAL VASCULATURE, AND TO FACILITATE PLACEMENT OF INTERVENTIONAL DEVICES.: Brand: GUIDELINER® V3 CATHETER

## (undated) DEVICE — TRAY FOLEY 16FR SURESTEP TEMP SENS URIMETER STAT LOK

## (undated) DEVICE — GUIDEWIRE WHOLEY HI TORQUE INTERM MOD J .035 145CM

## (undated) DEVICE — BETHLEHEM MAJOR GENERAL PACK: Brand: CARDINAL HEALTH

## (undated) DEVICE — CATH F5 INF PIG145 110CM 6SH: Brand: INFINITI

## (undated) DEVICE — CATH BAL PTCA EMERGE MR 3 X 15MM

## (undated) DEVICE — AVITENE NON-WOVEN WEB, 70 MM X 35 MM X 1 MM: Brand: AVITENE

## (undated) DEVICE — CARDIOVASCULAR SPLIT DRAPE: Brand: CONVERTORS

## (undated) DEVICE — TR BAND RADIAL ARTERY COMPRESSION DEVICE: Brand: TR BAND

## (undated) DEVICE — GUIDEWIRE LUNDERQUIST TSCMG-35-300-LESDC

## (undated) DEVICE — GLIDESHEATH BASIC HYDROPHILIC COATED INTRODUCER SHEATH: Brand: GLIDESHEATH

## (undated) DEVICE — RUNTHROUGH NS EXTRA FLOPPY PTCA GUIDEWIRE: Brand: RUNTHROUGH

## (undated) DEVICE — DGW .035 FC J3MM 150CM TEF: Brand: EMERALD

## (undated) DEVICE — EXOFIN PRECISION PEN HIGH VISCOSITY TOPICAL SKIN ADHESIVE: Brand: EXOFIN PRECISION PEN, 1G

## (undated) DEVICE — MICROPUNCTURE INTRODUCER SET SILHOUETTE TRANSITIONLESS PUSH-PLUS DESIGN - STIFFENED CANNULA WITH NITINOL WIRE GUIDE: Brand: MICROPUNCTURE

## (undated) DEVICE — RADIFOCUS OPTITORQUE ANGIOGRAPHIC CATHETER: Brand: OPTITORQUE

## (undated) DEVICE — PINNACLE INTRODUCER SHEATH: Brand: PINNACLE

## (undated) DEVICE — CATH GUIDING FIXED SHAPE 43CM

## (undated) DEVICE — SPONGE 4 X 4 XRAY 16 PLY STRL LF RFD

## (undated) DEVICE — SLITTER ADJUSTABLE

## (undated) DEVICE — BALLOON NC EMERGE 3.5 X 15MM

## (undated) DEVICE — GLOVE SRG BIOGEL ECLIPSE 8

## (undated) DEVICE — INTRO SHEATH PEEL AWAY 7FR

## (undated) DEVICE — HEAVY DUTY TABLE COVER: Brand: CONVERTORS

## (undated) DEVICE — PTCA DILATATION CATHETER: Brand: EMERGE™

## (undated) DEVICE — 3M™ TEGADERM™ CHG DRESSING 25/CARTON 4 CARTONS/CASE 1659: Brand: TEGADERM™

## (undated) DEVICE — BALLOON EUPHORA RX 2.5 X 15MM